# Patient Record
Sex: MALE | Race: ASIAN | Employment: UNEMPLOYED | URBAN - METROPOLITAN AREA
[De-identification: names, ages, dates, MRNs, and addresses within clinical notes are randomized per-mention and may not be internally consistent; named-entity substitution may affect disease eponyms.]

---

## 2018-12-09 ENCOUNTER — HOSPITAL ENCOUNTER (EMERGENCY)
Age: 58
Discharge: HOME | End: 2018-12-09
Attending: EMERGENCY MEDICINE
Payer: COMMERCIAL

## 2018-12-09 VITALS
DIASTOLIC BLOOD PRESSURE: 108 MMHG | HEIGHT: 63 IN | TEMPERATURE: 98.1 F | HEART RATE: 83 BPM | RESPIRATION RATE: 18 BRPM | SYSTOLIC BLOOD PRESSURE: 162 MMHG | WEIGHT: 171.96 LBS | OXYGEN SATURATION: 98 % | BODY MASS INDEX: 30.47 KG/M2

## 2018-12-09 DIAGNOSIS — N30.00 ACUTE CYSTITIS WITHOUT HEMATURIA: ICD-10-CM

## 2018-12-09 DIAGNOSIS — I86.1 RIGHT VARICOCELE: Primary | ICD-10-CM

## 2018-12-09 DIAGNOSIS — N50.89 SWELLING OF RIGHT TESTICLE: ICD-10-CM

## 2018-12-09 DIAGNOSIS — N50.89 TESTICULAR MICROLITHIASIS: ICD-10-CM

## 2018-12-09 DIAGNOSIS — I10 ESSENTIAL HYPERTENSION: ICD-10-CM

## 2018-12-09 DIAGNOSIS — N50.819 TESTICLE PAIN: ICD-10-CM

## 2018-12-09 LAB
AMPHETAMINE SCREEN, URINE: NORMAL
BACTERIA: ABNORMAL /HPF
BARBITURATE SCREEN URINE: NORMAL
BENZODIAZEPINE SCREEN, URINE: NORMAL
BILIRUBIN URINE: NEGATIVE
BLOOD, URINE: ABNORMAL
CANNABINOID SCREEN URINE: NORMAL
CLARITY: CLEAR
COCAINE METABOLITE SCREEN URINE: NORMAL
COLOR: YELLOW
EPITHELIAL CELLS, UA: 0 /HPF (ref 0–5)
GLUCOSE URINE: 100 MG/DL
HYALINE CASTS: 1 /LPF (ref 0–8)
KETONES, URINE: NEGATIVE MG/DL
LEUKOCYTE ESTERASE, URINE: ABNORMAL
Lab: NORMAL
METHADONE SCREEN, URINE: NORMAL
MICROSCOPIC EXAMINATION: YES
NITRITE, URINE: NEGATIVE
OPIATE SCREEN URINE: NORMAL
OXYCODONE URINE: NORMAL
PH UA: 6.5
PH UA: 6.5
PHENCYCLIDINE SCREEN URINE: NORMAL
PROPOXYPHENE SCREEN: NORMAL
PROTEIN UA: 100 MG/DL
RBC UA: 2 /HPF (ref 0–4)
SPECIFIC GRAVITY UA: 1.02
URINE REFLEX TO CULTURE: YES
URINE TYPE: ABNORMAL
UROBILINOGEN, URINE: 0.2 E.U./DL
WBC UA: 22 /HPF (ref 0–5)

## 2018-12-09 PROCEDURE — 87186 SC STD MICRODIL/AGAR DIL: CPT

## 2018-12-09 PROCEDURE — 99283 EMERGENCY DEPT VISIT LOW MDM: CPT

## 2018-12-09 PROCEDURE — 87086 URINE CULTURE/COLONY COUNT: CPT

## 2018-12-09 PROCEDURE — 80307 DRUG TEST PRSMV CHEM ANLYZR: CPT

## 2018-12-09 PROCEDURE — 6370000000 HC RX 637 (ALT 250 FOR IP): Performed by: EMERGENCY MEDICINE

## 2018-12-09 PROCEDURE — 87077 CULTURE AEROBIC IDENTIFY: CPT

## 2018-12-09 PROCEDURE — 6370000000 HC RX 637 (ALT 250 FOR IP)

## 2018-12-09 PROCEDURE — 81001 URINALYSIS AUTO W/SCOPE: CPT

## 2018-12-09 RX ORDER — CIPROFLOXACIN 500 MG/1
500 TABLET, FILM COATED ORAL ONCE
Status: COMPLETED | OUTPATIENT
Start: 2018-12-09 | End: 2018-12-09

## 2018-12-09 RX ORDER — TRAMADOL HYDROCHLORIDE 50 MG/1
50 TABLET ORAL EVERY 6 HOURS PRN
Qty: 12 TABLET | Refills: 0 | Status: SHIPPED | OUTPATIENT
Start: 2018-12-09 | End: 2018-12-12

## 2018-12-09 RX ORDER — HYDROCODONE BITARTRATE AND ACETAMINOPHEN 5; 325 MG/1; MG/1
TABLET ORAL
Status: COMPLETED
Start: 2018-12-09 | End: 2018-12-09

## 2018-12-09 RX ORDER — IBUPROFEN 600 MG/1
600 TABLET ORAL EVERY 6 HOURS PRN
Qty: 30 TABLET | Refills: 0 | Status: SHIPPED | OUTPATIENT
Start: 2018-12-09 | End: 2021-03-24

## 2018-12-09 RX ORDER — CIPROFLOXACIN 500 MG/1
500 TABLET, FILM COATED ORAL 2 TIMES DAILY
Qty: 14 TABLET | Refills: 0 | Status: SHIPPED | OUTPATIENT
Start: 2018-12-09 | End: 2018-12-16

## 2018-12-09 RX ADMIN — HYDROCODONE BITARTRATE AND ACETAMINOPHEN: 5; 325 TABLET ORAL at 02:45

## 2018-12-09 RX ADMIN — CIPROFLOXACIN 500 MG: 500 TABLET, FILM COATED ORAL at 06:15

## 2018-12-09 ASSESSMENT — PAIN SCALES - GENERAL: PAINLEVEL_OUTOF10: 9

## 2018-12-10 ENCOUNTER — HOSPITAL ENCOUNTER (OUTPATIENT)
Dept: ULTRASOUND IMAGING | Age: 58
Discharge: HOME | End: 2018-12-10
Payer: COMMERCIAL

## 2018-12-10 PROCEDURE — 76870 US EXAM SCROTUM: CPT

## 2018-12-11 LAB
ORGANISM: ABNORMAL
URINE CULTURE, ROUTINE: ABNORMAL
URINE CULTURE, ROUTINE: ABNORMAL

## 2020-02-21 ENCOUNTER — OFFICE VISIT (OUTPATIENT)
Dept: ORTHOPEDIC SURGERY | Age: 60
End: 2020-02-21
Payer: COMMERCIAL

## 2020-02-21 VITALS — HEIGHT: 64 IN | BODY MASS INDEX: 28.17 KG/M2 | WEIGHT: 165 LBS

## 2020-02-21 PROBLEM — M25.512 CHRONIC LEFT SHOULDER PAIN: Status: ACTIVE | Noted: 2020-02-21

## 2020-02-21 PROBLEM — E11.9 TYPE 2 DIABETES MELLITUS WITHOUT COMPLICATION, WITHOUT LONG-TERM CURRENT USE OF INSULIN (HCC): Status: ACTIVE | Noted: 2020-02-21

## 2020-02-21 PROBLEM — M65.331 TRIGGER FINGER, RIGHT MIDDLE FINGER: Status: ACTIVE | Noted: 2020-02-21

## 2020-02-21 PROBLEM — G89.29 CHRONIC LEFT SHOULDER PAIN: Status: ACTIVE | Noted: 2020-02-21

## 2020-02-21 PROCEDURE — 99202 OFFICE O/P NEW SF 15 MIN: CPT | Performed by: ORTHOPAEDIC SURGERY

## 2020-02-21 PROCEDURE — 20610 DRAIN/INJ JOINT/BURSA W/O US: CPT | Performed by: ORTHOPAEDIC SURGERY

## 2020-02-21 RX ORDER — LIDOCAINE HYDROCHLORIDE 10 MG/ML
5 INJECTION, SOLUTION INFILTRATION; PERINEURAL ONCE
Status: COMPLETED | OUTPATIENT
Start: 2020-02-21 | End: 2020-02-21

## 2020-02-21 RX ORDER — BETAMETHASONE SODIUM PHOSPHATE AND BETAMETHASONE ACETATE 3; 3 MG/ML; MG/ML
12 INJECTION, SUSPENSION INTRA-ARTICULAR; INTRALESIONAL; INTRAMUSCULAR; SOFT TISSUE ONCE
Status: COMPLETED | OUTPATIENT
Start: 2020-02-21 | End: 2020-02-21

## 2020-02-21 RX ADMIN — BETAMETHASONE SODIUM PHOSPHATE AND BETAMETHASONE ACETATE 12 MG: 3; 3 INJECTION, SUSPENSION INTRA-ARTICULAR; INTRALESIONAL; INTRAMUSCULAR; SOFT TISSUE at 10:42

## 2020-02-21 RX ADMIN — LIDOCAINE HYDROCHLORIDE 5 ML: 10 INJECTION, SOLUTION INFILTRATION; PERINEURAL at 10:43

## 2020-02-21 NOTE — PATIENT INSTRUCTIONS
1. ??????? ???? ??????????? ??????? ??????????? ????? ? ?????????? ???? ???? ???? ???????? ????? ???? ????? ??????????  2. ??????? ????????? ????? ?????? ???? ?????????? ? ??????? ??????? ???????? ??????? ????? ???? ???????????  3. 8 ???? 12 ????????????? ???? ????? ??????????  4. 2 ???? 4 ??????? ?????????????????    ?????? ????? (??????)   1. ??????? ?????? ??????? ??????? ????? ???? 30 ???????? ????? ????????? ??? ???????? ????? ??????????  2. ??????? ???? ????? ??????? ???????? ??????? ??????? ??? ?????? ????? ????????? ??????????? ??????? ????? ??? ??????? ????? ????? ??????? ???? ?????? ?????? ???????????  3. ???? ?????? ??????? 15 ???? 20 ?????????? ????? ??????????  4. ??????? ??????? ???? ?????? ???????? ???????????  5. ??????? 2 ???? 4 ??????? ????????????????? ???? ??? ???? ?????? ?????? ??????????    ?????? ????? (?????????)   1. ?????????? ??????????, ? ??????? ?????? ????? ???????? ???? ??? ??????????  2. ????? ??????? ?? ?????, ??????? ??? ?????? ????? ?????????? ??????? ?????? ??????? ???? ????????????? (????? ??????? ???? ????? ??????? ??????????????)  3. ????? ????????? ????? ???????? 15 ???? 30 ??????????? ???????????  4. ???????? ????? ????????? ?? ????? ???? ????????? ?????????????  5. ??????? 2 ???? 4 ??????? ????????????????? ???? ??? ???? ?????? ?????? ??????????    ?????? ???? ??????   1. ????? ?????? ??????? ?????? ????????? ? ?????? ??????? ??????? ??????????? ??????? ?????? ?????? ??????? ????????????  2. 6 ??????? ???????????  3. 8 ???? 12 ??? ?????????????????    ?????????? ???????: ?????? ??????   1. ??????? ???? ???? ??????????? ?? ??????? ???? ?? ??? ?? ??? ??????? ?????? ????? ???? ????? (?????? ????, ?????? ????)?  2. ?? ????????, ???? ?? ????????? ???? ??????????? ????? ????????? ???? ??????????? ??? ??? ??????? ?????? ??????? ????? ?????????  3. ????? ????????? ???? ????? ???? ????? ??????? ???? ???????????  4. 8 ???? 12 ??? ?????????????????    ?????????? ?????? ?????? ????????? ???? ?????? ???????????  5. 8 ???? 12 ??? ?????????????????  6. ??????? ???????? ????? ???????? ????????? ?? ?????????? ???? ?????, ??????? ????? ??????? ????????? ?????? ???? ??????????? ??????? ??????? ?????? ????????, ??????? ????? ?????? ? ???????? ????? ???????? ?????? ???? ???????????    ?????????? ???????: ??????????   1. ??? ??????? ?????? ??????? ???? ???? ??????? ??????????? (?????????? ????????? ????? ???????) ???????? ????? ????????? ?????? ??????? ?????  2. ?????????? ????? ?????? ??????? ????????? ????? ?????? 90 ?????? ???? ?????????????? ??????? ?????? ???????? ?? ????????? ?????????? ??????? ??????? ????????? ??? ????? ?????? ???????????  3. 8 ???? 12 ??? ?????????????????  4. ??????? ?????? ????? ?????? ????? ? ???, ?????? ??????? ??????? ????????? ?? ????????? ?????? ?????????? ??????? ????????? 90 ?????? ????? ??????????? ???????? ??????? ?????? ???? ???? ??????????? ??????? ?????? ???????? ??????? ???? ??????? ???? ??????? ????? ??????????? ??????? ??????? ????????? ??? ????? ?????? ???????????    ??????? ?????? ??????? ???????   1. ???? ???????? ????????? ????????? ????????? ?????? ???????? ?????? ???? ?????????? ??????? ??????? ???????? ?? ?????? ??????? ?????? ???? ???????????  2. ??????? ??????? ??????? ????? ? ??????? ????????? 90 ???????? ???????? ????????? ?? ?????????? ??????? ???????? ?????? ??????? ???? ?????? ???????? ????? ??????? ??????????? ????? ???? ??????? ?????? ? ??????? ?????? ????? ??????? ????? ????? ????????????? ???? ??????? ?????? ??????? ?????? ??????? ???? ????? ??????  3. ?????????? ???????? ????? ???????? ????????? ???? ??????? ????????????  4. ??????? ????????? ??????? ?????? ?????????? ??????? ???????????? ??????? ??????? ??????? ??????? ???? ????????? ???? ?????? ???????????  5. ??????? ?????? ? ???????? ?????? ?????????? ??????? ??????? ????? ?? ??????? ???? ?????? ???????????  6. 8 ???? 12 ??? ?????????????????    ?????? ?????? ??????? ??????? seconds, then relax. 4. Repeat 8 to 12 times. Straight-arm shoulder blade squeeze   1. Sit or stand tall. Relax your shoulders. 2. With palms down, hold your elastic tubing or band straight out in front of you. 3. Start with slight tension in the tubing or band, with your hands about shoulder-width apart. 4. Slowly pull straight out to the sides, squeezing your shoulder blades together. Keep your arms straight and at shoulder height. Slowly release. 5. Repeat 8 to 12 times. Rowing   1. Harrison your elastic tubing or band at about waist height. Take one end in each hand. 2. Sit or stand with your feet hip-width apart. 3. Hold your arms straight in front of you. Adjust your distance to create slight tension in the tubing or band. 4. Slightly tuck your chin. Relax your shoulders. 5. Without shrugging your shoulders, pull straight back. Your elbows will pass alongside your waist.    Pull-downs   1. Harrison your elastic tubing or band in the top of a closed door. Take one end in each hand. 2. Either sit or stand, depending on what is more comfortable. If you feel unsteady, sit on a chair. 3. Start with your arms up and comfortably apart, elbows straight. There should be a slight tension in the tubing or band. 4. Slightly tuck your chin, and look straight ahead. 5. Keeping your back straight, slowly pull down and back, bending your elbows. 6. Stop where your hands are level with your chin, in a \"goalpost\" position. 7. Repeat 8 to 12 times. Chest T stretch   1. Lie on your back. Raise your knees so they are bent. Plant your feet on the floor, hip-width apart. 2. Tuck your chin, and relax your shoulders. 3. Reach your arms straight out to the sides. If you don't feel a mild stretch in your shoulders and across your chest, use a foam roll or a tightly rolled blanket under your spine, from your tailbone to your head. 4. Relax in this position for at least 15 to 30 seconds while you breathe normally.
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
## 2020-03-11 ENCOUNTER — OFFICE VISIT (OUTPATIENT)
Dept: ORTHOPEDIC SURGERY | Age: 60
End: 2020-03-11
Payer: COMMERCIAL

## 2020-03-11 VITALS
HEIGHT: 64 IN | BODY MASS INDEX: 28.17 KG/M2 | HEART RATE: 78 BPM | RESPIRATION RATE: 16 BRPM | WEIGHT: 165 LBS | DIASTOLIC BLOOD PRESSURE: 71 MMHG | SYSTOLIC BLOOD PRESSURE: 116 MMHG

## 2020-03-11 PROCEDURE — 99203 OFFICE O/P NEW LOW 30 MIN: CPT | Performed by: PHYSICIAN ASSISTANT

## 2020-03-11 PROCEDURE — 20550 NJX 1 TENDON SHEATH/LIGAMENT: CPT | Performed by: PHYSICIAN ASSISTANT

## 2020-03-11 NOTE — PROGRESS NOTES
Mr. Mateo Apple is a 61 y.o. right handed individual  who is seen today in Hand Surgical Consultation at the request of Referring Not In System (Inactive). As Mr. Mateo Apple  does not speak english as his primary language, the entire visit today was conducted through a professional . Due to language barrier, an  was present during the history-taking and subsequent discussion (and for part of the physical exam) with this patient. He presents today regarding right symptoms which have been present for approximately 5 months. A history of antecedent trauma or injury is Absent. He reports symptoms to include mild pain and stiffness with frequent popping, catching or locking of the right Middle Finger. Finger symptoms are Daily worse in the morning or overnight. He reports mild pain located at the base of the symptomatic finger(s). Symptoms are worsening over time. Previous treatment has included no prior treatments used. He does not claim relation of his symptoms to his required work activities. He has not undergone any form of testing. The patient's , past medical history, medications, allergies,  family history, social history, and review of systems have been updated, reviewed and have been scanned into the chart today. Physical Exam:  Mr. Rodrigue Mesa's most recent vitals:  Vitals  BP: 116/71  Pulse: 78  Resp: 16  Height: 5' 4\" (162.6 cm)  Weight: 165 lb (74.8 kg)    He is well nourished, oriented to person, place & time. He demonstrates appropriate mood and affect as well as normal gait and station. Skin: Normal in appearance, Normal Color and Free of Lesions Bilaterally   Digital range of motion is limited by pain on the Right, normal on the Left. Active triggering is noted upon flexion in the right Middle Finger. There is not an associated flexion contracture of the PIP joint. No other digit demonstrates evidence for stenosing tenosynovitis bilaterally.   Wrist range of motion is Full and equal bilaterally bilaterally. Sensation is normal in the Whole Hand bilaterally  Vascular examination reveals normal, good capillary refill and good color bilaterally  Swelling is mild in the symptomatic digit, absent elsewhere bilaterally  There is no evidence of gross joint instability bilaterally. Muscular strength is clinically appropriate bilaterally. Examination for Stenosing Tenosynovitis demonstrates mild tenderness, thickening & nodularity at the A-1 pulley(s) of the Right Middle Finger. There is a palpable Nota's Node. There is Active triggering on active flexion with pain. No other digits demonstrate evidence of Stenosing Tenosynovitis. Examination of the first dorsal extensor compartments of the wrist demonstrates no thickening or fullness. There is no tenderness to palpation over the extensor tendons. Pain is not elicited with resisted thumb extension, and Finklestein's maneuver is negative bilaterally. Impression:  Mr. Casper Corrigan is showing clinical evidence of Stenosing Tenosynovitis (Trigger Finger) and presents requesting further treatment. Plan:    I have had a thorough discussion with Mr. Casper Corrigan regarding the treatment options available for his initially presenting Right Middle Finger stenosing tenosynovitis, which is causing him functional limitations. I have outlined for Mr. Jose Alejandro Mesa the benefits and consequences of the various treatment modalities, including a reasonable expectation for the long term success and the likelihood that further more aggressive treatment may be required for his current presenting condition. Based upon our current discussion and a reasonable understating of the options available to him, Mr. Casper Corrigan has selected to proceed with  an injection to the right Middle Finger Flexor Tendon Sheath.   I have outlined for him the nature of the injection, and the pre, sukhwinder and post injection considerations and the appropriate expectations for this injection. I have clearly explained to him that the above outlined treatment plan should not be expected to 'cure' his stenosing tenosynovitis, but we are rather treating the symptoms with which he presents. He has understood that in order to achieve long lasting relief of his symptoms and to prevent future worsening or further damage, that definitive surgical treatment would be required. Mr. Cyndie Rawls  voiced an appropriate understanding of our discussion, the options available to him, and of the expectations of his selected  treatment. He did wish to proceed with Right Middle Finger Flexor Tendon Sheath injection. Procedure:  right Middle Finger Trigger Finger Injection  [first Injection]: After full discussion of the nature of this process and outlining a treatment plan with Mr. Cyndie Rawls, we discussed the complications, limitations, expectations, alternatives, and risks of injection of the flexor tendon sheath. He understood this information well and verbally consented to this treatment. The skin of the symptomatic digit was prepped with Isopropyl Alcohol and under aseptic conditions the flexor tendon sheath was injected with a combination of 1/2 ml of 0.25% Bupivacaine without Epinephrine and 20 mg of Triamcinolone (40 mg/ml). Good filling of the flexor sheath was noted. A dry sterile bandage was applied and the patient tolerated the injection without difficulty. I advised the patient of the expected response, possible reactions and the instructions for care of the hand. I have also discussed with Mr. Cyndie Rawls the other treatment options available to him for this condition. We have today selected to proceed with treatment by injection with steroid medication.   He and I have agreed that if our current course of Injection treatment does not prove to be effective over the short term future, that he will schedule a follow-up appointment to discuss and select an alternate course of therapy including possibly further conservative treatment or surgical treatment. Mr. Cyndie Rawls has been given a full verbal list of instructions and precautions related to his present condition. I have asked him to followup with me in the office at the prescribed time. He is also specifically requested to call or return to the office sooner if his symptoms change or worsen prior to the next scheduled appointment.

## 2020-05-22 ENCOUNTER — OFFICE VISIT (OUTPATIENT)
Dept: PRIMARY CARE CLINIC | Age: 60
End: 2020-05-22
Payer: COMMERCIAL

## 2020-05-22 VITALS — HEART RATE: 75 BPM | OXYGEN SATURATION: 98 % | TEMPERATURE: 98.9 F

## 2020-05-22 PROCEDURE — 99213 OFFICE O/P EST LOW 20 MIN: CPT | Performed by: FAMILY MEDICINE

## 2020-05-22 NOTE — PATIENT INSTRUCTIONS
Advance Care Planning  People with COVID-19 may have no symptoms, mild symptoms, such as fever, cough, and shortness of breath or they may have more severe illness, developing severe and fatal pneumonia. As a result, Advance Care Planning with attention to naming a health care decision maker (someone you trust to make healthcare decisions for you if you could not speak for yourself) and sharing other health care preferences is important BEFORE a possible health crisis. Please contact your Primary Care Provider to discuss Advance Care Planning. Preventing the Spread of Coronavirus Disease 2019 in Homes and Residential Communities  For the most recent information go to [email or web address]    Prevention steps for People with confirmed or suspected COVID-19 (including persons under investigation) who do not need to be hospitalized  and   People with confirmed COVID-19 who were hospitalized and determined to be medically stable to go home    Your healthcare provider and public health staff will evaluate whether you can be cared for at home. If it is determined that you do not need to be hospitalized and can be isolated at home, you will be monitored by staff from your local or state health department. You should follow the prevention steps below until a healthcare provider or local or state health department says you can return to your normal activities. Stay home except to get medical care  People who are mildly ill with COVID-19 are able to isolate at home during their illness. You should restrict activities outside your home, except for getting medical care. Do not go to work, school, or public areas. Avoid using public transportation, ride-sharing, or taxis. Separate yourself from other people and animals in your home  People: As much as possible, you should stay in a specific room and away from other people in your home.  Also, you should use a separate before eating or preparing food. If soap and water are not readily available, use an alcohol-based hand  with at least 60% alcohol, covering all surfaces of your hands and rubbing them together until they feel dry. Soap and water are the best option if hands are visibly dirty. Avoid touching your eyes, nose, and mouth with unwashed hands. Avoid sharing personal household items  You should not share dishes, drinking glasses, cups, eating utensils, towels, or bedding with other people or pets in your home. After using these items, they should be washed thoroughly with soap and water. Clean all high-touch surfaces everyday  High touch surfaces include counters, tabletops, doorknobs, bathroom fixtures, toilets, phones, keyboards, tablets, and bedside tables. Also, clean any surfaces that may have blood, stool, or body fluids on them. Use a household cleaning spray or wipe, according to the label instructions. Labels contain instructions for safe and effective use of the cleaning product including precautions you should take when applying the product, such as wearing gloves and making sure you have good ventilation during use of the product. Monitor your symptoms  Seek prompt medical attention if your illness is worsening (e.g., difficulty breathing). Before seeking care, call your healthcare provider and tell them that you have, or are being evaluated for, COVID-19. Put on a facemask before you enter the facility. These steps will help the healthcare providers office to keep other people in the office or waiting room from getting infected or exposed. Ask your healthcare provider to call the local or state health department. Persons who are placed under active monitoring or facilitated self-monitoring should follow instructions provided by their local health department or occupational health professionals, as appropriate. When working with your local health department check their available hours.   If you have a medical emergency and need to call 911, notify the dispatch personnel that you have, or are being evaluated for COVID-19. If possible, put on a facemask before emergency medical services arrive. Discontinuing home isolation  Patients with confirmed COVID-19 should remain under home isolation precautions until the risk of secondary transmission to others is thought to be low. The decision to discontinue home isolation precautions should be made on a case-by-case basis, in consultation with healthcare providers and state and local health departments. Thank you for enrolling in [address] Please follow the instructions below to securely access your online medical record. [facility] allows you to send messages to your doctor, view your test results, renew your prescriptions, schedule appointments, and more. How Do I Sign Up? 1. In your Internet browser, go to [email or web address]  2. Click on the Sign Up Now link in the Sign In box. You will see the New Member Sign Up page. 3. Enter your [facility] Access Code exactly as it appears below. You will not need to use this code after youve completed the sign-up process. If you do not sign up before the expiration date, you must request a new code. [employer] Access Code: Activation code not generated  Current [facility] Status: Patient Declined    4. Enter your Social Security Number ([id number]) and Date of Birth (mm/dd/yyyy) as indicated and click Submit. You will be taken to the next sign-up page. 5. Create a [facility] ID. This will be your [facility] login ID and cannot be changed, so think of one that is secure and easy to remember. 6. Create a [facility] password. You can change your password at any time. 7. Enter your Password Reset Question and Answer. This can be used at a later time if you forget your password. 8. Enter your e-mail address. You will receive e-mail notification when new information is available in [address]   9. Click Sign Up. You can now view your medical record. Additional Information  If you have questions, please contact your physician practice where you receive care. Remember, [employer] is NOT to be used for urgent needs. For medical emergencies, dial 911.

## 2020-05-22 NOTE — PROGRESS NOTES
2020  Jose Alejandro Mesa (:  1960)    FLU/COVID-19 CLINIC EVALUATION  Chief Complaint   Patient presents with    Concern For COVID-19       HPI: works at [address] [place] [address] Avenue: SOB    Symptom duration, days:  [] 1   [] 2   [] 3   [x] 4 - 7   [] 8 - 10   [] 11 - 13   [] >14    [] Fevers    [] Symptom (not measured)  [] Measured (Result:  degrees)  [] Chills  [] Cough  [] Coughing up blood  }  [] Chest Congestion  [] Nasal Congestion  [] Sneezing  [x] Feeling short of breath  [] Sometimes  [] Frequently   [] All the time     [] Chest pain     [x] Headaches  []Tolerable  [] Severe     [] Sore throat  [] Muscle aches  [] Nausea  [] Vomiting  []Unable to keep fluids down     [] Diarrhea  []Severe     + HTN    [] OTHER SYMPTOMS:      Symptom course:   [] Worsening     [x] Stable     [] Improving    RISK FACTORS:1}  [] Pregnant or possibly pregnant  [] Age over 61  [] Diabetes  [] Heart disease  [] Asthma  + Smoker  [] COPD/Other chronic lung diseases  [] Active Cancer  [] On Chemotherapy  [] Taking oral steroids  [] History Lymphoma/Leukemia  [] Close contact with a lab confirmed COVID-19 patient within 14 days of symptom onset  [] History of travel from affected geographical areas within 14 days of symptom onset   + HTN    SOCIAL HISTORY:  Number of people living in patient's home (counting the patient as 1):  [] 1   [] 2   [] 3   [x] 4   [] 5   [] >6      PHYSICAL EXAMINATION:    Vitals:    20 1531   Pulse: 75   Temp: 98.9 °F (37.2 °C)   SpO2: 98%        INSTRUCTIONS:  \"[x]\" Indicates a positive item  \"[]\" Indicates a negative item    DELETE ALL ITEMS NOT EXAMINED    [x] Alert  [x] Oriented to person/place/time    [x] No apparent distress  [] Toxic appearing    [] Face flushed appearing [] Sclera clear  [] Lips are cyanotic      [x] Breathing appears normal  [] Appears tachypneic      [] Rash on visible skin    [] Cranial Nerves II-XII grossly intact    [] Motor grossly intact in visible upper extremities    []

## 2020-05-24 LAB
SARS-COV-2: NOT DETECTED
SOURCE: NORMAL

## 2020-08-04 ENCOUNTER — OFFICE VISIT (OUTPATIENT)
Dept: PRIMARY CARE CLINIC | Age: 60
End: 2020-08-04
Payer: OTHER GOVERNMENT

## 2020-08-04 PROCEDURE — 99211 OFF/OP EST MAY X REQ PHY/QHP: CPT | Performed by: NURSE PRACTITIONER

## 2020-08-04 NOTE — PATIENT INSTRUCTIONS

## 2020-08-04 NOTE — PROGRESS NOTES
Jose Alejandro Mesa received a viral test for COVID-19. They were educated on isolation and quarantine as appropriate. For any symptoms, they were directed to seek care from their PCP, given contact information to establish with a doctor, directed to an urgent care or the emergency room.

## 2020-08-06 LAB — SARS-COV-2, NAA: NOT DETECTED

## 2020-08-18 ENCOUNTER — OFFICE VISIT (OUTPATIENT)
Dept: PRIMARY CARE CLINIC | Age: 60
End: 2020-08-18
Payer: OTHER GOVERNMENT

## 2020-08-18 PROCEDURE — 99211 OFF/OP EST MAY X REQ PHY/QHP: CPT | Performed by: NURSE PRACTITIONER

## 2020-08-19 LAB — SARS-COV-2, NAA: NOT DETECTED

## 2020-08-20 ENCOUNTER — TELEPHONE (OUTPATIENT)
Dept: PRIMARY CARE CLINIC | Age: 60
End: 2020-08-20

## 2020-10-25 ENCOUNTER — HOSPITAL ENCOUNTER (EMERGENCY)
Age: 60
Discharge: HOME | End: 2020-10-25
Attending: EMERGENCY MEDICINE

## 2020-10-25 ENCOUNTER — APPOINTMENT (OUTPATIENT)
Dept: ULTRASOUND IMAGING | Age: 60
End: 2020-10-25

## 2020-10-25 VITALS
HEIGHT: 64 IN | RESPIRATION RATE: 15 BRPM | OXYGEN SATURATION: 98 % | DIASTOLIC BLOOD PRESSURE: 98 MMHG | WEIGHT: 165 LBS | HEART RATE: 85 BPM | SYSTOLIC BLOOD PRESSURE: 155 MMHG | BODY MASS INDEX: 28.17 KG/M2 | TEMPERATURE: 98.4 F

## 2020-10-25 LAB
ANION GAP SERPL CALCULATED.3IONS-SCNC: 11 MMOL/L (ref 3–16)
ANISOCYTOSIS: ABNORMAL
BACTERIA: ABNORMAL /HPF
BANDED NEUTROPHILS RELATIVE PERCENT: 2 % (ref 0–7)
BASOPHILS ABSOLUTE: 0.3 K/UL (ref 0–0.2)
BASOPHILS RELATIVE PERCENT: 2 %
BILIRUBIN URINE: NEGATIVE
BLOOD, URINE: ABNORMAL
BUN BLDV-MCNC: 24 MG/DL (ref 7–20)
CALCIUM SERPL-MCNC: 8.8 MG/DL (ref 8.3–10.6)
CHLORIDE BLD-SCNC: 103 MMOL/L (ref 99–110)
CLARITY: CLEAR
CO2: 22 MMOL/L (ref 21–32)
COLOR: YELLOW
CREAT SERPL-MCNC: 1.5 MG/DL (ref 0.8–1.3)
EOSINOPHILS ABSOLUTE: 0 K/UL (ref 0–0.6)
EOSINOPHILS RELATIVE PERCENT: 0 %
EPITHELIAL CELLS, UA: 1 /HPF (ref 0–5)
GFR AFRICAN AMERICAN: 58
GFR NON-AFRICAN AMERICAN: 48
GLUCOSE BLD-MCNC: 157 MG/DL (ref 70–99)
GLUCOSE URINE: 100 MG/DL
HCT VFR BLD CALC: 41.8 % (ref 40.5–52.5)
HEMOGLOBIN: 14 G/DL (ref 13.5–17.5)
HYALINE CASTS: 0 /LPF (ref 0–8)
KETONES, URINE: NEGATIVE MG/DL
LEUKOCYTE ESTERASE, URINE: ABNORMAL
LYMPHOCYTES ABSOLUTE: 2.2 K/UL (ref 1–5.1)
LYMPHOCYTES RELATIVE PERCENT: 17 %
MAGNESIUM: 2 MG/DL (ref 1.8–2.4)
MCH RBC QN AUTO: 29 PG (ref 26–34)
MCHC RBC AUTO-ENTMCNC: 33.4 G/DL (ref 31–36)
MCV RBC AUTO: 86.8 FL (ref 80–100)
MICROSCOPIC EXAMINATION: YES
MONOCYTES ABSOLUTE: 1.3 K/UL (ref 0–1.3)
MONOCYTES RELATIVE PERCENT: 10 %
NEUTROPHILS ABSOLUTE: 9.2 K/UL (ref 1.7–7.7)
NEUTROPHILS RELATIVE PERCENT: 69 %
NITRITE, URINE: NEGATIVE
PDW BLD-RTO: 13.9 % (ref 12.4–15.4)
PH UA: 5.5 (ref 5–8)
PLATELET # BLD: 248 K/UL (ref 135–450)
PLATELET SLIDE REVIEW: ADEQUATE
PMV BLD AUTO: 7.5 FL (ref 5–10.5)
POTASSIUM REFLEX MAGNESIUM: 3.3 MMOL/L (ref 3.5–5.1)
PROTEIN UA: 30 MG/DL
RBC # BLD: 4.81 M/UL (ref 4.2–5.9)
RBC UA: 2 /HPF (ref 0–4)
SODIUM BLD-SCNC: 136 MMOL/L (ref 136–145)
SPECIFIC GRAVITY UA: 1.02 (ref 1–1.03)
URINE TYPE: ABNORMAL
UROBILINOGEN, URINE: 0.2 E.U./DL
WBC # BLD: 13 K/UL (ref 4–11)
WBC UA: 14 /HPF (ref 0–5)

## 2020-10-25 PROCEDURE — 83735 ASSAY OF MAGNESIUM: CPT

## 2020-10-25 PROCEDURE — 99283 EMERGENCY DEPT VISIT LOW MDM: CPT

## 2020-10-25 PROCEDURE — 76870 US EXAM SCROTUM: CPT

## 2020-10-25 PROCEDURE — 81001 URINALYSIS AUTO W/SCOPE: CPT

## 2020-10-25 PROCEDURE — 80048 BASIC METABOLIC PNL TOTAL CA: CPT

## 2020-10-25 PROCEDURE — 85025 COMPLETE CBC W/AUTO DIFF WBC: CPT

## 2020-10-25 PROCEDURE — 6370000000 HC RX 637 (ALT 250 FOR IP): Performed by: EMERGENCY MEDICINE

## 2020-10-25 PROCEDURE — 36415 COLL VENOUS BLD VENIPUNCTURE: CPT

## 2020-10-25 RX ORDER — IBUPROFEN 600 MG/1
600 TABLET ORAL EVERY 8 HOURS PRN
Qty: 30 TABLET | Refills: 0 | OUTPATIENT
Start: 2020-10-25 | End: 2021-03-24

## 2020-10-25 RX ORDER — CIPROFLOXACIN 500 MG/1
500 TABLET, FILM COATED ORAL 2 TIMES DAILY
Qty: 20 TABLET | Refills: 0 | Status: SHIPPED | OUTPATIENT
Start: 2020-10-25 | End: 2020-11-04

## 2020-10-25 RX ORDER — IBUPROFEN 400 MG/1
400 TABLET ORAL ONCE
Status: COMPLETED | OUTPATIENT
Start: 2020-10-25 | End: 2020-10-25

## 2020-10-25 RX ORDER — HYDROCODONE BITARTRATE AND ACETAMINOPHEN 5; 325 MG/1; MG/1
1 TABLET ORAL ONCE
Status: COMPLETED | OUTPATIENT
Start: 2020-10-25 | End: 2020-10-25

## 2020-10-25 RX ADMIN — HYDROCODONE BITARTRATE AND ACETAMINOPHEN 1 TABLET: 5; 325 TABLET ORAL at 01:47

## 2020-10-25 RX ADMIN — IBUPROFEN 400 MG: 400 TABLET, FILM COATED ORAL at 01:47

## 2020-10-25 ASSESSMENT — PAIN SCALES - GENERAL
PAINLEVEL_OUTOF10: 8
PAINLEVEL_OUTOF10: 8

## 2020-10-25 NOTE — ED PROVIDER NOTES
eMERGENCY dEPARTMENT eNCOUnter      [facility]    Chief Complaint   Patient presents with    Groin Pain     Pt with right sided groin pain, and pain is shooting up ab and back. pt seen here in 2018 for varicocele and states that it feels just like that. HPI    Man Diane Edgar is a 61 y.o. male who presents with right-sided testicular pain has been going on for about for 5 days intermittently. No exacerbating or relieving factors no other associated signs or symptoms. We did the interview through a [facility] . His wife says he has a \"tumor\" on his testicle. Patient states that he has some swelling and has been getting worse. No exacerbating or relieving factor. He has a previous history of hydrocele    PAST MEDICAL HISTORY    Past Medical History:   Diagnosis Date    Diabetes mellitus ([facility] [address]     Hypertension        SURGICAL HISTORY    History reviewed. No pertinent surgical history. CURRENT MEDICATIONS    Current Outpatient Rx   Medication Sig Dispense Refill    ibuprofen (ADVIL;MOTRIN) 600 MG tablet Take 1 tablet by mouth every 6 hours as needed for Pain 30 tablet 0    metFORMIN (GLUCOPHAGE) 500 MG tablet Take 500 mg by mouth 2 times daily (with meals)      hydrochlorothiazide (HYDRODIURIL) 25 MG tablet Take 1 tablet by mouth daily for 30 doses 30 tablet 0    naproxen (NAPROSYN) 500 MG tablet Take 1 tablet by mouth 2 times daily (with meals) 30 tablet 0    hydrOXYzine (VISTARIL) 25 MG capsule Take 1 capsule by mouth 3 times daily as needed for Itching 20 capsule 0    hydrocortisone (ALA-RUPA) 1 % cream Apply topically 2 times daily. 1 Tube 1       ALLERGIES    No Known Allergies    FAMILY HISTORY    History reviewed. No pertinent family history.   Family history and social history reviewed and noncontributory  SOCIAL HISTORY    Social History     Socioeconomic History    Marital status:      Spouse name: None    Number of children: None    Years of education: None    Highest education level: None   Occupational History    None   Social Needs    Financial resource strain: None    Food insecurity     Worry: None     Inability: None    Transportation needs     Medical: None     Non-medical: None   Tobacco Use    Smoking status: Never Smoker    Smokeless tobacco: Never Used   Substance and Sexual Activity    Alcohol use: No    Drug use: No    Sexual activity: None   Lifestyle    Physical activity     Days per week: None     Minutes per session: None    Stress: None   Relationships    Social connections     Talks on phone: None     Gets together: None     Attends Che[employer] service: None     Active member of club or organization: None     Attends meetings of clubs or organizations: None     Relationship status: None    Intimate partner violence     Fear of current or ex partner: None     Emotionally abused: None     Physically abused: None     Forced sexual activity: None   Other Topics Concern    None   Social History Narrative    None       REVIEW OF SYSTEMS    Constitutional:  Denies fever, chills, weight loss or weakness   Eyes:  Denies photophobia or discharge   HENT:  Denies sore throat or ear pain   Respiratory:  Denies cough or shortness of breath   Cardiovascular:  Denies chest pain, palpitations or swelling   GI:  Denies abdominal pain, nausea, vomiting, or diarrhea, but has testicular pain  Musculoskeletal:  Denies back pain   Skin:  Denies rash   Neurologic:  Denies headache, focal weakness or sensory changes   Endocrine:  Denies polyuria or polydypsia   Lymphatic:  Denies swollen glands   Psychiatric:  Denies depression, suicidal ideation or homicidal ideation   All systems negative except as marked.      PHYSICAL EXAM    VITAL SIGNS: BP (!) 196/112   Pulse 98   Temp 98.4 °F (36.9 °C)   Resp 18   Ht 5' 4\" (1.626 m)   Wt 165 lb (74.8 kg)   SpO2 96%   BMI 28.32 kg/m²    Constitutional:  Well developed, Well nourished, No acute distress, Non-toxic appearance. HENT:  Normocephalic, Atraumatic, Bilateral external ears normal, Oropharynx moist, No oral exudates, Nose normal. Neck- Normal range of motion, No tenderness, Supple, No stridor. Eyes:  PERRL, EOMI, Conjunctiva normal, No discharge. Respiratory:  Normal breath sounds, No respiratory distress, No wheezing, No chest tenderness. Cardiovascular:  Normal heart rate, Normal rhythm, No murmurs, No rubs, No gallops. GI:  Bowel sounds normal, Soft, No tenderness, No masses, No pulsatile masses. : External genitalia appear normal, right testicle is firm and large with no obvious mass. It is still very tender no CVA tenderness. Musculoskeletal:  Intact distal pulses, No edema, No tenderness, No cyanosis, No clubbing. Good range of motion in all major joints. No tenderness to palpation or major deformities noted. Back- No tenderness. Integument:  Warm, Dry, No erythema, No rash. Lymphatic:  No lymphadenopathy noted. Neurologic:  Alert & oriented x 3, Normal motor function, Normal sensory function, No focal deficits noted. Psychiatric:  Affect normal, Judgment normal, Mood normal.     EKG        RADIOLOGY    US SCROTUM AND TESTICLES   Final Result   Findings compatible with right-sided epididymitis. No evidence for testicular torsion.            Labs Reviewed   CBC WITH AUTO DIFFERENTIAL - Abnormal; Notable for the following components:       Result Value    WBC 13.0 (*)     All other components within normal limits    Narrative:     Performed at:  [facility] [address], [place], [facility] [address]   Phone [phone]   BASIC METABOLIC PANEL W/ REFLEX TO MG FOR LOW K - Abnormal; Notable for the following components:    Potassium reflex Magnesium 3.3 (*)     Glucose 157 (*)     BUN 24 (*)     CREATININE 1.5 (*)     GFR Non- 48 (*)     GFR  58 (*)     All other components within normal limits    Narrative:     Performed at:  [facility] [address] [place], [place]   Phone [phone]   URINALYSIS - Abnormal; Notable for the following components:    Glucose, Ur 100 (*)     Blood, Urine TRACE (*)     Protein, UA 30 (*)     Leukocyte Esterase, Urine SMALL (*)     All other components within normal limits    Narrative:     Performed at:  [facility] [address] [place]  [place], [place]   Phone [phone]   MICROSCOPIC URINALYSIS - Abnormal; Notable for the following components:    Bacteria, UA 1+ (*)     WBC, UA 14 (*)     All other components within normal limits    Narrative:     Performed at:  [facility] [address]  [place], [place]   Phone [phone]   MAGNESIUM    Narrative:     Performed at:  [facility] [address] [place]  [place], [place]   Phone [phone]         PROCEDURES        ED Jarvis Dickson 630 studies reviewed. (See chart for details)  This patient has some testicular pain. I gave him medicine for pain here in the department and ordered ultrasound to rule out torsion. This patient was found to have epididymitis. There is no torsion and no mass. I will treat with antibiotic which should help with his symptoms. He will go home with some medicine for pain as well. Follow-up with primary care  FINAL IMPRESSION    1.  Epididymitis             Jayden Baig MD  10/25/20 3607

## 2021-01-07 ENCOUNTER — OFFICE VISIT (OUTPATIENT)
Dept: PRIMARY CARE CLINIC | Age: 61
End: 2021-01-07

## 2021-01-07 DIAGNOSIS — Z20.828 EXPOSURE TO SARS-ASSOCIATED CORONAVIRUS: Primary | ICD-10-CM

## 2021-01-07 PROCEDURE — 99211 OFF/OP EST MAY X REQ PHY/QHP: CPT | Performed by: NURSE PRACTITIONER

## 2021-01-07 NOTE — PATIENT INSTRUCTIONS

## 2021-01-07 NOTE — PROGRESS NOTES
[facility] received a viral test for COVID-19. They were educated on isolation and quarantine as appropriate. For any symptoms, they were directed to seek care from their PCP, given contact information to establish with a doctor, directed to an urgent care or the emergency room.

## 2021-01-08 LAB — SARS-COV-2, NAA: NOT DETECTED

## 2021-03-24 ENCOUNTER — HOSPITAL ENCOUNTER (EMERGENCY)
Age: 61
Discharge: HOME | End: 2021-03-24

## 2021-03-24 VITALS
HEART RATE: 79 BPM | HEIGHT: 64 IN | DIASTOLIC BLOOD PRESSURE: 110 MMHG | OXYGEN SATURATION: 97 % | BODY MASS INDEX: 28.17 KG/M2 | WEIGHT: 165 LBS | RESPIRATION RATE: 18 BRPM | TEMPERATURE: 98.4 F | SYSTOLIC BLOOD PRESSURE: 180 MMHG

## 2021-03-24 DIAGNOSIS — R04.0 EPISTAXIS: Primary | ICD-10-CM

## 2021-03-24 DIAGNOSIS — R03.0 ELEVATED BLOOD PRESSURE READING: ICD-10-CM

## 2021-03-24 PROCEDURE — 99282 EMERGENCY DEPT VISIT SF MDM: CPT

## 2021-03-24 RX ORDER — HYDROCHLOROTHIAZIDE 25 MG/1
25 TABLET ORAL DAILY
Qty: 30 TABLET | Refills: 0 | Status: ON HOLD | OUTPATIENT
Start: 2021-03-24 | End: 2022-09-20 | Stop reason: CLARIF

## 2021-03-24 RX ORDER — ECHINACEA PURPUREA EXTRACT 125 MG
1 TABLET ORAL PRN
Qty: 1 BOTTLE | Refills: 0 | Status: ON HOLD | OUTPATIENT
Start: 2021-03-24 | End: 2022-09-20 | Stop reason: CLARIF

## 2021-03-24 ASSESSMENT — ENCOUNTER SYMPTOMS
SHORTNESS OF BREATH: 0
RESPIRATORY NEGATIVE: 1
ABDOMINAL PAIN: 0
DIARRHEA: 0
SINUS PAIN: 0
SORE THROAT: 0
RHINORRHEA: 0
TROUBLE SWALLOWING: 0
COUGH: 0
VOICE CHANGE: 0
SINUS PRESSURE: 0
BACK PAIN: 0
COLOR CHANGE: 0
NAUSEA: 0
CHEST TIGHTNESS: 0
VOMITING: 0
CONSTIPATION: 0

## 2021-03-24 NOTE — ED NOTES
Using [facility]  [id number], pt reports intermittent nose bleed that started 2 days ago and would last 15-20-minutes. Pt denies taking anything for blood pressure or DM any longer though once prescribed.        Nolvia Gray RN  03/24/21 4275

## 2021-03-24 NOTE — ED PROVIDER NOTES
[facility]        Pt Name: Stephanie Melgar  MRN: [id number]  [place] 1960  Date of evaluation: 3/24/2021  Provider: JORGE Mooney  PCP: KANDACE Tyler CNP    LAURA. I have evaluated this patient. My supervising physician was available for consultation. CHIEF COMPLAINT       Chief Complaint   Patient presents with    Epistaxis     patient from home reports having nose bleed a couple days ago and had blood come out of mouth. bleeding lasted about 15-20min per patient. HISTORY OF PRESENT ILLNESS   (Location, Timing/Onset, Context/Setting, Quality, Duration, Modifying Factors, Severity, Associated Signs and Symptoms)  Note limiting factors. Stephanie Melgar is a 64 y.o. male with past medical history of diabetes and hypertension who presents to the ED with complaint of epistaxis. Patient states had nosebleed yesterday and another nosebleed today. States nosebleed last about 15 to 20 minutes and was able to subside with pressure. No bleeding upon arrival.  Became concerned and came to the ED for further evaluation and treatment. Patient denies any significant history of nosebleeds in the past.  States bleeding from the left nostril and also through his mouth. Patient denies any chest pain or shortness of breath. Denies any injury or trauma. Denies any foreign body insertion. Denies sinus pressure/pain, rhinorrhea, congestion, ear pain/drainage or difficulty breathing. Denies any headache. Denies any blood thinners. Nursing Notes were all reviewed and agreed with or any disagreements were addressed in the HPI. REVIEW OF SYSTEMS    (2-9 systems for level 4, 10 or more for level 5)     Review of Systems   Constitutional: Negative for activity change, appetite change, chills, diaphoresis, fatigue and fever. HENT: Positive for nosebleeds.  Negative for congestion, ear discharge, ear pain, postnasal drip, rhinorrhea, sinus pressure, sinus pain, sore throat, trouble swallowing and voice change. Respiratory: Negative. Negative for cough, chest tightness and shortness of breath. Cardiovascular: Negative. Negative for chest pain, palpitations and leg swelling. Gastrointestinal: Negative for abdominal pain, constipation, diarrhea, nausea and vomiting. Genitourinary: Negative for difficulty urinating and dysuria. Musculoskeletal: Negative for arthralgias, back pain, myalgias, neck pain and neck stiffness. Skin: Negative for color change, pallor, rash and wound. Neurological: Negative for dizziness, light-headedness and headaches. Positives and Pertinent negatives as per HPI. Except as noted above in the ROS, all other systems were reviewed and negative. PAST MEDICAL HISTORY     Past Medical History:   Diagnosis Date    Diabetes mellitus ([facility] [address]     Hypertension          SURGICAL HISTORY   No past surgical history on file. CURRENTMEDICATIONS       Previous Medications    METFORMIN (GLUCOPHAGE) 500 MG TABLET    Take 500 mg by mouth 2 times daily (with meals)         ALLERGIES     Patient has no known allergies. FAMILYHISTORY     No family history on file. SOCIAL HISTORY       Social History     Tobacco Use    Smoking status: Never Smoker    Smokeless tobacco: Never Used   Substance Use Topics    Alcohol use: No    Drug use: No       SCREENINGS             PHYSICAL EXAM    (up to 7 for level 4, 8 or more for level 5)     ED Triage Vitals [03/24/21 1317]   BP Temp Temp src Pulse Resp SpO2 Height Weight   (!) 180/110 98.4 °F (36.9 °C) -- 79 18 97 % 5' 4\" (1.626 m) 165 lb (74.8 kg)       Physical Exam  Constitutional:       General: He is not in acute distress. Appearance: Normal appearance. He is well-developed. He is not ill-appearing, toxic-appearing or diaphoretic. HENT:      Head: Normocephalic and atraumatic.       Right Ear: External ear normal.      Left Ear: External ear normal.      Nose: Nose normal. No congestion or rhinorrhea. Comments: Dried blood noted to the left nostril. No active bleeding or source of bleeding identified at this time. No septal hematoma or septal perforation. No posterior bleeding noted. No mucosal edema. No TTP to the frontal or maxillary sinuses. Mouth/Throat:      Mouth: Mucous membranes are moist.      Pharynx: No oropharyngeal exudate or posterior oropharyngeal erythema. Eyes:      General:         Right eye: No discharge. Left eye: No discharge. Conjunctiva/sclera: Conjunctivae normal.   Neck:      Musculoskeletal: Normal range of motion and neck supple. No neck rigidity or muscular tenderness. Cardiovascular:      Rate and Rhythm: Normal rate and regular rhythm. Pulses: Normal pulses. Heart sounds: Normal heart sounds. No murmur. No friction rub. No gallop. Pulmonary:      Effort: Pulmonary effort is normal. No respiratory distress. Breath sounds: Normal breath sounds. No stridor. No wheezing, rhonchi or rales. Chest:      Chest wall: No tenderness. Musculoskeletal: Normal range of motion. Lymphadenopathy:      Cervical: No cervical adenopathy. Skin:     General: Skin is warm and dry. Coloration: Skin is not pale. Findings: No erythema or rash. Neurological:      Mental Status: He is alert and oriented to person, place, and time. Psychiatric:         Behavior: Behavior normal.         DIAGNOSTIC RESULTS   LABS:    Labs Reviewed - No data to display    All other labs were within normal range or not returned as of this dictation. EKG: All EKG's are interpreted by the Emergency Department Physician in the absence of a cardiologist.  Please see their note for interpretation of EKG.       RADIOLOGY:   Non-plain film images such as CT, Ultrasound and MRI are read by the radiologist. Plain radiographic images are visualized and preliminarily interpreted by the ED Provider further work-up or imaging indicated at this time. FINAL IMPRESSION      1. Epistaxis    2. Elevated blood pressure reading          DISPOSITION/PLAN   DISPOSITION Decision To Discharge 03/24/2021 01:47:19 PM      PATIENT REFERREDTO:  Shala DhaliwalKANDACE - CNP  [address] [address]  [phone]    Schedule an appointment as soon as possible for a visit   For a Re-check in   3-5   days. [facility] Emergency Department  [address] [place] [address]  [phone]  Go to   As needed, If symptoms worsen      DISCHARGE MEDICATIONS:  New Prescriptions    SODIUM CHLORIDE (OCEAN) 0.65 % NASAL SPRAY    1 spray by Nasal route as needed for Congestion       DISCONTINUED MEDICATIONS:  Discontinued Medications    HYDROCORTISONE (ALA-RUPA) 1 % CREAM    Apply topically 2 times daily.     HYDROXYZINE (VISTARIL) 25 MG CAPSULE    Take 1 capsule by mouth 3 times daily as needed for Itching    IBUPROFEN (ADVIL;MOTRIN) 600 MG TABLET    Take 1 tablet by mouth every 6 hours as needed for Pain    IBUPROFEN (ADVIL;MOTRIN) 600 MG TABLET    Take 1 tablet by mouth every 8 hours as needed for Pain    NAPROXEN (NAPROSYN) 500 MG TABLET    Take 1 tablet by mouth 2 times daily (with meals)              (Please note that portions of this note were completed with a voice recognition program.  Efforts were made to edit the dictations but occasionally words are mis-transcribed.)    JORGE Mckeon (electronically signed)          JORGE Porter  03/24/21 4743

## 2021-03-24 NOTE — ED NOTES
Reviewed discharge instructions with patient via [facility]  #[id number]. No questions at this time. No sign of distress. AOx3. Pt ambulated to ER exit with steady gait.         Shanice Cee RN  03/24/21 9770

## 2022-02-24 ENCOUNTER — OFFICE VISIT (OUTPATIENT)
Dept: ORTHOPEDIC SURGERY | Age: 62
End: 2022-02-24
Payer: COMMERCIAL

## 2022-02-24 VITALS — HEIGHT: 64 IN | BODY MASS INDEX: 23.8 KG/M2 | WEIGHT: 139.4 LBS

## 2022-02-24 DIAGNOSIS — S60.511A ABRASION OF RIGHT HAND, INITIAL ENCOUNTER: ICD-10-CM

## 2022-02-24 DIAGNOSIS — M65.331 TRIGGER MIDDLE FINGER OF RIGHT HAND: Primary | ICD-10-CM

## 2022-02-24 DIAGNOSIS — M75.82 ROTATOR CUFF TENDINITIS, LEFT: ICD-10-CM

## 2022-02-24 PROCEDURE — 4004F PT TOBACCO SCREEN RCVD TLK: CPT | Performed by: ORTHOPAEDIC SURGERY

## 2022-02-24 PROCEDURE — 3017F COLORECTAL CA SCREEN DOC REV: CPT | Performed by: ORTHOPAEDIC SURGERY

## 2022-02-24 PROCEDURE — G8420 CALC BMI NORM PARAMETERS: HCPCS | Performed by: ORTHOPAEDIC SURGERY

## 2022-02-24 PROCEDURE — 99203 OFFICE O/P NEW LOW 30 MIN: CPT | Performed by: ORTHOPAEDIC SURGERY

## 2022-02-24 PROCEDURE — G8427 DOCREV CUR MEDS BY ELIG CLIN: HCPCS | Performed by: ORTHOPAEDIC SURGERY

## 2022-02-24 PROCEDURE — G8484 FLU IMMUNIZE NO ADMIN: HCPCS | Performed by: ORTHOPAEDIC SURGERY

## 2022-02-24 NOTE — PROGRESS NOTES
CHIEF COMPLAINT:   1- Right long (middle) finger pain/ trigger finger. 2- Left shoulder pain/ cuff tendinopathy/ impingement syndrome. HISTORY:  Mr. Tyron Tinoco is 58 y.o. Syriac male right handed presents today for evaluation of a right long (middle) finger pain which started Dec 2021 and is worsening over time. Denies trauma or injury. The patient is complaining of triggering and pain right long (middle) finger with no numbness or tingling. This is worse with ROM, rest makes pain better. No other complaint. He is also here for evaluation of left shoulder pain which started Feb 2022. He is complaining of sharp pain. Pain is increase with elevation and decrease with rest. No radiation and no numbness and tingling sensation. No other complaint. Past Medical History:   Diagnosis Date    Diabetes mellitus ([facility] [address]     Hypertension        History reviewed. No pertinent surgical history. Social History     Socioeconomic History    Marital status:      Spouse name: Not on file    Number of children: Not on file    Years of education: Not on file    Highest education level: Not on file   Occupational History    Not on file   Tobacco Use    Smoking status: Current Every Day Smoker    Smokeless tobacco: Never Used   Vaping Use    Vaping Use: Never used   Substance and Sexual Activity    Alcohol use: No    Drug use: No    Sexual activity: Not on file   Other Topics Concern    Not on file   Social History Narrative    Not on file     Social Determinants of Health     Financial Resource Strain:     Difficulty of Paying Living Expenses: Not on file   Food Insecurity:     Worried About [address] [employer] in the Last Year: Not on file    Ivet of Food in the Last Year: Not on file   Transportation Needs:     Lack of Transportation (Medical): Not on file    Lack of Transportation (Non-Medical):  Not on file   Physical Activity:     Days of Exercise per Week: Not on file    Minutes of Exercise per Session: Not on file   Stress:     Feeling of Stress : Not on file   Social Connections:     Frequency of Communication with Friends and Family: Not on file    Frequency of Social Gatherings with Friends and Family: Not on file    Attends Nondenominational Services: Not on file    Active Member of Clubs or Organizations: Not on file    Attends Club or Organization Meetings: Not on file    Marital Status: Not on file   Intimate Partner Violence:     Fear of Current or Ex-Partner: Not on file    Emotionally Abused: Not on file    Physically Abused: Not on file    Sexually Abused: Not on file   Housing Stability:     Unable to Pay for Housing in the Last Year: Not on file    Number of [place] in the Last Year: Not on file    Unstable Housing in the Last Year: Not on file       History reviewed. No pertinent family history. Current Outpatient Medications on File Prior to Visit   Medication Sig Dispense Refill    hydroCHLOROthiazide (HYDRODIURIL) 25 MG tablet Take 1 tablet by mouth daily for 30 doses 30 tablet 0    sodium chloride (OCEAN) 0.65 % nasal spray 1 spray by Nasal route as needed for Congestion 1 Bottle 0    [DISCONTINUED] ibuprofen (ADVIL;MOTRIN) 600 MG tablet Take 1 tablet by mouth every 8 hours as needed for Pain 30 tablet 0    metFORMIN (GLUCOPHAGE) 500 MG tablet Take 500 mg by mouth 2 times daily (with meals)      [DISCONTINUED] naproxen (NAPROSYN) 500 MG tablet Take 1 tablet by mouth 2 times daily (with meals) 30 tablet 0     No current facility-administered medications on file prior to visit. Pertinent items are noted in HPI  Review of systems reviewed from Patient History Form and available in the patient's chart under the Media tab. No change noted. PHYSICAL EXAMINATION:  Mr. Brennan Humphreys is a very pleasant 58 y.o. Yakut male who presents today in no acute distress, awake, alert, and oriented. He is well dressed, nourished and  groomed. Patient with normal affect.   Height is  5' 4\" (1.626 m), weight is 139 lb 6.4 oz (63.2 kg), Body mass index is 23.93 kg/m². Resting respiratory rate is 16. Examination of the gait, showed that the patient walks with no limp . Examination of both upper extremities showing a decrease range of motion with triggering of the right long (middle) finger compare to the other side. There is mild swelling that can be seen with tenderness over A1 pulley. Examination of the gait, showed that the patient walks heel-toe with a non-antalgic gait and no limp. On evaluation of the left shoulder, range of motion is 90 degree in flexion and 90 degree in abduction. There is positve impingement signs. Motor and sensation is intact and symmetric throughout the bilateral upper extremities in the median, ulnar and radial nerve distributions. He has 2+ radial pulses bilaterally. IMAGING: Laurelnai Malagon were reviewed, taken today in the office, 3 views of the right hand, and showed no fracture. X-rays were taken in the office today, 3 views of the left shoulder, and showed no acute fracture. Mild cuff arthropathy. IMPRESSION:  1- Right long (middle) finger pain/ trigger finger. 2- Left shoulder pain/ cuff tendinopathy/ impingement syndrome. PLAN:  I believe he may benefit from cortisone injection left shoulder, if not better. I assured the patient that the xray is negative for acute fracture. The patient can take NSAIDs PRN. Since he is continuing to have significant symptoms, I believe he will benefit from surgical release of the trigger finger. I discussed the risks and benefits of surgery with the patient, including but not limited to infection, bleeding, pain, injury to nerves or blood vessels failure of the surgery and need for additional surgery. All the patient's questions were answered. We discussed an expected post-operative course. He  is understanding of this and wishes to proceed.          Sergio Beach MD

## 2022-02-24 NOTE — LETTER
[facility]  [address]  [address] [address]  Phone: [phone]  Fax: [phone]    Emigdio Rivas MD        February 24, 2022     Patient: Tameka Bernal   YOB: 1960   Date of Visit: 2/24/2022       To Whom It May Concern: It is my medical opinion that Ventura Breaux should remain out of work until 3/18/22    If you have any questions or concerns, please don't hesitate to call.     Sincerely,        Emigdio Rivas MD

## 2022-02-24 NOTE — LETTER
[address] [facility]  Surgery Scheduling Form:      DEMOGRAPHICS:                                                                                                              .    Patient Name:  Delores Coreas  Patient :  1960   Patient SS#:      Patient Phone:  [phone] (home)  Alt. Patient Phone:  [phone]  Patient Address:  [address] [place] [address]    PCP:  KANDACE Anne CNP    Insurance ID Number:  Payor/Plan Subscr  Sex Relation Sub. Ins. ID Effective Group Num   1. LUPE MARKETFayrene Snooks 1960 Male Self [id number]                                   [address] [address]   2. [address] [facility] 1960 Male Self [id number]                                    PO [id number] [facility] GROUP, ATTN:CARES ACT PROVIDER RELIEF FUND        DIAGNOSIS & PROCEDURE:                                                                                            .    Diagnosis:   Right trigger finger M65.30  Operation:  Right long finger trigger finger release [id number]  Location: [address] [place] [address]  Provider:  [facility] Diamond MD      SCHEDULING INFORMATION:                                                                                         .    Requested Date:   22   OR Time:  7:15                      Patient Arrival Time:  5:45  OR Time Required:  15  Minutes  Anesthesia:  General  Equipment:  none  Mini C-Arm:  No   Standard C-Arm:  No  Status:  Outpatient  PAT Required:  Yes    Comments:COVID rapid  [facility] interpretor needed          Monae Diamond MD     22               Pre Operative Physician Prophylaxis Orders - SCIP Protocols      Patient: Delores Coreas  :    1960    Procedure: 3-3-22 Right long finger trigger finger release [id number]    COVID RAPID  HEIGHT:  Ht Readings from Last 1 Encounters:   22 5' 4\" (1.626 m)                      WEIGHT:  Wt Readings from Last 1 Encounters:   22 139 lb 6.4 oz (63.2 kg)         History & Physical:  [ ] By Surgeon  [ ]By PCP  [ Dawn Anderson Report     Pre-Operative Antibiotic Order:     []  ----  No Antibiotic Ordered       [x]  ----  Give the following Antibiotic within 1 hour prior to start time:                                                      Cefazolin 2g IV <119.9kg (264lbs) OR 3g if >120kg (151GJJ)       or      Clindamycin 900 mg IV (over 1 hour) if patient is allergic to           PENICILLINS or CAPHALOSPORIN       VTE prophylaxis [ ] Thigh hi  TEDS  [ ]  Knee Hi TEDS [ ] Foot Pumps [ ] Compression Devices      Physician Signature:     Date: 2/24/22  Time: 2:22 PM

## 2022-02-24 NOTE — LETTER
[facility]  [address]  [address] [address]  Phone: [phone]  Fax: [phone]    Dong Clarke MD        February 24, 2022     Patient: Faye Gray   YOB: 1960   Date of Visit: 2/24/2022       To Whom It May Concern: It is my medical opinion that Man Moshe please excuse Man for work missed over the past week due to his finger condition. .    If you have any questions or concerns, please don't hesitate to call.     Sincerely,        Dong Clarke MD

## 2022-02-25 ENCOUNTER — TELEPHONE (OUTPATIENT)
Dept: ORTHOPEDIC SURGERY | Age: 62
End: 2022-02-25

## 2022-02-25 NOTE — PROGRESS NOTES
Earl  scheduled with [employer] #[id number], to arrive @ [facility] for 3.5hours.

## 2022-02-28 NOTE — PROGRESS NOTES
Name_______________________________________Printed:____________________  Date and time of surgery___03/03/22_____0715________________Arrival Time:__0600___[facility]___________   1. The instructions given regarding when and if a patient needs to stop oral intake prior to surgery varies. Follow the specific instructions you were given                  _X__Nothing to eat or to drink after Midnight the night before.                   ____Carbo loading or ERAS instructions will be given to select patients-if you have been given those instructions -please do the following                           The evening before your surgery after dinner before midnight drink 40 ounces of gatorade. If you are diabetic use sugar free. The morning of surgery drink 40 ounces of water. This needs to be finished 3 hours prior to your surgery start time. 2. Take the following pills with a small sip of water on the morning of surgery_______NA____________________________________________                  Do not take blood pressure medications ending in pril or sartan the racquel prior to surgery or the morning of surgery_   3. Aspirin, Ibuprofen, Advil, Naproxen, Vitamin E and other Anti-inflammatory products and supplements should be stopped for 5 -7days before surgery or as directed by your physician. 4. Check with your Doctor regarding stopping Plavix, Coumadin,Eliquis, Lovenox,Effient,Pradaxa,Xarelto, Fragmin or other blood thinners and follow their instructions. 5. Do not smoke, and do not drink any alcoholic beverages 24 hours prior to surgery. This includes NA Beer. Refrain from the usage of any recreational drugs. 6. You may brush your teeth and gargle the morning of surgery. DO NOT SWALLOW WATER   7. You MUST make arrangements for a responsible adult to stay on site while you are here and take you home after your surgery. You will not be allowed to leave alone or drive yourself home.   It is strongly suggested someone stay with you the first 24 hrs. Your surgery will be cancelled if you do not have a ride home. 8. A parent/legal guardian must accompany a child scheduled for surgery and plan to stay at the hospital until the child is discharged. Please do not bring other children with you. 9. Please wear simple, loose fitting clothing to the hospital.  Cornell Rodgers not bring valuables (money, credit cards, checkbooks, etc.) Do not wear any makeup (including no eye makeup) or nail polish on your fingers or toes. 10. DO NOT wear any jewelry or piercings on day of surgery. All body piercing jewelry must be removed. 11. If you have ___dentures, they will be removed before going to the OR; we will provide you a container. If you wear ___contact lenses or ___glasses, they will be removed; please bring a case for them. 12. Please see your family doctor/pediatrician for a history & physical and/or concerning medications. Bring any test results/reports from your physician's office. PCP__________________Phone___________H&P Appt. Date________             13 If you  have a Living Will and Durable Power of  for Healthcare, please bring in a copy. 15. Notify your Surgeon if you develop any illness between now and surgery  time, cough, cold, fever, sore throat, nausea, vomiting, etc.  Please notify your surgeon if you experience dizziness, shortness of breath or blurred vision between now & the time of your surgery             15. DO NOT shave your operative site 96 hours prior to surgery. For face & neck surgery, men may use an electric razor 48 hours prior to surgery. 16. Shower the night before or morning of surgery using an antibacterial soap or as you have been instructed. 17. To provide excellent care visitors will be limited to one in the room at any given time. 18.  Please bring picture ID and insurance card.              19.  Visit our web site for additional information:  [facility] [email or web address]              20.During flu season no children under the age of 15 are permitted in the hospital for the safety of all patients. 21. If you take a long acting insulin in the evening only  take half of your usual  dose the night  before your procedure              22. If you use a c-pap please bring DOS if staying overnight,             23.For your convenience [facility] has a pharmacy on site to fill your prescriptions. 24. If you use oxygen and have a portable tank please bring it  with you the DOS             25. Bring a complete list of all your medications with name and dose include any supplements. 26. Other__________________________________________   *Please call pre admission testing if you any further questions   [facility]    [place] [phone] [place]  [phone]   [facility]    ___ Covid test to be done 3-5 days prior to scheduled surgery -patient aware they are REQUIRED to bring a copy of the negative result DOS-if they receive a positive result to notify their surgeon         If known - indicate where patient is getting covid test done ___________________________________________________________    _X__ Rapid - DOS    ___ Other___________________________________      [facility] POLICY(subject to change)    There is a one visitor policy at [facility] for all surgeries and endoscopies. Whether the visitor can stay or will be asked to wait in the car will depend on the current policy and if social distancing can be maintained. The policy is subject to change at any time. Please make sure the visitor has a cell phone that is on,charged and able to accept calls, as this may be the way that the staff communicates with them. Pain management is NO VISITOR policyThe patients ride is expected to remain in the car with a cell phone for communication. If the ride is leaving the hospital grounds please make sure they are back in time for pickup. Have the patient inform the staff on arrival what their rides plans are while the patient is in the facility. At the MAIN there is one visitor allowed. Please note that the visitor policy is subject to change. All above information reviewed with patient in person or by phone. Patient verbalizes understanding. All questions and concerns addressed.                                                                                                  Patient/Rep___Patient____Spoke thru  #[id number] [facility] languages _____________                                                                                                                                    PRE OP INSTRUCTIONS

## 2022-03-02 ENCOUNTER — ANESTHESIA EVENT (OUTPATIENT)
Dept: OPERATING ROOM | Age: 62
End: 2022-03-02
Payer: COMMERCIAL

## 2022-03-03 ENCOUNTER — HOSPITAL ENCOUNTER (OUTPATIENT)
Age: 62
Setting detail: OUTPATIENT SURGERY
Discharge: HOME | End: 2022-03-03
Attending: ORTHOPAEDIC SURGERY | Admitting: ORTHOPAEDIC SURGERY
Payer: COMMERCIAL

## 2022-03-03 ENCOUNTER — ANESTHESIA (OUTPATIENT)
Dept: OPERATING ROOM | Age: 62
End: 2022-03-03
Payer: COMMERCIAL

## 2022-03-03 VITALS
WEIGHT: 139 LBS | DIASTOLIC BLOOD PRESSURE: 84 MMHG | RESPIRATION RATE: 17 BRPM | OXYGEN SATURATION: 97 % | TEMPERATURE: 97.8 F | BODY MASS INDEX: 23.73 KG/M2 | HEIGHT: 64 IN | HEART RATE: 73 BPM | SYSTOLIC BLOOD PRESSURE: 129 MMHG

## 2022-03-03 VITALS
DIASTOLIC BLOOD PRESSURE: 52 MMHG | SYSTOLIC BLOOD PRESSURE: 75 MMHG | OXYGEN SATURATION: 97 % | RESPIRATION RATE: 1 BRPM

## 2022-03-03 DIAGNOSIS — Z01.818 PREOP TESTING: ICD-10-CM

## 2022-03-03 DIAGNOSIS — M65.331 TRIGGER MIDDLE FINGER OF RIGHT HAND: Primary | ICD-10-CM

## 2022-03-03 LAB
GLUCOSE BLD-MCNC: 114 MG/DL (ref 70–99)
PERFORMED ON: ABNORMAL
SARS-COV-2, NAAT: NOT DETECTED

## 2022-03-03 PROCEDURE — 2580000003 HC RX 258: Performed by: ANESTHESIOLOGY

## 2022-03-03 PROCEDURE — 26055 INCISE FINGER TENDON SHEATH: CPT | Performed by: ORTHOPAEDIC SURGERY

## 2022-03-03 PROCEDURE — 7100000011 HC PHASE II RECOVERY - ADDTL 15 MIN: Performed by: ORTHOPAEDIC SURGERY

## 2022-03-03 PROCEDURE — 3600000013 HC SURGERY LEVEL 3 ADDTL 15MIN: Performed by: ORTHOPAEDIC SURGERY

## 2022-03-03 PROCEDURE — 3700000001 HC ADD 15 MINUTES (ANESTHESIA): Performed by: ORTHOPAEDIC SURGERY

## 2022-03-03 PROCEDURE — 2500000003 HC RX 250 WO HCPCS: Performed by: NURSE ANESTHETIST, CERTIFIED REGISTERED

## 2022-03-03 PROCEDURE — 3700000000 HC ANESTHESIA ATTENDED CARE: Performed by: ORTHOPAEDIC SURGERY

## 2022-03-03 PROCEDURE — 3600000003 HC SURGERY LEVEL 3 BASE: Performed by: ORTHOPAEDIC SURGERY

## 2022-03-03 PROCEDURE — 7100000000 HC PACU RECOVERY - FIRST 15 MIN: Performed by: ORTHOPAEDIC SURGERY

## 2022-03-03 PROCEDURE — 6360000002 HC RX W HCPCS: Performed by: ORTHOPAEDIC SURGERY

## 2022-03-03 PROCEDURE — 2500000003 HC RX 250 WO HCPCS: Performed by: ORTHOPAEDIC SURGERY

## 2022-03-03 PROCEDURE — 7100000001 HC PACU RECOVERY - ADDTL 15 MIN: Performed by: ORTHOPAEDIC SURGERY

## 2022-03-03 PROCEDURE — 2709999900 HC NON-CHARGEABLE SUPPLY: Performed by: ORTHOPAEDIC SURGERY

## 2022-03-03 PROCEDURE — 7100000010 HC PHASE II RECOVERY - FIRST 15 MIN: Performed by: ORTHOPAEDIC SURGERY

## 2022-03-03 PROCEDURE — 87635 SARS-COV-2 COVID-19 AMP PRB: CPT

## 2022-03-03 PROCEDURE — 6360000002 HC RX W HCPCS: Performed by: NURSE ANESTHETIST, CERTIFIED REGISTERED

## 2022-03-03 RX ORDER — BUPIVACAINE HYDROCHLORIDE 5 MG/ML
INJECTION, SOLUTION EPIDURAL; INTRACAUDAL
Status: COMPLETED | OUTPATIENT
Start: 2022-03-03 | End: 2022-03-03

## 2022-03-03 RX ORDER — KETOROLAC TROMETHAMINE 30 MG/ML
INJECTION, SOLUTION INTRAMUSCULAR; INTRAVENOUS PRN
Status: DISCONTINUED | OUTPATIENT
Start: 2022-03-03 | End: 2022-03-03 | Stop reason: SDUPTHER

## 2022-03-03 RX ORDER — SODIUM CHLORIDE 9 MG/ML
INJECTION, SOLUTION INTRAVENOUS CONTINUOUS
Status: DISCONTINUED | OUTPATIENT
Start: 2022-03-03 | End: 2022-03-03 | Stop reason: HOSPADM

## 2022-03-03 RX ORDER — TRAMADOL HYDROCHLORIDE 50 MG/1
50 TABLET ORAL EVERY 6 HOURS PRN
Qty: 12 TABLET | Refills: 0 | Status: SHIPPED | OUTPATIENT
Start: 2022-03-03 | End: 2022-03-06

## 2022-03-03 RX ORDER — DEXAMETHASONE SODIUM PHOSPHATE 4 MG/ML
INJECTION, SOLUTION INTRA-ARTICULAR; INTRALESIONAL; INTRAMUSCULAR; INTRAVENOUS; SOFT TISSUE PRN
Status: DISCONTINUED | OUTPATIENT
Start: 2022-03-03 | End: 2022-03-03 | Stop reason: SDUPTHER

## 2022-03-03 RX ORDER — ONDANSETRON 2 MG/ML
INJECTION INTRAMUSCULAR; INTRAVENOUS PRN
Status: DISCONTINUED | OUTPATIENT
Start: 2022-03-03 | End: 2022-03-03 | Stop reason: SDUPTHER

## 2022-03-03 RX ORDER — CEPHALEXIN 500 MG/1
500 CAPSULE ORAL 4 TIMES DAILY
Qty: 12 CAPSULE | Refills: 0 | Status: SHIPPED | OUTPATIENT
Start: 2022-03-03 | End: 2022-03-06

## 2022-03-03 RX ORDER — LABETALOL HYDROCHLORIDE 5 MG/ML
5 INJECTION, SOLUTION INTRAVENOUS
Status: DISCONTINUED | OUTPATIENT
Start: 2022-03-03 | End: 2022-03-03 | Stop reason: HOSPADM

## 2022-03-03 RX ORDER — ONDANSETRON 2 MG/ML
4 INJECTION INTRAMUSCULAR; INTRAVENOUS
Status: DISCONTINUED | OUTPATIENT
Start: 2022-03-03 | End: 2022-03-03 | Stop reason: HOSPADM

## 2022-03-03 RX ORDER — HYDROMORPHONE HCL 110MG/55ML
0.25 PATIENT CONTROLLED ANALGESIA SYRINGE INTRAVENOUS EVERY 5 MIN PRN
Status: DISCONTINUED | OUTPATIENT
Start: 2022-03-03 | End: 2022-03-03 | Stop reason: HOSPADM

## 2022-03-03 RX ORDER — PROPOFOL 10 MG/ML
INJECTION, EMULSION INTRAVENOUS PRN
Status: DISCONTINUED | OUTPATIENT
Start: 2022-03-03 | End: 2022-03-03 | Stop reason: SDUPTHER

## 2022-03-03 RX ORDER — OXYCODONE HYDROCHLORIDE 5 MG/1
5 TABLET ORAL
Status: DISCONTINUED | OUTPATIENT
Start: 2022-03-03 | End: 2022-03-03 | Stop reason: HOSPADM

## 2022-03-03 RX ORDER — HYDROMORPHONE HCL 110MG/55ML
0.5 PATIENT CONTROLLED ANALGESIA SYRINGE INTRAVENOUS EVERY 5 MIN PRN
Status: DISCONTINUED | OUTPATIENT
Start: 2022-03-03 | End: 2022-03-03 | Stop reason: HOSPADM

## 2022-03-03 RX ORDER — LIDOCAINE HYDROCHLORIDE 10 MG/ML
1 INJECTION, SOLUTION EPIDURAL; INFILTRATION; INTRACAUDAL; PERINEURAL
Status: DISCONTINUED | OUTPATIENT
Start: 2022-03-03 | End: 2022-03-03 | Stop reason: HOSPADM

## 2022-03-03 RX ORDER — LIDOCAINE HYDROCHLORIDE 20 MG/ML
INJECTION, SOLUTION EPIDURAL; INFILTRATION; INTRACAUDAL; PERINEURAL PRN
Status: DISCONTINUED | OUTPATIENT
Start: 2022-03-03 | End: 2022-03-03 | Stop reason: SDUPTHER

## 2022-03-03 RX ORDER — FENTANYL CITRATE 50 UG/ML
INJECTION, SOLUTION INTRAMUSCULAR; INTRAVENOUS PRN
Status: DISCONTINUED | OUTPATIENT
Start: 2022-03-03 | End: 2022-03-03 | Stop reason: SDUPTHER

## 2022-03-03 RX ADMIN — DEXAMETHASONE SODIUM PHOSPHATE 6 MG: 4 INJECTION, SOLUTION INTRAMUSCULAR; INTRAVENOUS at 07:19

## 2022-03-03 RX ADMIN — LIDOCAINE HYDROCHLORIDE 50 MG: 20 INJECTION, SOLUTION EPIDURAL; INFILTRATION; INTRACAUDAL; PERINEURAL at 07:14

## 2022-03-03 RX ADMIN — CEFAZOLIN 2000 MG: 10 INJECTION, POWDER, FOR SOLUTION INTRAVENOUS at 07:12

## 2022-03-03 RX ADMIN — FENTANYL CITRATE 50 MCG: 50 INJECTION, SOLUTION INTRAMUSCULAR; INTRAVENOUS at 07:21

## 2022-03-03 RX ADMIN — PROPOFOL 200 MG: 10 INJECTION, EMULSION INTRAVENOUS at 07:14

## 2022-03-03 RX ADMIN — SODIUM CHLORIDE: 9 INJECTION, SOLUTION INTRAVENOUS at 07:12

## 2022-03-03 RX ADMIN — FENTANYL CITRATE 50 MCG: 50 INJECTION, SOLUTION INTRAMUSCULAR; INTRAVENOUS at 07:22

## 2022-03-03 RX ADMIN — KETOROLAC TROMETHAMINE 30 MG: 60 INJECTION, SOLUTION INTRAMUSCULAR at 07:21

## 2022-03-03 RX ADMIN — ONDANSETRON 4 MG: 2 INJECTION INTRAMUSCULAR; INTRAVENOUS at 07:19

## 2022-03-03 ASSESSMENT — PULMONARY FUNCTION TESTS
PIF_VALUE: 2
PIF_VALUE: 13
PIF_VALUE: 25
PIF_VALUE: 1
PIF_VALUE: 2
PIF_VALUE: 11
PIF_VALUE: 2
PIF_VALUE: 2
PIF_VALUE: 0
PIF_VALUE: 4
PIF_VALUE: 0
PIF_VALUE: 10
PIF_VALUE: 9
PIF_VALUE: 4
PIF_VALUE: 2
PIF_VALUE: 1

## 2022-03-03 ASSESSMENT — PAIN - FUNCTIONAL ASSESSMENT: PAIN_FUNCTIONAL_ASSESSMENT: 0-10

## 2022-03-03 ASSESSMENT — LIFESTYLE VARIABLES: SMOKING_STATUS: 1

## 2022-03-03 NOTE — PROGRESS NOTES
Discharge instructions reviewed with patient/son/. All home medications have been reviewed, questions answered and patient verbalized understanding. Discharge instructions signed. Pt pt on hold,  waiting for Dr. Alexandru Rondon to sign prescription. Will continue to monitor.

## 2022-03-03 NOTE — PROGRESS NOTES
Pt awake and alert. Pt on RA, VSS.  at the bedside, son in the waiting room. Pt denies pain and nausea, tolerating PO. Skin warm RUE, able to wiggle fingers. Pt meets criteria to be discharged from phase 1.

## 2022-03-03 NOTE — H&P
Preoperative H&P Update    The patient's History and Physical in the medical record from 2/24/2022 was reviewed by me today. Past Medical History:   Diagnosis Date    Diabetes mellitus ([address]     Hypertension      History reviewed. No pertinent surgical history. No current facility-administered medications on file prior to encounter. Current Outpatient Medications on File Prior to Encounter   Medication Sig Dispense Refill    hydroCHLOROthiazide (HYDRODIURIL) 25 MG tablet Take 1 tablet by mouth daily for 30 doses 30 tablet 0    sodium chloride (OCEAN) 0.65 % nasal spray 1 spray by Nasal route as needed for Congestion 1 Bottle 0    metFORMIN (GLUCOPHAGE) 500 MG tablet Take 500 mg by mouth 2 times daily (with meals)      [DISCONTINUED] ibuprofen (ADVIL;MOTRIN) 600 MG tablet Take 1 tablet by mouth every 8 hours as needed for Pain 30 tablet 0    [DISCONTINUED] naproxen (NAPROSYN) 500 MG tablet Take 1 tablet by mouth 2 times daily (with meals) 30 tablet 0       No Known Allergies   I reviewed the HPI, medications, allergies, reason for surgery, diagnosis and treatment plan and there has been no change. The patient was evaluated by me today. Physical exam findings for this update include: There were no vitals filed for this visit. Airway is intact  Chest: chest clear, no wheezing, rales, normal symmetric air entry, no tachypnea, retractions or cyanosis  Heart: regular rate and rhythm ; heart sounds normal  Findings on exam of the body region where surgery is to be performed include:  Right hand long (middle) trigger finger.     Electronically signed by Jed Peralta MD on 3/3/2022 at 6:46 AM

## 2022-03-03 NOTE — BRIEF OP NOTE
Brief Postoperative Note      Patient: Elisabeth Crandall  YOB: 1960  MRN: [id number]    Date of Procedure: 3/3/2022    Pre-Op Diagnosis: M65.30  RIGHT TRIGGER LONG FINGER    Post-Op Diagnosis: Same       Procedure(s):  RIGHT LONG FINGER TRIGGER FINGER RELEASE    Surgeon(s):  Gladys Dalton MD    Assistant:  First Assistant: Rochelle Chavez    Anesthesia: General    Estimated Blood Loss (mL): Minimal    Complications: None    Specimens:   * No specimens in log *    Implants:  * No implants in log *      Drains: * No LDAs found *    Findings: SAME    Electronically signed by Gladys Dalton MD on 3/3/2022 at 12:48 PM

## 2022-03-03 NOTE — PROGRESS NOTES
Pt arrived from OR to PACU bay 4. Report received from OR staff. Surgical dressing in place to right hand. Pt on RA, oral airway in place, NSR, VSS. Will continue to monitor.

## 2022-03-03 NOTE — PROGRESS NOTES
Pt dc'd per wheelchair. Patient discharged home with simple sling, two prescriptions and other belongings. Son taking stable pt home.
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
## 2022-03-04 NOTE — OP NOTE
[address]                     [address] [facility], [address] [place] [address]                                OPERATIVE REPORT    PATIENT NAME: Johnathon Gonzalez                   :        1960  MED REC NO:   [id number]                          ROOM:  ACCOUNT NO:   [de-identified]                           ADMIT DATE: 2022  PROVIDER:     Crystal Adams MD    DATE OF PROCEDURE:  2022    PRIMARY CARE PHYSICIAN:  Valerie Calderon NP    PREOPERATIVE DIAGNOSIS:  Right hand long trigger finger. POSTOPERATIVE DIAGNOSIS:  Right hand long trigger finger. OPERATION PERFORMED:  Incision tendon sheath A1 pulley right hand long  trigger finger. SURGEON:  Crystal Adams MD    ASSISTANTCurclaire Matos, surgical assistant. ANESTHESIA:  General anesthesia. ESTIMATED BLOOD LOSS:  Minimal.    COMPLICATIONS:  None. TOURNIQUET:  Right upper forearm 250 mmHg. INDICATIONS:  This is a 69-year-old [place] male who presented to our  office with a right long trigger finger. He is a right hand dominant. The patient elected to proceed with surgical treatment as he failed  injection in the past.  All risks, benefits, and alternatives were  discussed with the patient. He agreed to proceed with the surgical  release. DESCRIPTION OF THE PROCEDURE:  The patient's right hand was marked. He  received 2 gm Ancef IV preoperatively. He was then brought to the  operating room and underwent general anesthesia. A well-padded  tourniquet was placed in the right upper forearm. The right upper  extremity was then prepped and draped in a regular sterile routine  fashion. A time-out was called confirming the patient name, site, and  procedure. Esmarch was used for exsanguination and tourniquet was inflated to 250  mmHg. A transverse incision was made over the distal palmar crease. Careful dissection was performed exposing the long finger flexor  tendon.   There was thickening of the tendon sheath. We used a 15 blade  to open up the tendon sheath released with tenotomy scissors both  proximally and distally. At this point, we were able to move the tendon  freely without any issue with the tendon moving. At this point, we let  the tourniquet down and hemostasis was secured. We irrigated the  incision copiously with normal saline. We then closed the skin with a  4-0 nylon. Dressing was then applied in the form of Xeroform, 4 x 4,  sterile Webril and Ace bandage. The patient tolerated the procedure well and was taken to the recovery  in stable condition. POSTOPERATIVE PLAN:  The patient will be discharged home. He will be  starting immediate range of motion, but nonweightbearing for two weeks.         Franklyn Bell MD    D: 03/03/2022 12:52:44       T: 03/03/2022 23:34:59     /[email or web address]  Job#: [id number]     Doc#: [id number]    CC:  Josee Albright NP

## 2022-03-09 RX ORDER — CEPHALEXIN 500 MG/1
CAPSULE ORAL
Qty: 12 CAPSULE | OUTPATIENT
Start: 2022-03-09

## 2022-03-09 RX ORDER — TRAMADOL HYDROCHLORIDE 50 MG/1
TABLET ORAL
Qty: 12 TABLET | Refills: 0 | OUTPATIENT
Start: 2022-03-09

## 2022-03-17 ENCOUNTER — OFFICE VISIT (OUTPATIENT)
Dept: ORTHOPEDIC SURGERY | Age: 62
End: 2022-03-17

## 2022-03-17 VITALS — HEIGHT: 64 IN | BODY MASS INDEX: 23.86 KG/M2

## 2022-03-17 DIAGNOSIS — M65.331 TRIGGER MIDDLE FINGER OF RIGHT HAND: Primary | ICD-10-CM

## 2022-03-17 PROCEDURE — 99024 POSTOP FOLLOW-UP VISIT: CPT | Performed by: NURSE PRACTITIONER

## 2022-03-17 PROCEDURE — APPNB15 APP NON BILLABLE TIME 0-15 MINS: Performed by: NURSE PRACTITIONER

## 2022-03-17 NOTE — LETTER
[facility]  [address] [place] [address] [address]  Phone: [phone]  Fax: [phone]    Acacia Kuhn MD        March 17, 2022     Patient: Mirna Raines   YOB: 1960   Date of Visit: 3/17/2022       To Whom It May Concern: It is my medical opinion that Keily Pavon was seen in office today. He may return to work light duty for 2 weeks; no lifting greater than 5 pounds for 2 weeks. No restrictions after 4/1/22. If you have any questions or concerns, please don't hesitate to call.     Sincerely,        Acacia Kuhn MD

## 2022-03-18 NOTE — PROGRESS NOTES
DIAGNOSIS:  Right long finger trigger release. DATE OF SURGERY:  3/3/2022. HISTORY OF PRESENT ILLNESS:  Mr. Mj Guzman 58 y.o. [place] male who came in today for 2 weeks postoperative visit. The patient denies any significant pain in the right middle finger. He has been working on ROM. No numbness or tingling sensation. No fever or Chills. PHYSICAL EXAMINATION:  The incision healing well . No signs of any erythema or drainage, minimal swelling. He has no pain with the active or passive range of motion of the right long finger, good ROM. He has intact sensation distally, and he is neurovascularly intact. IMPRESSION:  2 weeks out from right long finger trigger release, and doing very well. PLAN: He can go back to normal activity with no heavy impact activities for 6 weeks. The patient will come back for a follow up in 3 months PRN. The patient understands that there is a chance of trigger finger recurrence even after surgery.       Estuardo Hodges, APRN - CNP

## 2022-03-29 ENCOUNTER — OFFICE VISIT (OUTPATIENT)
Dept: ORTHOPEDIC SURGERY | Age: 62
End: 2022-03-29
Payer: COMMERCIAL

## 2022-03-29 VITALS — HEIGHT: 64 IN | WEIGHT: 139 LBS | BODY MASS INDEX: 23.73 KG/M2

## 2022-03-29 DIAGNOSIS — M67.912 TENDINOPATHY OF LEFT SHOULDER: Primary | ICD-10-CM

## 2022-03-29 PROCEDURE — G8427 DOCREV CUR MEDS BY ELIG CLIN: HCPCS | Performed by: ORTHOPAEDIC SURGERY

## 2022-03-29 PROCEDURE — 3017F COLORECTAL CA SCREEN DOC REV: CPT | Performed by: ORTHOPAEDIC SURGERY

## 2022-03-29 PROCEDURE — G8484 FLU IMMUNIZE NO ADMIN: HCPCS | Performed by: ORTHOPAEDIC SURGERY

## 2022-03-29 PROCEDURE — 20610 DRAIN/INJ JOINT/BURSA W/O US: CPT | Performed by: ORTHOPAEDIC SURGERY

## 2022-03-29 PROCEDURE — 4004F PT TOBACCO SCREEN RCVD TLK: CPT | Performed by: ORTHOPAEDIC SURGERY

## 2022-03-29 PROCEDURE — G8420 CALC BMI NORM PARAMETERS: HCPCS | Performed by: ORTHOPAEDIC SURGERY

## 2022-03-29 PROCEDURE — 99213 OFFICE O/P EST LOW 20 MIN: CPT | Performed by: ORTHOPAEDIC SURGERY

## 2022-03-29 RX ORDER — LIDOCAINE HYDROCHLORIDE 10 MG/ML
40 INJECTION, SOLUTION INFILTRATION; PERINEURAL ONCE
Status: COMPLETED | OUTPATIENT
Start: 2022-03-29 | End: 2022-03-29

## 2022-03-29 RX ORDER — TRIAMCINOLONE ACETONIDE 40 MG/ML
40 INJECTION, SUSPENSION INTRA-ARTICULAR; INTRAMUSCULAR ONCE
Status: COMPLETED | OUTPATIENT
Start: 2022-03-29 | End: 2022-03-29

## 2022-03-29 RX ADMIN — LIDOCAINE HYDROCHLORIDE 40 MG: 10 INJECTION, SOLUTION INFILTRATION; PERINEURAL at 13:49

## 2022-03-29 RX ADMIN — TRIAMCINOLONE ACETONIDE 40 MG: 40 INJECTION, SUSPENSION INTRA-ARTICULAR; INTRAMUSCULAR at 13:51

## 2022-03-29 NOTE — PROGRESS NOTES
CHIEF COMPLAINT:   1- Right long (middle) finger pain/ trigger finger, surgery 3/3/2022. 2- Left shoulder pain/ cuff tendinopathy/ impingement syndrome. HISTORY:  Mr. Lorrie Cerrato is 58 y.o. Armenian male right handed presents today for evaluation of left shoulder pain which started Feb 2022. He is complaining of sharp pain. Pain is increase with elevation and decrease with rest. No radiation and no numbness and tingling sensation. No other complaint. He had right long (middle) finger trigger finger, surgery 3/3/2022, and doing well with that. Past Medical History:   Diagnosis Date    Diabetes mellitus ([facility] [address]     Hypertension        Past Surgical History:   Procedure Laterality Date    FINGER TRIGGER RELEASE Right 3/3/2022    RIGHT LONG FINGER TRIGGER FINGER RELEASE performed by Merari Carrillo MD at [address] [place] [address] History     Socioeconomic History    Marital status:      Spouse name: Not on file    Number of children: Not on file    Years of education: Not on file    Highest education level: Not on file   Occupational History    Not on file   Tobacco Use    Smoking status: Current Every Day Smoker    Smokeless tobacco: Never Used   Vaping Use    Vaping Use: Never used   Substance and Sexual Activity    Alcohol use: No    Drug use: No    Sexual activity: Not on file   Other Topics Concern    Not on file   Social History Narrative    Not on file     Social Determinants of Health     Financial Resource Strain:     Difficulty of Paying Living Expenses: Not on file   Food Insecurity:     Worried About [address] in the Last Year: Not on file    [address] in the Last Year: Not on file   Transportation Needs:     Lack of Transportation (Medical): Not on file    Lack of Transportation (Non-Medical):  Not on file   Physical Activity:     Days of Exercise per Week: Not on file    Minutes of Exercise per Session: Not on file   Stress:     Feeling of Stress : Not on file Social Connections:     Frequency of Communication with Friends and Family: Not on file    Frequency of Social Gatherings with Friends and Family: Not on file    Attends Alevism Services: Not on file    Active Member of Clubs or Organizations: Not on file    Attends Club or Organization Meetings: Not on file    Marital Status: Not on file   Intimate Partner Violence:     Fear of Current or Ex-Partner: Not on file    Emotionally Abused: Not on file    Physically Abused: Not on file    Sexually Abused: Not on file   Housing Stability:     Unable to Pay for Housing in the Last Year: Not on file    Number of [place] in the Last Year: Not on file    Unstable Housing in the Last Year: Not on file       No family history on file. Current Outpatient Medications on File Prior to Visit   Medication Sig Dispense Refill    hydroCHLOROthiazide (HYDRODIURIL) 25 MG tablet Take 1 tablet by mouth daily for 30 doses 30 tablet 0    sodium chloride (OCEAN) 0.65 % nasal spray 1 spray by Nasal route as needed for Congestion 1 Bottle 0    [DISCONTINUED] ibuprofen (ADVIL;MOTRIN) 600 MG tablet Take 1 tablet by mouth every 8 hours as needed for Pain 30 tablet 0    metFORMIN (GLUCOPHAGE) 500 MG tablet Take 500 mg by mouth 2 times daily (with meals)      [DISCONTINUED] naproxen (NAPROSYN) 500 MG tablet Take 1 tablet by mouth 2 times daily (with meals) 30 tablet 0     No current facility-administered medications on file prior to visit. Pertinent items are noted in HPI  Review of systems reviewed from Patient History Form and available in the patient's chart under the Media tab. No change noted. PHYSICAL EXAMINATION:  Mr. Deidre Gaucher is a very pleasant 58 y.o. Hungarian male who presents today in no acute distress, awake, alert, and oriented. He is well dressed, nourished and  groomed. Patient with normal affect. Height is  5' 4\" (1.626 m), weight is 139 lb (63 kg), Body mass index is 23.86 kg/m².   Resting respiratory rate is 16. Examination of the gait, showed that the patient walks with no limp . Examination of both upper extremities showing a good range of motion with no triggering of the right long (middle) finger compare to the other side. There is mild swelling that can be seen with tenderness over A1 pulley. Examination of the gait, showed that the patient walks heel-toe with a non-antalgic gait and no limp. On evaluation of the left shoulder, range of motion is 90 degree in flexion and 90 degree in abduction. There is positve impingement signs. Motor and sensation is intact and symmetric throughout the bilateral upper extremities in the median, ulnar and radial nerve distributions. He has 2+ radial pulses bilaterally. IMAGING: Ceclia Pfeiffer were reviewed, taken 2/24/2022 in the office, 3 views of the right hand, and showed no fracture. X-rays were taken in the office 2/24/2022, 3 views of the left shoulder, and showed no acute fracture. Mild cuff arthropathy. IMPRESSION:  1- Right long (middle) finger pain/ trigger finger, surgery 3/3/2022. 2- Left shoulder pain/ cuff tendinopathy/ impingement syndrome. PLAN:  I discussed with [address] the treatment options including both surgical and non-surgical treatment, and that my recommendation is to work on ROM and muscle exercises left shoulder. I believe he may benefit from cortisone injection left shoulder. PROCEDURE:    With the patient's permission, and under sterile condition, the left shoulder was prepared and draped with alcohol and sub-acromial space was injected with a mixture of 1 ml Kenalog 40mg and 4 ml of 1% lidocaine. The patient tolerated the procedure well. A band-aid was applied and the patient was advised to ice the shoulder for 15-20 minutes to relieve any injection site related pain. He reported a good improvement immediatly after the injection.   F/U in 3-4 months        Gladys Dalton MD

## 2022-04-05 ENCOUNTER — HOSPITAL ENCOUNTER (EMERGENCY)
Age: 62
Discharge: HOME | End: 2022-04-05
Attending: EMERGENCY MEDICINE
Payer: MEDICARE

## 2022-04-05 ENCOUNTER — APPOINTMENT (OUTPATIENT)
Dept: CT IMAGING | Age: 62
End: 2022-04-05
Payer: MEDICARE

## 2022-04-05 VITALS
OXYGEN SATURATION: 99 % | HEART RATE: 112 BPM | WEIGHT: 165 LBS | TEMPERATURE: 98 F | DIASTOLIC BLOOD PRESSURE: 87 MMHG | SYSTOLIC BLOOD PRESSURE: 128 MMHG | HEIGHT: 63 IN | BODY MASS INDEX: 29.23 KG/M2 | RESPIRATION RATE: 16 BRPM

## 2022-04-05 DIAGNOSIS — N32.3 BLADDER DIVERTICULUM: ICD-10-CM

## 2022-04-05 DIAGNOSIS — R39.89 BLADDER PAIN: Primary | ICD-10-CM

## 2022-04-05 DIAGNOSIS — N32.89 BLADDER MASS: ICD-10-CM

## 2022-04-05 PROCEDURE — 99284 EMERGENCY DEPT VISIT MOD MDM: CPT

## 2022-04-05 PROCEDURE — 74176 CT ABD & PELVIS W/O CONTRAST: CPT

## 2022-04-05 PROCEDURE — 6370000000 HC RX 637 (ALT 250 FOR IP): Performed by: EMERGENCY MEDICINE

## 2022-04-05 RX ORDER — TAMSULOSIN HYDROCHLORIDE 0.4 MG/1
0.4 CAPSULE ORAL DAILY
Qty: 90 CAPSULE | Refills: 1 | Status: SHIPPED | OUTPATIENT
Start: 2022-04-05

## 2022-04-05 RX ORDER — HYDROCODONE BITARTRATE AND ACETAMINOPHEN 5; 325 MG/1; MG/1
1 TABLET ORAL ONCE
Status: COMPLETED | OUTPATIENT
Start: 2022-04-05 | End: 2022-04-05

## 2022-04-05 RX ORDER — ONDANSETRON 4 MG/1
4 TABLET, ORALLY DISINTEGRATING ORAL ONCE
Status: COMPLETED | OUTPATIENT
Start: 2022-04-05 | End: 2022-04-05

## 2022-04-05 RX ORDER — HYDROCODONE BITARTRATE AND ACETAMINOPHEN 5; 325 MG/1; MG/1
1 TABLET ORAL EVERY 4 HOURS PRN
Qty: 18 TABLET | Refills: 0 | Status: SHIPPED | OUTPATIENT
Start: 2022-04-05 | End: 2022-04-05 | Stop reason: SDUPTHER

## 2022-04-05 RX ORDER — TAMSULOSIN HYDROCHLORIDE 0.4 MG/1
0.4 CAPSULE ORAL DAILY
Qty: 90 CAPSULE | Refills: 1 | Status: SHIPPED | OUTPATIENT
Start: 2022-04-05 | End: 2022-04-05 | Stop reason: SDUPTHER

## 2022-04-05 RX ORDER — CEFUROXIME AXETIL 250 MG/1
250 TABLET ORAL 2 TIMES DAILY
Qty: 14 TABLET | Refills: 0 | Status: SHIPPED | OUTPATIENT
Start: 2022-04-05 | End: 2022-04-05 | Stop reason: SDUPTHER

## 2022-04-05 RX ORDER — HYDROCODONE BITARTRATE AND ACETAMINOPHEN 5; 325 MG/1; MG/1
1 TABLET ORAL EVERY 4 HOURS PRN
Qty: 18 TABLET | Refills: 0 | Status: SHIPPED | OUTPATIENT
Start: 2022-04-05 | End: 2022-04-08

## 2022-04-05 RX ORDER — CEFUROXIME AXETIL 250 MG/1
250 TABLET ORAL 2 TIMES DAILY
Qty: 14 TABLET | Refills: 0 | Status: SHIPPED | OUTPATIENT
Start: 2022-04-05 | End: 2022-04-12

## 2022-04-05 RX ADMIN — HYDROCODONE BITARTRATE AND ACETAMINOPHEN 1 TABLET: 5; 325 TABLET ORAL at 14:22

## 2022-04-05 RX ADMIN — ONDANSETRON 4 MG: 4 TABLET, ORALLY DISINTEGRATING ORAL at 14:23

## 2022-04-05 ASSESSMENT — PAIN - FUNCTIONAL ASSESSMENT: PAIN_FUNCTIONAL_ASSESSMENT: 0-10

## 2022-04-05 ASSESSMENT — PAIN SCALES - GENERAL: PAINLEVEL_OUTOF10: 10

## 2022-04-05 NOTE — ED PROVIDER NOTES
Emergency Department Encounter    Patient: Vale Jones  MRN: [id number]  : 1960  Date of Evaluation: 2022  ED Provider:  Gustavo Florentino MD    Triage Chief Complaint:   CHRONIC PAIN ABDOMINAL  [place]:  Vale Jones is a 58 y.o. male that presents to the ER for evaluation of acute on chronic abdominal pain left lower quadrant no hematuria no rectal bleeding, he has been having intermittent abdominal pain since 2014 as per patient. No recent imaging no chest pain pressure acute shortness of breath. No rectal bleeding or hematuria. No weight loss or night sweats. No alleviating factors. Duration of symptoms it acutely has been 4 days  ROS - see HPI, below listed is current ROS at time of my eval:  General:  No fevers, no chills, no weakness  Eyes:  no discharge  ENT:  No sore throat, no nasal congestion  Cardiovascular:  No chest pain, no palpitations  Respiratory:  No shortness of breath, no cough, no wheezing  Gastrointestinal:  + pain, + nausea, no vomiting, no diarrhea  Musculoskeletal:  No muscle pain, no joint pain  Skin:  No rash, no pruritis  Neurologic:  no headache  Genitourinary:  No dysuria, no hematuria  Endocrine:  No unexpected weight gain, no unexpected weight loss  Extremities:  no edema, no pain    Past Medical History:   Diagnosis Date    Diabetes mellitus ([facility] [address]     Hypertension      Past Surgical History:   Procedure Laterality Date    FINGER TRIGGER RELEASE Right 3/3/2022    RIGHT LONG FINGER TRIGGER FINGER RELEASE performed by [facility]     No family history on file.   Social History     Socioeconomic History    Marital status:      Spouse name: Not on file    Number of children: Not on file    Years of education: Not on file    Highest education level: Not on file   Occupational History    Not on file   Tobacco Use    Smoking status: Current Every Day Smoker    Smokeless tobacco: Never Used   Vaping Use    Vaping Use: Never used Substance and Sexual Activity    Alcohol use: No    Drug use: No    Sexual activity: Not on file   Other Topics Concern    Not on file   Social History Narrative    Not on file     Social Determinants of Health     Financial Resource Strain:     Difficulty of Paying Living Expenses: Not on file   Food Insecurity:     Worried About Running Out of Food in the Last Year: Not on file    Ivet of Food in the Last Year: Not on file   Transportation Needs:     Lack of Transportation (Medical): Not on file    Lack of Transportation (Non-Medical): Not on file   Physical Activity:     Days of Exercise per Week: Not on file    Minutes of Exercise per Session: Not on file   Stress:     Feeling of Stress : Not on file   Social Connections:     Frequency of Communication with Friends and Family: Not on file    Frequency of Social Gatherings with Friends and Family: Not on file    Attends Taoism Services: Not on file    Active Member of [address] [place] [address] or Organizations: Not on file    Attends Club or Organization Meetings: Not on file    Marital Status: Not on file   Intimate Partner Violence:     Fear of Current or Ex-Partner: Not on file    Emotionally Abused: Not on file    Physically Abused: Not on file    Sexually Abused: Not on file   Housing Stability:     Unable to Pay for Housing in the Last Year: Not on file    Number of [place] in the Last Year: Not on file    Unstable Housing in the Last Year: Not on file     No current facility-administered medications for this encounter. Current Outpatient Medications   Medication Sig Dispense Refill    cefUROXime (CEFTIN) 250 MG tablet Take 1 tablet by mouth 2 times daily for 7 days 14 tablet 0    tamsulosin (FLOMAX) 0.4 MG capsule Take 1 capsule by mouth daily 90 capsule 1    HYDROcodone-acetaminophen (NORCO) 5-325 MG per tablet Take 1 tablet by mouth every 4 hours as needed for Pain for up to 3 days. Intended supply: 3 days.  Take lowest dose possible to manage pain 18 tablet 0    hydroCHLOROthiazide (HYDRODIURIL) 25 MG tablet Take 1 tablet by mouth daily for 30 doses 30 tablet 0    sodium chloride (OCEAN) 0.65 % nasal spray 1 spray by Nasal route as needed for Congestion 1 Bottle 0    metFORMIN (GLUCOPHAGE) 500 MG tablet Take 500 mg by mouth 2 times daily (with meals)       No Known Allergies    Nursing Notes Reviewed    Physical Exam:  Triage VS:    ED Triage Vitals [04/05/22 1415]   Enc Vitals Group      /87      Pulse 112      Resp 16      Temp 98 °F (36.7 °C)      Temp Source Oral      SpO2 99 %      Weight       Height       Head Circumference       Peak Flow       Pain Score       Pain Loc       Pain Edu? Excl. in [address]? My pulse ox interpretation is - normal    General appearance:  No acute distress. Skin:  Warm. Dry. No rash. Neck:  Trachea midline. Heart:  Regular rate and rhythm, normal S1 & S2.    Perfusion:  intact  Respiratory:  Lungs clear to auscultation bilaterally. Respirations nonlabored. Abdominal:  + llq  tenderness to palpation, no rebound guarding or rigidity, neg Miller's sign, neg McBurney's point tenderness to palpation, normal bowel sounds, no masses, no organomegaly, no pulsatile masses  Back:  No CVA TTP  Extremity:    normal ROM   Neurological:  Alert and oriented times 3. I have reviewed and interpreted all of the currently available lab results from this visit (if applicable):  No results found for this visit on 04/05/22. Radiographs (if obtained):  Radiologist's Report Reviewed:  No results found. EKG (if obtained): (All EKG's are interpreted by myself in the absence of a cardiologist)      MDM:  Patient here with abdominal pain. I estimate there is LOW risk for an acute emergent intraabdominal process including, but not limited to, acute appendicitis, bowel obstruction, acute cholecystitis, ruptured diverticulitis, incarcerated/strangling hernia,  perforated bowel/ulcer.     Workup reveals diverticular strain with possible inflammatory versus infectious component of the bladder diverticulum, mass cannot be excluded. Outpatient follow-up urology for cystoscopy oral antibiotics analgesia Flomax, return if worse or new symptoms. For outpatient management    Patient's abdominal exam in the ED is nonsurgical.  I do feel the Patient can be discharged home. I have discussed the results, plan for treatment and possible risks, and patient agrees with discharging home with close follow-up. Patient understands and agrees with the plan, return warnings given. We have discussed the symptoms which are most concerning that necessitate immediate return. Clinical Impression:  1. Bladder pain    2. Bladder mass    3. Bladder diverticulum      Disposition referral (if applicable): Fabiola Betancourt MD  [facility]  [phone]          Disposition medications (if applicable):  New Prescriptions    CEFUROXIME (CEFTIN) 250 MG TABLET    Take 1 tablet by mouth 2 times daily for 7 days    HYDROCODONE-ACETAMINOPHEN (NORCO) 5-325 MG PER TABLET    Take 1 tablet by mouth every 4 hours as needed for Pain for up to 3 days. Intended supply: 3 days. Take lowest dose possible to manage pain    TAMSULOSIN (FLOMAX) 0.4 MG CAPSULE    Take 1 capsule by mouth daily       Comment: Please note this report has been produced using speech recognition software and may contain errors related to that system including errors in grammar, punctuation, and spelling, as well as words and phrases that may be inappropriate. Efforts were made to edit the dictations.       Linda Watters MD  67/00/88 [id number]

## 2022-04-05 NOTE — ED TRIAGE NOTES
Pt here via family from home.  Has had a swollen testicle and abdominal pain for the last few days/ States pain is 10/10

## 2022-04-05 NOTE — LETTER
[facility] Emergency Department  [address] [facility], [address]             April 5, 2022    Patient: Leola Bull   YOB: 1960   Date of Visit: 4/5/2022       To Whom It May Concern: Jose Alejandro Jones was seen and treated in our emergency department on 4/5/2022. He may return to work on 04/05/2022.       Sincerely,         Crys Gaitan RN

## 2022-06-22 ENCOUNTER — HOSPITAL ENCOUNTER (OUTPATIENT)
Dept: PHYSICAL THERAPY | Age: 62
Setting detail: THERAPIES SERIES
Discharge: HOME | End: 2022-06-22
Payer: COMMERCIAL

## 2022-06-22 NOTE — FLOWSHEET NOTE
Physical Therapy  Cancellation/No-show Note  Patient Name:  Trinh Harkins  :  1960   Date:  2022  Cancelled visits to date: 0  No-shows to date: 1 eval    Patient status for today's appointment patient:  []  Cancelled  []  Rescheduled appointment  [x]  No-show    Reason given by patient:  []  Patient ill  []  Conflicting appointment  []  No transportation    []  Conflict with work  [x]  No reason given  []  Other:     Comments:      Phone call information:   []  Phone call made today to patient at _ time at number provided:      []  Patient answered, conversation as follows:    []  Patient did not answer, message left as follows:  [x]  Phone call not made today  []  Phone call not needed - pt contacted us to cancel and provided reason for cancellation. Electronically signed by:   Nori Ram, PT  DPT ATC

## 2022-06-30 ENCOUNTER — HOSPITAL ENCOUNTER (OUTPATIENT)
Dept: PHYSICAL THERAPY | Age: 62
Setting detail: THERAPIES SERIES
Discharge: HOME | End: 2022-06-30
Payer: COMMERCIAL

## 2022-06-30 PROCEDURE — 97162 PT EVAL MOD COMPLEX 30 MIN: CPT

## 2022-06-30 PROCEDURE — 97140 MANUAL THERAPY 1/> REGIONS: CPT

## 2022-06-30 PROCEDURE — 97110 THERAPEUTIC EXERCISES: CPT

## 2022-06-30 NOTE — FLOWSHEET NOTE
[address] [place] [address]  Phone: [phone]   Fax: [phone]    Physical Therapy Treatment Note/ Progress Report:     Date:  2022    Patient Name:  Kimi Carrizales    :  1960  MRN: [id number]  Restrictions/Precautions:    Medical/Treatment Diagnosis Information:  · Diagnosis: M25.512 (ICD-10-CM) - Pain in left shoulder  · Treatment Diagnosis: L shoulder pain, hypomobility L GHJ, dec L shoulder strength, impaired posture  Insurance/Certification information:  PT Insurance Information: [place]  Physician Information:  Stefanie De Luna NP    Plan of care signed (Y/N): []  Yes []  No     Date of Patient follow up with Physician:      Progress Report: []  Yes  []  No     Date Range for reporting period:  Beginnin/30  Ending:     Progress report due (10 Rx/or 30 days whichever is less): visit #10 or  (date)     Recertification due (POC duration/ or 90 days whichever is less): visit #14 or  (date)     Visit # Insurance Allowable Auth required?  Date Range   1   0 used as of 22 []  Yes  [x]  No        Latex Allergy:  [x]NO      []YES  Preferred Language for Healthcare:   []English       [x]other: Sinhala    Functional Scale:           Date assessed:  TO physical FS primary measure score = 50; risk adjusted = 50  22     Pain level:  8/10     SUBJECTIVE:  See eval    OBJECTIVE: See eval   Observation:    Test measurements:      RESTRICTIONS/PRECAUTIONS: HTN, DM    Exercises/Interventions:   Therapeutic Exercise (87006) Resistance / level Sets / Seconds  Reps Notes/Cues   UBE       Doorway pec stretch       Wall slide with focus on lat stretch       SB wall walks: flexion with thorac ext  - rainbows (IR/ER)       TB:  - LPD  - High rows  - Rows  - IR  - ER              Prone: I, Y, T's                            HEP review  Per HEP Per HEP 15'   Therapeutic Activities (20362)                                          Neuromuscular Re-ed (27688)       Wall push ups       Ball on wall       Plank weight shifts                     Manual Intervention ([id number])       Shld /GH Mobs: inf and post GrIII&IV 6x10\"  6/30: pt reported dec pain following tx                                          Modalities:     Pt. Education:  -pt educated on diagnosis, prognosis and expectations for rehab  -all pt questions were answered    Home Exercise Program:  Access Code: [email or web address]  URL: [email or web address]  Date: 06/30/2022  Prepared by: Mei Murillo    Exercises  Scaption Wall Slide with Towel (Mirrored) - 1 x daily - 7 x weekly - 2 sets - 10 reps  Standing Shoulder Posterior Capsule Stretch - 1 x daily - 7 x weekly - 1 sets - 10 reps - 10 hold  Doorway Pec Stretch at 90 Degrees Abduction - 1 x daily - 7 x weekly - 1 sets - 10 reps - 10 hold      Therapeutic Exercise and NMR EXR  [x] (97755) Provided verbal/tactile cueing for activities related to strengthening, flexibility, endurance, ROM  for improvements in scapular, scapulothoracic and UE control with self care, reaching, carrying, lifting, house/yardwork, driving/computer work.    [] (13051) Provided verbal/tactile cueing for activities related to improving balance, coordination, kinesthetic sense, posture, motor skill, proprioception  to assist with  scapular, scapulothoracic and UE control with self care, reaching, carrying, lifting, house/yardwork, driving/computer work.  [] (56091) Therapist is in constant attendance of 2 or more patients providing skilled therapy interventions, but not providing any significant amount of measurable one-on-one time to either patient, for improvements in cervical, scapular, scapulothoracic and UE control with self care, reaching, carrying, lifting, house/yardwork, driving, computer work.      Therapeutic Activities:    [] (34052 or 40021) Provided verbal/tactile cueing for activities related to improving balance, coordination, kinesthetic sense, posture, motor skill, weeks  1. Independent in HEP and progression per patient tolerance, in order to prevent re-injury. [] Progressing: [] Met: [] Not Met: [] Adjusted  2. Patient will have a decrease in pain to facilitate improvement in movement, function, and ADLs as indicated by Functional Deficits. [] Progressing: [] Met: [] Not Met: [] Adjusted    Long Term Goals: To be achieved in: 7 weeks  1. FOTO score of at least 68 to assist with reaching prior level of function. [] Progressing: [] Met: [] Not Met: [] Adjusted  2. Patient will demonstrate an increase in Strength to 5/5 to allow for proper functional mobility as indicated by patients Functional Deficits. [] Progressing: [] Met: [] Not Met: [] Adjusted  3. Patient will return to functional activities including work related tasks and sleeping without increased symptoms or restriction. [] Progressing: [] Met: [] Not Met: [] Adjusted  4. Patient will demonstrate good postural awareness to enable good shoulder mobility and reduction in impingement. [] Progressing: [] Met: [] Not Met: [] Adjusted    Overall Progression Towards Functional goals/ Treatment Progress Update:  [] Patient is progressing as expected towards functional goals listed. [] Progression is slowed due to complexities/Impairments listed. [] Progression has been slowed due to co-morbidities.   [x] Plan just implemented, too soon to assess goals progression <30days   [] Goals require adjustment due to lack of progress  [] Patient is not progressing as expected and requires additional follow up with physician  [] Other    Persisting Functional Limitations/Impairments:  []Sitting []Standing   []Transfers  [x]Sleeping   [x]Reaching [x]Lifting   [x]ADLs []Housework  []Driving [x]Job related tasks  []Sports/Recreation []Other:    ASSESSMENT:  See eval    Treatment/Activity Tolerance:  [x] Pt able to complete treatment [] Patient limited by fatique  [] Patient limited by pain  [] Patient limited by other medical complications  [] Other:     Prognosis:  [x] Good [] Fair  [] Poor    Patient Requires Follow-up: [x] Yes  [] No    Return to Play:    [x]  N/A     []  Stage 1: Intro to Strength   []  Stage 2: Dynamic Strength and Intro to Plyometrics   []  Stage 3: Advanced Plyometrics and Intro to Throwing   []  Stage 4: Sport specific Training/Return to Sport     []  Ready to Return to Play, [employer]   []  Not Ready for Return to Sports   Comments:      PLAN: See eval. PT 2x / week for 7 weeks. [] Continue per plan of care [] Alter current plan (see comments)  [x] Plan of care initiated [] Hold pending MD visit [] Discharge    Electronically signed by: Akosua Britt PT, DPT      Note: If patient does not return for scheduled/ recommended follow up visits, this note will serve as a discharge from care along with most recent update on progress.
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
## 2022-07-07 ENCOUNTER — HOSPITAL ENCOUNTER (OUTPATIENT)
Dept: PHYSICAL THERAPY | Age: 62
Setting detail: THERAPIES SERIES
Discharge: HOME | End: 2022-07-07
Payer: COMMERCIAL

## 2022-07-07 NOTE — PROGRESS NOTES
Physical Therapy    [place] [employer] [facility]    Physical Therapy  Cancellation/No-show Note  Patient Name:  Saad Butler  :  1960   Date:  2022  Cancelled visits to date: 0  No-shows to date:     For today's appointment patient:  []  Cancelled  []  Rescheduled appointment  [x]  No-show     Reason given by patient:  []  Patient ill  []  Conflicting appointment  []  No transportation    []  Conflict with work  []  No reason given  []  Other:     Comments:      Phone call information:   []  Phone call made today to patient at _ time at number provided:      []  Patient answered, conversation as follows:    []  Patient did not answer, message left as follows:  [x]  Phone call not made today  []  Phone call not needed - pt contacted us to cancel and provided reason for cancellation.      Electronically signed by:  Margarita Jennings, [id number]

## 2022-07-12 ENCOUNTER — HOSPITAL ENCOUNTER (OUTPATIENT)
Dept: PHYSICAL THERAPY | Age: 62
Setting detail: THERAPIES SERIES
Discharge: HOME | End: 2022-07-12
Payer: COMMERCIAL

## 2022-07-14 ENCOUNTER — HOSPITAL ENCOUNTER (OUTPATIENT)
Dept: PHYSICAL THERAPY | Age: 62
Setting detail: THERAPIES SERIES
Discharge: HOME | End: 2022-07-14
Payer: COMMERCIAL

## 2022-07-14 NOTE — FLOWSHEET NOTE
Physical Therapy    [facility],  Physical Therapy  Cancellation/No-show Note    Patient Name:  Vijay Clark  :  1960   Date:  2022  Cancelled visits to date: 0  No-shows to date: 3       For today's appointment patient:  []  Cancelled  []  Rescheduled appointment  [x]  No-show , ,      Reason given by patient:  []  Patient ill  []  Conflicting appointment  []  No transportation    []  Conflict with work  []  No reason given  []  Other:     Comments:      Phone call information:   [x]  Phone call made today to patient at 5 pm via  phone at number provided:      [x]  Patient's daughter answered, conversation as follows: pt is going much better, plans on coming to next apt on  at 4:30pm    []  Patient did not answer, message left as follows:  []  Phone call not made today due to language barrier ([place])  []  Phone call not needed - pt contacted us to cancel and provided reason for cancellation.      Electronically signed by:  Delilah Dawson, PT, DPT

## 2022-07-19 ENCOUNTER — HOSPITAL ENCOUNTER (OUTPATIENT)
Dept: PHYSICAL THERAPY | Age: 62
Setting detail: THERAPIES SERIES
Discharge: HOME | End: 2022-07-19
Payer: COMMERCIAL

## 2022-07-19 NOTE — FLOWSHEET NOTE
Physical Therapy    [facility],  Physical Therapy  Cancellation/No-show Note    Patient Name:  Juan Otto  :  1960   Date:  2022  Cancelled visits to date: 0  No-shows to date: 4       For today's appointment patient:  []  Cancelled  []  Rescheduled appointment  [x]  No-show , , ,      Reason given by patient:  []  Patient ill  []  Conflicting appointment  []  No transportation    []  Conflict with work  []  No reason given  []  Other:     Comments:      Phone call information:   []  Phone call made   to patient at 5 pm via  phone at number provided:      [x]  Patient's daughter answered, conversation as follows: pt is going much better, plans on coming to next apt on  at 4:30pm    []  Patient did not answer, message left as follows:  []  Phone call not made today due to language barrier (hemalatha)  []  Phone call not needed - pt contacted us to cancel and provided reason for cancellation.      Electronically signed by:  Mandy Khan [place], [id number]

## 2022-07-21 ENCOUNTER — HOSPITAL ENCOUNTER (OUTPATIENT)
Dept: PHYSICAL THERAPY | Age: 62
Setting detail: THERAPIES SERIES
Discharge: HOME | End: 2022-07-21
Payer: COMMERCIAL

## 2022-07-21 PROCEDURE — 97140 MANUAL THERAPY 1/> REGIONS: CPT

## 2022-07-21 PROCEDURE — 97110 THERAPEUTIC EXERCISES: CPT

## 2022-07-21 NOTE — FLOWSHEET NOTE
[address] [facility]  Phone: [phone]   Fax: [phone]    Physical Therapy Treatment Note/ Progress Report:     Date:  2022    Patient Name:  Bradley Dumont    :  1960  MRN: [id number]  Restrictions/Precautions:    Medical/Treatment Diagnosis Information:  Diagnosis: M25.512 (ICD-10-CM) - Pain in left shoulder  Treatment Diagnosis: L shoulder pain, hypomobility L GHJ, dec L shoulder strength, impaired posture  Insurance/Certification information:  PT Insurance Information: [place]  Physician Information:  Cierra Jacques NP    Plan of care signed (Y/N): []  Yes []  No     Date of Patient follow up with Physician:      Progress Report: []  Yes  [x]  No     Date Range for reporting period:  Beginnin/30  Ending:     Progress report due (10 Rx/or 30 days whichever is less): visit #10 or  (date)     Recertification due (POC duration/ or 90 days whichever is less): visit #14 or  (date)     Visit # Insurance Allowable Auth required? Date Range   2   0 used as of 22 []  Yes  [x]  No        Latex Allergy:  [x]NO      []YES  Preferred Language for Healthcare:   []English       [x]other: Welsh    Functional Scale:           Date assessed:  [facility] physical FS primary measure score = 50; risk adjusted = 50  22     Pain level:  3-4/10     SUBJECTIVE:  Pt reports his shoulder is feeling much better than eval. Denies numbness and tingling. Has some pain to touch at top of shoulder. Was having difficulty with at home.      OBJECTIVE:   Observation:   : pt's family member present, used  ipad   Test measurements:      RESTRICTIONS/PRECAUTIONS: HTN, DM    Exercises/Interventions:   Therapeutic Exercise (94049) Resistance / level Sets / Seconds  Reps Notes/Cues   UBE  3 min forward  2.5 min retro      Doorway pec stretch    IR stretch with SOS  30 sec     30 sec X3    X3    Wall slide with focus on lat stretch       SB wall walks: flexion with thorac ext  - rainbows (IR/ER) Green   Green   1  1 X10  x10    TB:  - LPD  - High rows  - Rows  - IR  - ER   Blue  Blue    Blue  Blue    1  1    1  1   X15  X15    X15 L  X15 L           Prone: I, Y, T's                            HEP review  Per HEP Per HEP 15'   Therapeutic Activities (83310)                                          Neuromuscular Re-ed (79978)       Wall push ups       Ball on wall       Plank weight shifts                     Manual Intervention ([phone])       Shld /GH Mobs: inf and post  6/30: pt reported dec pain following tx   PROM all directions with over pressure     Sidelying scapular mobs  MWM into GHJ abd   X 3 min      X 5 min  X 2 min                                     Modalities:     Pt. Education:  -pt educated on diagnosis, prognosis and expectations for rehab  -all pt questions were answered    Home Exercise Program:  Access Code: [email or web address]  URL: [email or web address]  Date: 07/21/2022  Prepared by: Drew Heart    Exercises  Standing High Row with Anchored Resistance - 1 x daily - 7 x weekly - 3 sets - 10 reps  Shoulder Extension with Resistance - 1 x daily - 7 x weekly - 3 sets - 10 reps  Shoulder Internal Rotation with Resistance - 1 x daily - 7 x weekly - 3 sets - 10 reps  Shoulder External Rotation with Anchored Resistance - 1 x daily - 7 x weekly - 3 sets - 10 reps  Shoulder Flexion Wall Slide with Towel - 1 x daily - 7 x weekly - 3 sets - 10 reps  Single Arm Doorway Pec Stretch at 90 Degrees Abduction - 1 x daily - 7 x weekly - 3 sets - 10 reps      Access Code: [email or web address]  URL: [email or web address]  Date: 06/30/2022  Prepared by: Kirti Juarez Huml    Exercises  Scaption Wall Slide with Towel (Mirrored) - 1 x daily - 7 x weekly - 2 sets - 10 reps  Standing Shoulder Posterior Capsule Stretch - 1 x daily - 7 x weekly - 1 sets - 10 reps - 10 hold  Doorway Pec Stretch at 90 Degrees Abduction - 1 x daily - 7 x weekly - 1 sets - 10 reps - 10 Provided manual therapy to mobilize soft tissue/joints of cervical/CT, scapular GHJ and UE for the purpose of modulating pain, promoting relaxation,  increasing ROM, reducing/eliminating soft tissue swelling/inflammation/restriction, improving soft tissue extensibility and allowing for proper ROM for normal function with self care, reaching, carrying, lifting, house/yardwork, driving/computer work      Charges:  Timed Code Treatment Minutes: 45   Total Treatment Minutes: 45       [] EVAL - LOW (98896)   [] EVAL - MOD (83456)  [] EVAL - HIGH (91264)  [] RE-EVAL (29283)  [x] JR(06559) x  2     [] Ionto  [] NMR (97067) x       [] Vaso  [x] Manual (07162) x  1     [] Ultrasound  [] TA x        [] Mech Traction (54388)  [] Aquatic Therapy x     [] ES (un) (84645):   [] Home Management Training x  [] ES(attended) (87760)   [] Dry Needling 1-2 muscles (53081):  [] Dry Needling 3+ muscles (718133  [] Group:      [] Other:                    GOALS:  Patient stated goal: get shoulder better to return to work  [] Progressing: [] Met: [] Not Met: [] Adjusted    Therapist goals for Patient:   Short Term Goals: To be achieved in: 2 weeks  1. Independent in HEP and progression per patient tolerance, in order to prevent re-injury. [] Progressing: [] Met: [] Not Met: [] Adjusted  2. Patient will have a decrease in pain to facilitate improvement in movement, function, and ADLs as indicated by Functional Deficits. [] Progressing: [] Met: [] Not Met: [] Adjusted    Long Term Goals: To be achieved in: 7 weeks  1. FOTO score of at least 68 to assist with reaching prior level of function. [] Progressing: [] Met: [] Not Met: [] Adjusted  2. Patient will demonstrate an increase in Strength to 5/5 to allow for proper functional mobility as indicated by patients Functional Deficits. [] Progressing: [] Met: [] Not Met: [] Adjusted  3.  Patient will return to functional activities including work related tasks and sleeping without increased symptoms or restriction. [] Progressing: [] Met: [] Not Met: [] Adjusted  4. Patient will demonstrate good postural awareness to enable good shoulder mobility and reduction in impingement. [] Progressing: [] Met: [] Not Met: [] Adjusted    Overall Progression Towards Functional goals/ Treatment Progress Update:  [] Patient is progressing as expected towards functional goals listed. [] Progression is slowed due to complexities/Impairments listed. [] Progression has been slowed due to co-morbidities. [x] Plan just implemented, too soon to assess goals progression <30days   [] Goals require adjustment due to lack of progress  [] Patient is not progressing as expected and requires additional follow up with physician  [] Other    Persisting Functional Limitations/Impairments:  []Sitting []Standing   []Transfers  [x]Sleeping   [x]Reaching [x]Lifting   [x]ADLs []Housework  []Driving [x]Job related tasks  []Sports/Recreation []Other:    ASSESSMENT:  Point tender at rhomboids on L. Poor body mechanics until cued - tends to shrug shoulders and ext thor spine instead of rest in neutral. Not limited with PROM. No increase in pain with therex. Overall patient reports doing much better, but does want to come for one more visit in one week. Treatment/Activity Tolerance:  [x] Pt able to complete treatment [] Patient limited by fatique  [] Patient limited by pain  [] Patient limited by other medical complications  [] Other:     Prognosis:  [x] Good [] Fair  [] Poor    Patient Requires Follow-up: [x] Yes  [] No    Return to Play:    [x]  N/A     []  Stage 1: Intro to Strength   []  Stage 2: Dynamic Strength and Intro to Plyometrics   []  Stage 3: Advanced Plyometrics and Intro to Throwing   []  Stage 4: Sport specific Training/Return to Sport     []  Ready to Return to Play, [employer]   []  Not Ready for Return to Sports   Comments:      PLAN: See eval. PT 2x / week for 7 weeks.   [x] Continue per plan of

## 2022-07-26 ENCOUNTER — APPOINTMENT (OUTPATIENT)
Dept: PHYSICAL THERAPY | Age: 62
End: 2022-07-26
Payer: COMMERCIAL

## 2022-07-28 ENCOUNTER — HOSPITAL ENCOUNTER (OUTPATIENT)
Dept: PHYSICAL THERAPY | Age: 62
Setting detail: THERAPIES SERIES
Discharge: HOME | End: 2022-07-28
Payer: COMMERCIAL

## 2022-07-28 NOTE — PROGRESS NOTES
Physical Therapy    [facility],  Physical Therapy  Cancellation/No-show Note    Patient Name:  Evelia Egan  :  1960   Date:  2022  Cancelled visits to date: 0  No-shows to date: 3       For today's appointment patient:  [x]  Cancelled     []  Rescheduled appointment  [x]  No-show , , ,      Reason given by patient:  []  Patient ill  []  Conflicting appointment  []  No transportation    []  Conflict with work  []  No reason given  []  Other:     Comments:      Phone call information:   []  Phone call made  to patient at 3pm pm via  phone at number provided:      [x]  Patient's daughter answered, conversation as follows called to determine if patient was coming in to therapy today. RS appt to    []  Patient did not answer, message left as follows:  []  Phone call not made today due to language barrier ([place]alatha)  []  Phone call not needed - pt contacted us to cancel and provided reason for cancellation.         Interpretor # [id number] Felicia    Electronically signed by:  Shruti Tom, [address], [id number]

## 2022-08-02 ENCOUNTER — HOSPITAL ENCOUNTER (OUTPATIENT)
Dept: PHYSICAL THERAPY | Age: 62
Setting detail: THERAPIES SERIES
Discharge: HOME | End: 2022-08-02
Payer: COMMERCIAL

## 2022-08-02 PROCEDURE — 97110 THERAPEUTIC EXERCISES: CPT

## 2022-08-02 PROCEDURE — 97140 MANUAL THERAPY 1/> REGIONS: CPT

## 2022-08-02 NOTE — FLOWSHEET NOTE
[address] [place] [address] [facility]  Phone: [phone]   Fax: [phone]    Physical Therapy Treatment Note/ Progress Report:     Date:  2022    Patient Name:  Angelito Harkins    :  1960  MRN: [id number]  Restrictions/Precautions:    Medical/Treatment Diagnosis Information:  Diagnosis: M25.512 (ICD-10-CM) - Pain in left shoulder  Treatment Diagnosis: L shoulder pain, hypomobility L GHJ, dec L shoulder strength, impaired posture  Insurance/Certification information:  PT Insurance Information: [place]  Physician Information:  Asaf Crane NP    Plan of care signed (Y/N): []  Yes []  No     Date of Patient follow up with Physician:      Progress Report: []  Yes  [x]  No     Date Range for reporting period:  Beginnin/30  Ending:     Progress report due (10 Rx/or 30 days whichever is less): visit #10 or  (date)     Recertification due (POC duration/ or 90 days whichever is less): visit #14 or  (date)     Visit # Insurance Allowable Auth required? Date Range   3 /  0 used as of 22 []  Yes  [x]  No        Latex Allergy:  [x]NO      []YES  Preferred Language for Healthcare:   []English       [x]other: Occitan    Functional Scale:           Date assessed:  [facility] physical FS primary measure score = 50; risk adjusted = 50  22     Pain level:  4-5/10     SUBJECTIVE:  Pt reports he is still having tingling in L hand. Pain is located at tops of L shoulder (pointing to [facility] joint). Has been doing HEP.      OBJECTIVE:   Observation:   , : pt's family member present, used  ipad   Test measurements:      RESTRICTIONS/PRECAUTIONS: HTN, DM    Exercises/Interventions:   Therapeutic Exercise (83549) Resistance / level Sets / Seconds  Reps Notes/Cues   UBE  3 min forward  3 min retro      Doorway pec stretch    IR stretch with SOS  30 sec     30 sec X3    X3    Wall slide with focus on lat stretch       SB wall walks: flexion with thorac ext  - rainbows (IR/ER)    TB:  - LPD  - High rows  - Rows  - IR  - ER    Cables:  D2 ext   Adduction   ER  IR   10#  10#  10#  10#   2  2  2  2   X10 L  X10 L   X10 L  X10 L     Prone: I, Y, T's No weight  1 X10 B all directions                          HEP review  13'   Therapeutic Activities (55473)                                          Neuromuscular Re-ed (33617)       Wall push ups       Ball on wall       Plank weight shifts                     Manual Intervention (41308)       Shld /GH Mobs: inf and post  6/30: pt reported dec pain following tx   PROM all directions with over pressure   Inferior and posterior joint mobs grade 2   Sidelying scapular mobs  MWM into GHJ abd   X 5 min    X5 min  X 3 min  X 2 min                                     Modalities:     Pt. Education:  -pt educated on diagnosis, prognosis and expectations for rehab  -all pt questions were answered    Home Exercise Program:  Access Code: [email or web address]  URL: [email or web address]  Date: 07/21/2022  Prepared by: Lydia Rubio    Exercises  Standing High Row with Anchored Resistance - 1 x daily - 7 x weekly - 3 sets - 10 reps  Shoulder Extension with Resistance - 1 x daily - 7 x weekly - 3 sets - 10 reps  Shoulder Internal Rotation with Resistance - 1 x daily - 7 x weekly - 3 sets - 10 reps  Shoulder External Rotation with Anchored Resistance - 1 x daily - 7 x weekly - 3 sets - 10 reps  Shoulder Flexion Wall Slide with Towel - 1 x daily - 7 x weekly - 3 sets - 10 reps  Single Arm Doorway Pec Stretch at 90 Degrees Abduction - 1 x daily - 7 x weekly - 3 sets - 10 reps      Access Code: [email or web address]  URL: [employer]/  Date: 06/30/2022  Prepared by: Justen Abernathy Huml    Exercises  Scaption Wall Slide with Towel (Mirrored) - 1 x daily - 7 x weekly - 2 sets - 10 reps  Standing Shoulder Posterior Capsule Stretch - 1 x daily - 7 x weekly - 1 sets - 10 reps - 10 hold  Doorway Pec Stretch at 90 Degrees Abduction - 1 x daily - 7 x weekly - 1 sets - 10 reps - 10 hold      Therapeutic Exercise and NMR EXR  [x] (39660) Provided verbal/tactile cueing for activities related to strengthening, flexibility, endurance, ROM  for improvements in scapular, scapulothoracic and UE control with self care, reaching, carrying, lifting, house/yardwork, driving/computer work.    [] (44940) Provided verbal/tactile cueing for activities related to improving balance, coordination, kinesthetic sense, posture, motor skill, proprioception  to assist with  scapular, scapulothoracic and UE control with self care, reaching, carrying, lifting, house/yardwork, driving/computer work.  [] (69968) Therapist is in constant attendance of 2 or more patients providing skilled therapy interventions, but not providing any significant amount of measurable one-on-one time to either patient, for improvements in cervical, scapular, scapulothoracic and UE control with self care, reaching, carrying, lifting, house/yardwork, driving, computer work. Therapeutic Activities:    [] (70429 or 75762) Provided verbal/tactile cueing for activities related to improving balance, coordination, kinesthetic sense, posture, motor skill, proprioception and motor activation to allow for proper function of scapular, scapulothoracic and UE control with self care, carrying, lifting, driving/computer work.      Home Exercise Program:    [] (04794) Reviewed/Progressed HEP activities related to strengthening, flexibility, endurance, ROM of scapular, scapulothoracic and UE control with self care, reaching, carrying, lifting, house/yardwork, driving/computer work  [] (90372) Reviewed/Progressed HEP activities related to improving balance, coordination, kinesthetic sense, posture, motor skill, proprioception of scapular, scapulothoracic and UE control with self care, reaching, carrying, lifting, house/yardwork, driving/computer work      Manual Treatments:  PROM / STM / Oscillations-Mobs:  G-I, II, III, IV (PA's, Inf., Post.)  [x] (00559) Provided manual therapy to mobilize soft tissue/joints of cervical/CT, scapular GHJ and UE for the purpose of modulating pain, promoting relaxation,  increasing ROM, reducing/eliminating soft tissue swelling/inflammation/restriction, improving soft tissue extensibility and allowing for proper ROM for normal function with self care, reaching, carrying, lifting, house/yardwork, driving/computer work      Charges:  Timed Code Treatment Minutes: 43   Total Treatment Minutes: 43       [] EVAL - LOW (02970)   [] EVAL - MOD (97395)  [] EVAL - HIGH (78277)  [] RE-EVAL (85713)  [x] TN(60287) x  2     [] Ionto  [] NMR (68691) x       [] Vaso  [x] Manual (25392) x  1     [] Ultrasound  [] TA x        [] Mech Traction (91695)  [] Aquatic Therapy x     [] ES (un) (43823):   [] Home Management Training x  [] ES(attended) (07465)   [] Dry Needling 1-2 muscles (61773):  [] Dry Needling 3+ muscles (313601  [] Group:      [] Other:                    GOALS:  Patient stated goal: get shoulder better to return to work  [] Progressing: [] Met: [] Not Met: [] Adjusted    Therapist goals for Patient:   Short Term Goals: To be achieved in: 2 weeks  1. Independent in HEP and progression per patient tolerance, in order to prevent re-injury. [] Progressing: [] Met: [] Not Met: [] Adjusted  2. Patient will have a decrease in pain to facilitate improvement in movement, function, and ADLs as indicated by Functional Deficits. [] Progressing: [] Met: [] Not Met: [] Adjusted    Long Term Goals: To be achieved in: 7 weeks  1. FOTO score of at least 68 to assist with reaching prior level of function. [] Progressing: [] Met: [] Not Met: [] Adjusted  2. Patient will demonstrate an increase in Strength to 5/5 to allow for proper functional mobility as indicated by patients Functional Deficits. [] Progressing: [] Met: [] Not Met: [] Adjusted  3.  Patient will return to functional activities including work related tasks and sleeping without increased symptoms or restriction. [] Progressing: [] Met: [] Not Met: [] Adjusted  4. Patient will demonstrate good postural awareness to enable good shoulder mobility and reduction in impingement. [] Progressing: [] Met: [] Not Met: [] Adjusted    Overall Progression Towards Functional goals/ Treatment Progress Update:  [] Patient is progressing as expected towards functional goals listed. [] Progression is slowed due to complexities/Impairments listed. [] Progression has been slowed due to co-morbidities. [x] Plan just implemented, too soon to assess goals progression <30days   [] Goals require adjustment due to lack of progress  [] Patient is not progressing as expected and requires additional follow up with physician  [] Other    Persisting Functional Limitations/Impairments:  []Sitting []Standing   []Transfers  [x]Sleeping   [x]Reaching [x]Lifting   [x]ADLs []Housework  []Driving [x]Job related tasks  []Sports/Recreation []Other:    ASSESSMENT:  Pt progressing welll - only area of pain is AC joint. Improved pain with session. 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 Overall patient reports doing much better, will schedule one or two more visits to ensure return to PLOF. Treatment/Activity Tolerance:  [x] Pt able to complete treatment [] Patient limited by fatique  [] Patient limited by pain  [] Patient limited by other medical complications  [] Other:     Prognosis:  [x] Good [] Fair  [] Poor    Patient Requires Follow-up: [x] Yes  [] No    Return to Play:    [x]  N/A     []  Stage 1: Intro to Strength   []  Stage 2: Dynamic Strength and Intro to Plyometrics   []  Stage 3: Advanced Plyometrics and Intro to Throwing   []  Stage 4: Sport specific Training/Return to Sport     []  Ready to Return to Play, [employer]   []  Not Ready for Return to Sports   Comments:      PLAN: See eval. PT 2x / week for 7 weeks.   [x] Continue per plan of care [] Alter current plan (see comments)  [] Plan of care initiated [] Hold pending MD visit [] Discharge    Electronically signed by: Delilah Dawson PT, DPT      Note: If patient does not return for scheduled/ recommended follow up visits, this note will serve as a discharge from care along with most recent update on progress.

## 2022-08-23 ENCOUNTER — HOSPITAL ENCOUNTER (OUTPATIENT)
Dept: PHYSICAL THERAPY | Age: 62
Setting detail: THERAPIES SERIES
Discharge: HOME | End: 2022-08-23
Payer: COMMERCIAL

## 2022-08-23 PROCEDURE — 97140 MANUAL THERAPY 1/> REGIONS: CPT

## 2022-08-23 PROCEDURE — 97110 THERAPEUTIC EXERCISES: CPT

## 2022-08-23 NOTE — FLOWSHEET NOTE
[address] [facility]  Phone: [phone]   Fax: [phone]    Physical Therapy Treatment Note/ Progress Report:     Date:  2022    Patient Name:  Scotty Concepcion    :  1960  MRN: [id number]  Restrictions/Precautions:    Medical/Treatment Diagnosis Information:  Diagnosis: M25.512 (ICD-10-CM) - Pain in left shoulder  Treatment Diagnosis: L shoulder pain, hypomobility L GHJ, dec L shoulder strength, impaired posture  Insurance/Certification information:  PT Insurance Information: [place]  Physician Information:  Memo Yarbrough NP    Plan of care signed (Y/N): []  Yes []  No     Date of Patient follow up with Physician:      Progress Report: []  Yes  [x]  No     Date Range for reporting period:  Beginnin/30  Ending:     Progress report due (10 Rx/or 30 days whichever is less): visit #10 or  (date)     Recertification due (POC duration/ or 90 days whichever is less): visit #14 or  (date)     Visit # Insurance Allowable Auth required? Date Range   4 30/  0 used as of 22 []  Yes  [x]  No        Latex Allergy:  [x]NO      []YES  Preferred Language for Healthcare:   []English       [x]other: Czech    Functional Scale:           Date assessed:  [facility] physical FS primary measure score = 50; risk adjusted = 50  22     Pain level:  3.5/10     SUBJECTIVE:  Pt reports pain is a lot better. Still has a little bit of pain, but it is much better since initial eval. Will be ready for d/c at [facility].     OBJECTIVE:   Observation:   , : pt's family member present, used  ipad   Test measurements:      RESTRICTIONS/PRECAUTIONS: HTN, DM    Exercises/Interventions:   Therapeutic Exercise (20263) Resistance / level Sets / Seconds  Reps Notes/Cues   UBE  3 min forward  3 min retro      Doorway pec stretch    IR stretch with SOS  30 sec     30 sec X3    X3    Wall slide with focus on lat stretch       SB wall walks: flexion with thorac ext  - rainbows (IR/ER)    TB:  - LPD  - High rows  - Rows  - IR  - ER    Cables:  D2 ext   Adduction   ER  IR   10#  10#  10#  10#   2  2  2  2   X10 L  X10 L   X10 L  X10 L     Prone: I, Y, T's No weight  1 X10 B all directions                          HEP review  13'   Therapeutic Activities (65010)                                          Neuromuscular Re-ed (65492)       Wall push ups       Ball on wall       Plank weight shifts                     Manual Intervention (66865)       Shld /GH Mobs: inf and post  6/30: pt reported dec pain following tx   PROM all directions with over pressure   Inferior and posterior joint mobs grade 2   Sidelying scapular mobs  MWM into GHJ abd   X 5 min  X 5 min  X 2 min                                     Modalities:     Pt. Education:  -pt educated on diagnosis, prognosis and expectations for rehab  -all pt questions were answered    Home Exercise Program:  Access Code: [email or web address]  URL: [email or web address]  Date: 07/21/2022  Prepared by: Chelsy Larson    Exercises  Standing High Row with Anchored Resistance - 1 x daily - 7 x weekly - 3 sets - 10 reps  Shoulder Extension with Resistance - 1 x daily - 7 x weekly - 3 sets - 10 reps  Shoulder Internal Rotation with Resistance - 1 x daily - 7 x weekly - 3 sets - 10 reps  Shoulder External Rotation with Anchored Resistance - 1 x daily - 7 x weekly - 3 sets - 10 reps  Shoulder Flexion Wall Slide with Towel - 1 x daily - 7 x weekly - 3 sets - 10 reps  Single Arm Doorway Pec Stretch at 90 Degrees Abduction - 1 x daily - 7 x weekly - 3 sets - 10 reps      Access Code: [email or web address]  URL: [email or web address]  Date: 06/30/2022  Prepared by: Darylene Homes Humjennifer    Exercises  Scaption Wall Slide with Towel (Mirrored) - 1 x daily - 7 x weekly - 2 sets - 10 reps  Standing Shoulder Posterior Capsule Stretch - 1 x daily - 7 x weekly - 1 sets - 10 reps - 10 hold  Doorway Pec Stretch at 90 Degrees Abduction - 1 x daily - 7 x weekly - 1 sets - 10 reps - 10 hold      Therapeutic Exercise and NMR EXR  [x] (23244) Provided verbal/tactile cueing for activities related to strengthening, flexibility, endurance, ROM  for improvements in scapular, scapulothoracic and UE control with self care, reaching, carrying, lifting, house/yardwork, driving/computer work.    [] (32492) Provided verbal/tactile cueing for activities related to improving balance, coordination, kinesthetic sense, posture, motor skill, proprioception  to assist with  scapular, scapulothoracic and UE control with self care, reaching, carrying, lifting, house/yardwork, driving/computer work.  [] (13294) Therapist is in constant attendance of 2 or more patients providing skilled therapy interventions, but not providing any significant amount of measurable one-on-one time to either patient, for improvements in cervical, scapular, scapulothoracic and UE control with self care, reaching, carrying, lifting, house/yardwork, driving, computer work. Therapeutic Activities:    [] (79431 or 37115) Provided verbal/tactile cueing for activities related to improving balance, coordination, kinesthetic sense, posture, motor skill, proprioception and motor activation to allow for proper function of scapular, scapulothoracic and UE control with self care, carrying, lifting, driving/computer work.      Home Exercise Program:    [] (75154) Reviewed/Progressed HEP activities related to strengthening, flexibility, endurance, ROM of scapular, scapulothoracic and UE control with self care, reaching, carrying, lifting, house/yardwork, driving/computer work  [] (89952) Reviewed/Progressed HEP activities related to improving balance, coordination, kinesthetic sense, posture, motor skill, proprioception of scapular, scapulothoracic and UE control with self care, reaching, carrying, lifting, house/yardwork, driving/computer work      Manual Treatments:  PROM / STM / Oscillations-Mobs:  G-I, II, III, IV (PA's, Inf., Post.)  [x] (24179) Provided manual therapy to mobilize soft tissue/joints of cervical/CT, scapular GHJ and UE for the purpose of modulating pain, promoting relaxation,  increasing ROM, reducing/eliminating soft tissue swelling/inflammation/restriction, improving soft tissue extensibility and allowing for proper ROM for normal function with self care, reaching, carrying, lifting, house/yardwork, driving/computer work      Charges:  Timed Code Treatment Minutes: 40   Total Treatment Minutes: 40       [] EVAL - LOW (45974)   [] EVAL - MOD (93864)  [] EVAL - HIGH (58227)  [] RE-EVAL (97611)  [x] PH(44937) x  2     [] Ionto  [] NMR (73108) x       [] Vaso  [x] Manual (73956) x  1     [] Ultrasound  [] TA x        [] Mech Traction (28503)  [] Aquatic Therapy x     [] ES (un) (20449):   [] Home Management Training x  [] ES(attended) (46145)   [] Dry Needling 1-2 muscles (94270):  [] Dry Needling 3+ muscles (740910  [] Group:      [] Other:                    GOALS:  Patient stated goal: get shoulder better to return to work  [] Progressing: [] Met: [] Not Met: [] Adjusted    Therapist goals for Patient:   Short Term Goals: To be achieved in: 2 weeks  1. Independent in HEP and progression per patient tolerance, in order to prevent re-injury. [] Progressing: [] Met: [] Not Met: [] Adjusted  2. Patient will have a decrease in pain to facilitate improvement in movement, function, and ADLs as indicated by Functional Deficits. [] Progressing: [] Met: [] Not Met: [] Adjusted    Long Term Goals: To be achieved in: 7 weeks  1. FOTO score of at least 68 to assist with reaching prior level of function. [] Progressing: [] Met: [] Not Met: [] Adjusted  2. Patient will demonstrate an increase in Strength to 5/5 to allow for proper functional mobility as indicated by patients Functional Deficits. [] Progressing: [] Met: [] Not Met: [] Adjusted  3.  Patient will return to functional activities including work related tasks and sleeping without increased symptoms or restriction. [] Progressing: [] Met: [] Not Met: [] Adjusted  4. Patient will demonstrate good postural awareness to enable good shoulder mobility and reduction in impingement. [] Progressing: [] Met: [] Not Met: [] Adjusted    Overall Progression Towards Functional goals/ Treatment Progress Update:  [] Patient is progressing as expected towards functional goals listed. [] Progression is slowed due to complexities/Impairments listed. [] Progression has been slowed due to co-morbidities. [x] Plan just implemented, too soon to assess goals progression <30days   [] Goals require adjustment due to lack of progress  [] Patient is not progressing as expected and requires additional follow up with physician  [] Other    Persisting Functional Limitations/Impairments:  []Sitting []Standing   []Transfers  [x]Sleeping   [x]Reaching [x]Lifting   [x]ADLs []Housework  []Driving [x]Job related tasks  []Sports/Recreation []Other:    ASSESSMENT:  Pt responding very well to above interventions. Pt demonstrating normal ER and IR ROM, mild restriction at [facility] into flexion and abd with pain noted at [address] [place] [address]. Will plan to d/c at [facility] as long as pt continues to respond well. Treatment/Activity Tolerance:  [x] Pt able to complete treatment [] Patient limited by fatique  [] Patient limited by pain  [] Patient limited by other medical complications  [] Other:     Prognosis:  [x] Good [] Fair  [] Poor    Patient Requires Follow-up: [x] Yes  [] No    Return to Play:    [x]  N/A     []  Stage 1: Intro to Strength   []  Stage 2: Dynamic Strength and Intro to Plyometrics   []  Stage 3: Advanced Plyometrics and Intro to Throwing   []  Stage 4: Sport specific Training/Return to Sport     []  Ready to Return to Play, [employer]   []  Not Ready for Return to Sports   Comments:      PLAN: See eval. PT 2x / week for 7 weeks.   [x] Continue per plan of care [] Alter current plan (see comments)  [] Plan of care initiated [] Hold pending MD visit [] Discharge    Electronically signed by: Kirti Méndez PT, DPT      Note: If patient does not return for scheduled/ recommended follow up visits, this note will serve as a discharge from care along with most recent update on progress.

## 2022-08-30 ENCOUNTER — HOSPITAL ENCOUNTER (OUTPATIENT)
Dept: PHYSICAL THERAPY | Age: 62
Setting detail: THERAPIES SERIES
Discharge: HOME | End: 2022-08-30
Payer: COMMERCIAL

## 2022-08-30 PROCEDURE — 97110 THERAPEUTIC EXERCISES: CPT

## 2022-08-30 PROCEDURE — 97140 MANUAL THERAPY 1/> REGIONS: CPT

## 2022-08-30 NOTE — PLAN OF CARE
[address]  Phone: [phone]   Fax: [phone]   Physical Therapy Discharge Summary    Dear Cierra Jacques NP  ,    We had the pleasure of treating the following patient for physical therapy services at [facility]. A summary of our findings can be found in the discharge summary below. If you have any questions or concerns regarding these findings, please do not hesitate to contact me at the office phone number checked above. Thank you for the referral.     Physician Signature:________________________________Date:__________________  By signing above (or electronic signature), therapists plan is approved by physician      Functional Outcome:     FOTO: not completed due to language barrier      Overall Response to Treatment:   []Patient is responding well to treatment and improvement is noted with regards  to goals   []Patient should continue to improve in reasonable time if they continue HEP   []Patient has plateaued and is no longer responding to skilled PT intervention    []Patient is getting worse and would benefit from return to referring MD   []Patient unable to adhere to initial POC   [x]Other: Pt has met all goals for therapy and is indep with HEP. Pt ready to d/c with pain well controlled over the past few visits. Date range of Visits: -22  Total Visits: 5    Recommendation:    [x] Discharge to HEP. Follow up with PT or MD PRN.            Physical Therapy Treatment Note/ Progress Report:     Date:  2022    Patient Name:  Bradley Dumont    :  1960  MRN: [id number]  Restrictions/Precautions:    Medical/Treatment Diagnosis Information:  Diagnosis: M25.512 (ICD-10-CM) - Pain in left shoulder  Treatment Diagnosis: L shoulder pain, hypomobility L GHJ, dec L shoulder strength, impaired posture  Insurance/Certification information:  PT Insurance Information: [place]  Physician Information:  Cierra Jacques NP    Plan of care signed (Y/N): []  Yes []  No     Date of Patient follow up with Physician:      Progress Report: []  Yes  [x]  No     Date Range for reporting period:  Beginnin/30  Ending:     Progress report due (10 Rx/or 30 days whichever is less): visit #10 or  (date)     Recertification due (POC duration/ or 90 days whichever is less): visit #14 or  (date)     Visit # Insurance Allowable Auth required? Date Range   5 30/  0 used as of 22 []  Yes  [x]  No        Latex Allergy:  [x]NO      []YES  Preferred Language for Healthcare:   []English       [x]other: Welsh    Functional Scale:           Date assessed:  [facility] physical FS primary measure score = 50; risk adjusted = 50  22     Pain level:  3.5/10     SUBJECTIVE:  Pt is ready for discharge today.      OBJECTIVE:   , : pt's family member present, used  ipad     :  Shoulder ER MMT: 5  L GHJ mobility = normal  Empty can (-)      RESTRICTIONS/PRECAUTIONS: HTN, DM    Exercises/Interventions:   Therapeutic Exercise (23852) Resistance / level Sets / Seconds  Reps Notes/Cues   UBE  3 min forward  3 min retro      Doorway pec stretch    IR stretch with SOS  30 sec     30 sec X3    X3    Wall slide with focus on lat stretch       SB wall walks: flexion with thorac ext  - rainbows (IR/ER)    TB:  - LPD  - High rows  - Rows  - IR  - ER    Cables:  D2 ext   Adduction   ER  IR   10#  10#  10#  10#   2  2  2  2   X10 L  X10 L   X10 L  X10 L     Prone: I, Y, T's No weight                               Therapeutic Activities (21469)                                          Neuromuscular Re-ed (00019)       Wall push ups       Ball on wall       Plank weight shifts                     Manual Intervention (68622)       Shld /GH Mobs: inf and post  : pt reported dec pain following tx   PROM all directions with over pressure   Inferior and posterior joint mobs grade 2   Sidelying scapular mobs  MWM patients providing skilled therapy interventions, but not providing any significant amount of measurable one-on-one time to either patient, for improvements in cervical, scapular, scapulothoracic and UE control with self care, reaching, carrying, lifting, house/yardwork, driving, computer work. Therapeutic Activities:    [] (22702 or 60224) Provided verbal/tactile cueing for activities related to improving balance, coordination, kinesthetic sense, posture, motor skill, proprioception and motor activation to allow for proper function of scapular, scapulothoracic and UE control with self care, carrying, lifting, driving/computer work.      Home Exercise Program:    [] (79258) Reviewed/Progressed HEP activities related to strengthening, flexibility, endurance, ROM of scapular, scapulothoracic and UE control with self care, reaching, carrying, lifting, house/yardwork, driving/computer work  [] (97310) Reviewed/Progressed HEP activities related to improving balance, coordination, kinesthetic sense, posture, motor skill, proprioception of scapular, scapulothoracic and UE control with self care, reaching, carrying, lifting, house/yardwork, driving/computer work      Manual Treatments:  PROM / STM / Oscillations-Mobs:  G-I, II, III, IV (PA's, Inf., Post.)  [x] (73366) Provided manual therapy to mobilize soft tissue/joints of cervical/CT, scapular GHJ and UE for the purpose of modulating pain, promoting relaxation,  increasing ROM, reducing/eliminating soft tissue swelling/inflammation/restriction, improving soft tissue extensibility and allowing for proper ROM for normal function with self care, reaching, carrying, lifting, house/yardwork, driving/computer work      Charges:  Timed Code Treatment Minutes: 38   Total Treatment Minutes: 38       [] EVAL - LOW (25069)   [] EVAL - MOD (18014)  [] EVAL - HIGH (30162)  [] RE-EVAL (12675)  [x] VD(24259) x  2     [] Ionto  [] NMR (70543) x       [] Vaso  [x] Manual (87220) x  1 [] Ultrasound  [] TA x        [] [facility] (65205[facility]  [] Aquatic Therapy x     [] ES (un) (26130):   [] Home Management Training x  [] ES(attended) (25054)   [] Dry Needling 1-2 muscles (79988):  [] Dry Needling 3+ muscles (560542  [] Group:      [] Other:                    GOALS:  Patient stated goal: get shoulder better to return to work  [] Progressing: [x] Met: [] Not Met: [] Adjusted    Therapist goals for Patient:   Short Term Goals: To be achieved in: 2 weeks  1. Independent in HEP and progression per patient tolerance, in order to prevent re-injury. [] Progressing: [x] Met: [] Not Met: [] Adjusted  2. Patient will have a decrease in pain to facilitate improvement in movement, function, and ADLs as indicated by Functional Deficits. [] Progressing: [x] Met: [] Not Met: [] Adjusted    Long Term Goals: To be achieved in: 7 weeks  1. FOTO score of at least 68 to assist with reaching prior level of function. [] Progressing: [] Met: [] Not Met: [] Adjusted  2. Patient will demonstrate an increase in Strength to 5/5 to allow for proper functional mobility as indicated by patients Functional Deficits. [] Progressing: [x] Met: [] Not Met: [] Adjusted  3. Patient will return to functional activities including work related tasks and sleeping without increased symptoms or restriction. [] Progressing: [x] Met: [] Not Met: [] Adjusted  4. Patient will demonstrate good postural awareness to enable good shoulder mobility and reduction in impingement. [] Progressing: [x] Met: [] Not Met: [] Adjusted    Overall Progression Towards Functional goals/ Treatment Progress Update:  [x] Patient is progressing as expected towards functional goals listed. [] Progression is slowed due to complexities/Impairments listed. [] Progression has been slowed due to co-morbidities.   [] Plan just implemented, too soon to assess goals progression <30days   [] Goals require adjustment due to lack of progress  [] Patient is not progressing as expected and requires additional follow up with physician  [] Other    Persisting Functional Limitations/Impairments:  []Sitting []Standing   []Transfers  [x]Sleeping   [x]Reaching [x]Lifting   [x]ADLs []Housework  []Driving [x]Job related tasks  []Sports/Recreation []Other:    ASSESSMENT:  d/c completed, see above for details. Treatment/Activity Tolerance:  [x] Pt able to complete treatment [] Patient limited by fatique  [] Patient limited by pain  [] Patient limited by other medical complications  [] Other:     Prognosis:  [x] Good [] Fair  [] Poor    Patient Requires Follow-up: [x] Yes  [] No    Return to Play:    [x]  N/A     []  Stage 1: Intro to Strength   []  Stage 2: Dynamic Strength and Intro to Plyometrics   []  Stage 3: Advanced Plyometrics and Intro to Throwing   []  Stage 4: Sport specific Training/Return to Sport     []  Ready to Return to Play, [employer]   []  Not Ready for Return to Sports   Comments:      PLAN: See eval. PT 2x / week for 7 weeks. [] Continue per plan of care [] Alter current plan (see comments)  [] Plan of care initiated [] Hold pending MD visit [x] Discharge    Electronically signed by: Liseth Garcia, PT, DPT      Note: If patient does not return for scheduled/ recommended follow up visits, this note will serve as a discharge from care along with most recent update on progress.

## 2022-09-19 ENCOUNTER — APPOINTMENT (OUTPATIENT)
Dept: CT IMAGING | Age: 62
End: 2022-09-19
Payer: MEDICARE

## 2022-09-19 ENCOUNTER — APPOINTMENT (OUTPATIENT)
Dept: GENERAL RADIOLOGY | Age: 62
End: 2022-09-19
Payer: MEDICARE

## 2022-09-19 ENCOUNTER — HOSPITAL ENCOUNTER (EMERGENCY)
Age: 62
Discharge: ANOTHER HOSPITAL | End: 2022-09-20
Attending: EMERGENCY MEDICINE
Payer: MEDICARE

## 2022-09-19 DIAGNOSIS — S09.90XA CLOSED HEAD INJURY, INITIAL ENCOUNTER: ICD-10-CM

## 2022-09-19 DIAGNOSIS — G93.40 ACUTE ENCEPHALOPATHY: ICD-10-CM

## 2022-09-19 DIAGNOSIS — I60.9 SUBARACHNOID HEMORRHAGE (HCC): Primary | ICD-10-CM

## 2022-09-19 DIAGNOSIS — N30.00 ACUTE CYSTITIS WITHOUT HEMATURIA: ICD-10-CM

## 2022-09-19 LAB
A/G RATIO: 1.4 (ref 1.1–2.2)
ALBUMIN SERPL-MCNC: 4.5 G/DL (ref 3.4–5)
ALP BLD-CCNC: 117 U/L (ref 40–129)
ALT SERPL-CCNC: 25 U/L (ref 10–40)
ANION GAP SERPL CALCULATED.3IONS-SCNC: 11 MMOL/L (ref 3–16)
AST SERPL-CCNC: 28 U/L (ref 15–37)
BACTERIA: ABNORMAL /HPF
BASOPHILS ABSOLUTE: 0.1 K/UL (ref 0–0.2)
BASOPHILS RELATIVE PERCENT: 0.9 %
BILIRUB SERPL-MCNC: 0.5 MG/DL (ref 0–1)
BILIRUBIN URINE: NEGATIVE
BLOOD, URINE: NEGATIVE
BUN BLDV-MCNC: 20 MG/DL (ref 7–20)
CALCIUM SERPL-MCNC: 9.5 MG/DL (ref 8.3–10.6)
CHLORIDE BLD-SCNC: 106 MMOL/L (ref 99–110)
CLARITY: CLEAR
CO2: 21 MMOL/L (ref 21–32)
COLOR: YELLOW
CREAT SERPL-MCNC: 1.3 MG/DL (ref 0.8–1.3)
EOSINOPHILS ABSOLUTE: 0.5 K/UL (ref 0–0.6)
EOSINOPHILS RELATIVE PERCENT: 6.3 %
EPITHELIAL CELLS, UA: 1 /HPF (ref 0–5)
ETHANOL: NORMAL MG/DL (ref 0–0.08)
GFR AFRICAN AMERICAN: >60
GFR NON-AFRICAN AMERICAN: 56
GLUCOSE BLD-MCNC: 79 MG/DL (ref 70–99)
GLUCOSE URINE: NEGATIVE MG/DL
HCT VFR BLD CALC: 37.3 % (ref 40.5–52.5)
HEMOGLOBIN: 12.7 G/DL (ref 13.5–17.5)
HYALINE CASTS: 1 /LPF (ref 0–8)
KETONES, URINE: NEGATIVE MG/DL
LEUKOCYTE ESTERASE, URINE: ABNORMAL
LYMPHOCYTES ABSOLUTE: 1.7 K/UL (ref 1–5.1)
LYMPHOCYTES RELATIVE PERCENT: 20.8 %
MCH RBC QN AUTO: 29.9 PG (ref 26–34)
MCHC RBC AUTO-ENTMCNC: 34.1 G/DL (ref 31–36)
MCV RBC AUTO: 87.7 FL (ref 80–100)
MICROSCOPIC EXAMINATION: YES
MONOCYTES ABSOLUTE: 0.6 K/UL (ref 0–1.3)
MONOCYTES RELATIVE PERCENT: 6.6 %
NEUTROPHILS ABSOLUTE: 5.5 K/UL (ref 1.7–7.7)
NEUTROPHILS RELATIVE PERCENT: 65.4 %
NITRITE, URINE: POSITIVE
PDW BLD-RTO: 14.2 % (ref 12.4–15.4)
PH UA: 5.5 (ref 5–8)
PLATELET # BLD: 235 K/UL (ref 135–450)
PMV BLD AUTO: 8.7 FL (ref 5–10.5)
POTASSIUM REFLEX MAGNESIUM: 4.1 MMOL/L (ref 3.5–5.1)
PROTEIN UA: NEGATIVE MG/DL
RBC # BLD: 4.25 M/UL (ref 4.2–5.9)
RBC UA: 0 /HPF (ref 0–4)
SODIUM BLD-SCNC: 138 MMOL/L (ref 136–145)
SPECIFIC GRAVITY UA: 1.02 (ref 1–1.03)
TOTAL PROTEIN: 7.7 G/DL (ref 6.4–8.2)
TROPONIN: <0.01 NG/ML
URINE REFLEX TO CULTURE: YES
URINE TYPE: ABNORMAL
UROBILINOGEN, URINE: 0.2 E.U./DL
WBC # BLD: 8.4 K/UL (ref 4–11)
WBC UA: 27 /HPF (ref 0–5)

## 2022-09-19 PROCEDURE — 72125 CT NECK SPINE W/O DYE: CPT

## 2022-09-19 PROCEDURE — 70496 CT ANGIOGRAPHY HEAD: CPT

## 2022-09-19 PROCEDURE — 6370000000 HC RX 637 (ALT 250 FOR IP): Performed by: EMERGENCY MEDICINE

## 2022-09-19 PROCEDURE — 87186 SC STD MICRODIL/AGAR DIL: CPT

## 2022-09-19 PROCEDURE — 87040 BLOOD CULTURE FOR BACTERIA: CPT

## 2022-09-19 PROCEDURE — 99285 EMERGENCY DEPT VISIT HI MDM: CPT

## 2022-09-19 PROCEDURE — 84484 ASSAY OF TROPONIN QUANT: CPT

## 2022-09-19 PROCEDURE — 85025 COMPLETE CBC W/AUTO DIFF WBC: CPT

## 2022-09-19 PROCEDURE — 6360000004 HC RX CONTRAST MEDICATION: Performed by: EMERGENCY MEDICINE

## 2022-09-19 PROCEDURE — 87077 CULTURE AEROBIC IDENTIFY: CPT

## 2022-09-19 PROCEDURE — 71045 X-RAY EXAM CHEST 1 VIEW: CPT

## 2022-09-19 PROCEDURE — 6360000002 HC RX W HCPCS: Performed by: EMERGENCY MEDICINE

## 2022-09-19 PROCEDURE — 81001 URINALYSIS AUTO W/SCOPE: CPT

## 2022-09-19 PROCEDURE — 82077 ASSAY SPEC XCP UR&BREATH IA: CPT

## 2022-09-19 PROCEDURE — 96374 THER/PROPH/DIAG INJ IV PUSH: CPT

## 2022-09-19 PROCEDURE — U0005 INFEC AGEN DETEC AMPLI PROBE: HCPCS

## 2022-09-19 PROCEDURE — 70450 CT HEAD/BRAIN W/O DYE: CPT

## 2022-09-19 PROCEDURE — U0003 INFECTIOUS AGENT DETECTION BY NUCLEIC ACID (DNA OR RNA); SEVERE ACUTE RESPIRATORY SYNDROME CORONAVIRUS 2 (SARS-COV-2) (CORONAVIRUS DISEASE [COVID-19]), AMPLIFIED PROBE TECHNIQUE, MAKING USE OF HIGH THROUGHPUT TECHNOLOGIES AS DESCRIBED BY CMS-2020-01-R: HCPCS

## 2022-09-19 PROCEDURE — 87086 URINE CULTURE/COLONY COUNT: CPT

## 2022-09-19 PROCEDURE — 80053 COMPREHEN METABOLIC PANEL: CPT

## 2022-09-19 PROCEDURE — 36415 COLL VENOUS BLD VENIPUNCTURE: CPT

## 2022-09-19 PROCEDURE — 93005 ELECTROCARDIOGRAM TRACING: CPT | Performed by: EMERGENCY MEDICINE

## 2022-09-19 RX ORDER — MELOXICAM 7.5 MG/1
TABLET ORAL
Status: ON HOLD | COMMUNITY
Start: 2022-07-01 | End: 2022-09-20 | Stop reason: CLARIF

## 2022-09-19 RX ORDER — ACETAMINOPHEN 325 MG/1
650 TABLET ORAL ONCE
Status: COMPLETED | OUTPATIENT
Start: 2022-09-19 | End: 2022-09-19

## 2022-09-19 RX ORDER — LISINOPRIL AND HYDROCHLOROTHIAZIDE 12.5; 1 MG/1; MG/1
2 TABLET ORAL DAILY
Status: ON HOLD | COMMUNITY
Start: 2022-09-17 | End: 2022-09-21 | Stop reason: HOSPADM

## 2022-09-19 RX ORDER — ATORVASTATIN CALCIUM 80 MG/1
80 TABLET, FILM COATED ORAL NIGHTLY
COMMUNITY
Start: 2022-09-18

## 2022-09-19 RX ORDER — ASPIRIN 81 MG/1
81 TABLET, CHEWABLE ORAL DAILY
Status: ON HOLD | COMMUNITY
Start: 2022-08-26 | End: 2022-09-21 | Stop reason: HOSPADM

## 2022-09-19 RX ADMIN — IOPAMIDOL 75 ML: 755 INJECTION, SOLUTION INTRAVENOUS at 19:04

## 2022-09-19 RX ADMIN — ACETAMINOPHEN 650 MG: 325 TABLET ORAL at 18:08

## 2022-09-19 RX ADMIN — Medication 1000 MG: at 18:40

## 2022-09-19 ASSESSMENT — PAIN SCALES - GENERAL
PAINLEVEL_OUTOF10: 10
PAINLEVEL_OUTOF10: 8

## 2022-09-19 ASSESSMENT — PAIN - FUNCTIONAL ASSESSMENT: PAIN_FUNCTIONAL_ASSESSMENT: 0-10

## 2022-09-19 ASSESSMENT — PAIN DESCRIPTION - LOCATION: LOCATION: HEAD

## 2022-09-19 NOTE — ED NOTES
Per family pt had a unwitnessed fall, then came back into the house with a large contusion to the back of his head, pt does not recall events and was initially confused about where he was pt arrives to ER and is complaining of pain behind his head, neck pain, pt AAO x 3.       Hemant Means, RN  09/19/22 2166

## 2022-09-19 NOTE — ED PROVIDER NOTES
EMERGENCY DEPARTMENT PROVIDER NOTE    Patient Identification  Pt Name: Cathy Yusuf  MRN: [id number]  [place] 1960  Date of evaluation: 9/19/2022  Provider: Josselin Katz DO  PCP: KANDACE Pollack CNP    Chief Complaint  Fall (Arrived per family d/t a fall and confusion that occurred approx 1 hrs ago)      HPI  (History provided by patient, family)  This is a 58 y.o. male with pertinent past medical history of diabetes, hypertension who was brought in by EMS transportation for altered mental status. Patient present with family who act as additional historians, states the patient came into the house about 1 hour ago with a large lump on the back of his head, he seemed disoriented and did not recall if or how he fell. Patient reports diffuse headache, moderate in severity, nothing makes it any better or worse. Associated with mild blurry vision in his left eye and left-sided neck pain. He does not recall falling. He denies any fevers, chills, chest pain, shortness of breath, extremity weakness or numbness. No nausea or vomiting. Takes daily 81 mg aspirin, no other anticoagulation. ROS    Const:  No fevers, no chills, no generalized weakness  Skin:  No rash, no lesions  Eyes:  +blurry vision, no double vision, no pain  ENT:  No sore throat, no difficulty swallowing, no ear pain, no sinus pain or congestion  Card:  No chest pain, no palpitations, no edema  Resp:  No shortness of breath, no cough, no wheezing  Abd:  No abdominal pain, no nausea, no vomiting, no diarrhea  Genitourinary:  No dysuria, no hematuria  MSK:  No joint pain, no myalgia  Neuro:  No focal weakness, +headache, no paresthesia    All other systems reviewed and negative unless otherwise noted in HPI      I have reviewed the following nursing documentation:  Allergies: Patient has no known allergies.     Past medical history:   Past Medical History:   Diagnosis Date    Diabetes mellitus ([address]     Hypertension      Past surgical history:   Past Surgical History:   Procedure Laterality Date    FINGER TRIGGER RELEASE Right 3/3/2022    RIGHT LONG FINGER TRIGGER FINGER RELEASE performed by Simi Escobedo MD at [place] [address] medications:   Previous Medications    ASPIRIN 81 MG CHEWABLE TABLET    81 mg    ATORVASTATIN (LIPITOR) 80 MG TABLET        HYDROCHLOROTHIAZIDE (HYDRODIURIL) 25 MG TABLET    Take 1 tablet by mouth daily for 30 doses    LISINOPRIL-HYDROCHLOROTHIAZIDE (PRINZIDE;ZESTORETIC) 10-12.5 MG PER TABLET        MELOXICAM (MOBIC) 7.5 MG TABLET    TAKE 1 TABLET BY MOUTH EVERY DAY    METFORMIN (GLUCOPHAGE) 500 MG TABLET    Take 500 mg by mouth 2 times daily (with meals)    SODIUM CHLORIDE (OCEAN) 0.65 % NASAL SPRAY    1 spray by Nasal route as needed for Congestion    TAMSULOSIN (FLOMAX) 0.4 MG CAPSULE    Take 1 capsule by mouth daily       Social history:  reports that he has been smoking. He has never used smokeless tobacco. He reports that he does not drink alcohol and does not use drugs. Family history:  History reviewed. No pertinent family history. Exam  ED Triage Vitals   BP Temp Temp Source Heart Rate Resp SpO2 Height Weight   09/19/22 1550 09/19/22 1548 09/19/22 1548 09/19/22 1548 09/19/22 1548 09/19/22 1548 -- 09/19/22 1551   117/85 97.6 °F (36.4 °C) Oral 66 16 100 %  160 lb (72.6 kg)     Nursing note and vitals reviewed. Constitutional: Well developed, well nourished. Non-toxic in appearance. HENT:      Head: Normocephalic. Occipital hematoma noted, overlying skin intact, no bleeding. No septal or auricular hematoma. No raccoon eyes or duncan sign. Ears: External ears normal.      Nose: Nose normal.     Mouth: Membrane mucosa moist and pink. Eyes: Anicteric sclera. No discharge. PERRL, no nystagmus, normal test of skew. Visual fields intact by confrontation. Neck: Supple. Trachea midline. Tenderness palpation overlying left cervical paraspinal musculature.   No focal midline tenderness. Cardiovascular: RRR; no murmurs, rubs, or gallops. Pulmonary/Chest: Effort normal. No respiratory distress. CTAB. No stridor. No wheezes. No rales. Abdominal: Soft. No distension. Nontender to deep palpation all quadrants. Musculoskeletal: Moves all extremities. No gross deformity. Neurological: Alert, oriented to person and place. Not oriented to month or year. Face symmetric. Speech is clear. CN 2-12 intact. 5/5 motor and sensation grossly intact all extremities. No pronator drift. Normal finger to nose, normal heel to shin. Gait not tested. Skin: Warm and dry. No rash. Psychiatric: Normal mood and affect. Behavior is normal.    Procedures      EKG    EKG was reviewed by emergency department physician in the absence of a cardiologist    Narrow complex sinus rhythm, rate 70, normal axis, normal WV and QRS intervals, normal Qtc, no ST elevations or depressions, normal t-wave morphology, impression NSR, no STEMI      Radiology  CTA HEAD NECK W CONTRAST   Final Result   1. No large vessel occlusion of the intracranial arteries. 2. No intracranial aneurysm or other finding to suggest etiology of patient's   known subarachnoid hemorrhage. 3. Severe stenosis of the P2 segment left posterior cerebral artery. 4. Moderate stenosis of the distal V4 segments bilateral vertebral arteries. 5. Moderate stenosis of the bilateral common carotid arteries. No   significant internal carotid artery stenosis. CT CSpine W/O Contrast   Final Result      Mild multilevel degenerative joint disease within the facets. No evidence of   fracture or malalignment. CT Head W/O Contrast   Final Result      1. Hyperdensity within the sulci of the medial anterior right parietal lobe   near the frontal parietal junction is consistent with subarachnoid   blood/hemorrhage. There also is minimal subdural blood/hematoma along the   anterior falx in the same area.       2.  No definite underlying parenchymal brain abnormality is noted otherwise. 3.  Posterior scalp hematoma. No evidence of skull fracture. These findings were discussed with DR. Lai MENDEZ by telephone at 17:55   on 9/19/2022. XR CHEST PORTABLE   Final Result   No acute cardiopulmonary findings.              Labs  Results for orders placed or performed during the hospital encounter of 09/19/22   CBC with Auto Differential   Result Value Ref Range    WBC 8.4 4.0 - 11.0 K/uL    RBC 4.25 4.20 - 5.90 M/uL    Hemoglobin 12.7 (L) 13.5 - 17.5 g/dL    Hematocrit 37.3 (L) 40.5 - 52.5 %    MCV 87.7 80.0 - 100.0 fL    MCH 29.9 26.0 - 34.0 pg    MCHC 34.1 31.0 - 36.0 g/dL    RDW 14.2 12.4 - 15.4 %    Platelets 331 354 - 555 K/uL    MPV 8.7 5.0 - 10.5 fL    Neutrophils % 65.4 %    Lymphocytes % 20.8 %    Monocytes % 6.6 %    Eosinophils % 6.3 %    Basophils % 0.9 %    Neutrophils Absolute 5.5 1.7 - 7.7 K/uL    Lymphocytes Absolute 1.7 1.0 - 5.1 K/uL    Monocytes Absolute 0.6 0.0 - 1.3 K/uL    Eosinophils Absolute 0.5 0.0 - 0.6 K/uL    Basophils Absolute 0.1 0.0 - 0.2 K/uL   CMP w/ Reflex to MG   Result Value Ref Range    Sodium 138 136 - 145 mmol/L    Potassium reflex Magnesium 4.1 3.5 - 5.1 mmol/L    Chloride 106 99 - 110 mmol/L    CO2 21 21 - 32 mmol/L    Anion Gap 11 3 - 16    Glucose 79 70 - 99 mg/dL    BUN 20 7 - 20 mg/dL    Creatinine 1.3 0.8 - 1.3 mg/dL    GFR Non- 56 (A) >60    GFR African American >60 >60    Calcium 9.5 8.3 - 10.6 mg/dL    Total Protein 7.7 6.4 - 8.2 g/dL    Albumin 4.5 3.4 - 5.0 g/dL    Albumin/Globulin Ratio 1.4 1.1 - 2.2    Total Bilirubin 0.5 0.0 - 1.0 mg/dL    Alkaline Phosphatase 117 40 - 129 U/L    ALT 25 10 - 40 U/L    AST 28 15 - 37 U/L   Troponin   Result Value Ref Range    Troponin <0.01 <0.01 ng/mL   ETOH   Result Value Ref Range    Ethanol Lvl None Detected mg/dL   Urinalysis with Reflex to Culture    Specimen: Urine   Result Value Ref Range    Color, UA Yellow Straw/Yellow Clarity, UA Clear Clear    Glucose, Ur Negative Negative mg/dL    Bilirubin Urine Negative Negative    Ketones, Urine Negative Negative mg/dL    Specific Gravity, UA 1.020 1.005 - 1.030    Blood, Urine Negative Negative    pH, UA 5.5 5.0 - 8.0    Protein, UA Negative Negative mg/dL    Urobilinogen, Urine 0.2 <2.0 E.U./dL    Nitrite, Urine POSITIVE (A) Negative    Leukocyte Esterase, Urine MODERATE (A) Negative    Microscopic Examination YES     Urine Type NotGiven     Urine Reflex to Culture Yes    Microscopic Urinalysis   Result Value Ref Range    Bacteria, UA 4+ (A) None Seen /HPF    Hyaline Casts, UA 1 0 - 8 /LPF    WBC, UA 27 (H) 0 - 5 /HPF    RBC, UA 0 0 - 4 /HPF    Epithelial Cells, UA 1 0 - 5 /HPF   EKG 12 Lead   Result Value Ref Range    Ventricular Rate 70 BPM    Atrial Rate 70 BPM    P-R Interval 204 ms    QRS Duration 106 ms    Q-T Interval 400 ms    QTc Calculation (Bazett) 432 ms    P Axis 3 degrees    R Axis 6 degrees    T Axis 7 degrees    Diagnosis       Normal sinus rhythmInferior infarct , age undeterminedAbnormal ECG       Screenings   Elwin Coma Scale  Eye Opening: Spontaneous  Best Verbal Response: Confused  Best Motor Response: Obeys commands  [place] Coma Scale Score: 14       Is this patient to be included in the SEP-1 Core Measure due to severe sepsis or septic shock? No   Exclusion criteria - the patient is NOT to be included for SEP-1 Core Measure due to: Infection is not suspected      MDM and ED Course    Patient afebrile and nontoxic. No distress. On arrival to the emergency department he is alert and protecting his airway. Neuro exam without focal motor or sensory deficits, however patient does appear disoriented. No hypoglycemia. EKG without evidence of acute ischemia, troponin normal, nothing to suggest ACS or malignant dysrhythmia. Patient does have UTI, unclear if contributing to his encephalopathy. Patient is not septic. Will treat with ceftriaxone. .  CT imaging was pursued which shows evidence of subarachnoid hemorrhage. CT cervical spine nonacute. Patient not currently any anticoagulation needing reversal.  No significant anemia, no thrombocytopenia. Head of bed elevated. Patient has remained normotensive, no indication for antihypertensive therapy currently. Case discussed with Dr. Lizeth Bright for neurosurgery, recommends CTA head and neck to evaluate for aneurysmal bleeding and transfer to [facility] if no aneurysm present. .  CT imaging performed, no aneurysm noted. Case discussed with Dr. Amy Wang for internal medicine team who will accept as transfer to [facility]. Patient remained alert and hemodynamically stable over his emergency department course thus far, on my reevaluation he reported improvement in his headache and remained without neurologic deficits. Patient and family were updated on plan of care and are agreeable. I Dr. Keri Gilbert am the primary clinician of record. Critical Care Time    Upon my evaluation, this patient had a high probability of imminent or life-threatening deterioration due to subarachnoid hemorrhage. Which required my direct attention, intervention, and personal management. Specialist consultation with neurosurgery was obtained    The total critical care time personally spent while evaluating and treating this patient was 40 minutes exclusive of any time spent doing separately billable procedures. This includes time at the bedside, data interpretation, medication management, monitoring for potential decompensation and physician consultation. Specifics of interventions taken and potentially life-threatening diagnostic considerations are listed above in the medical decision making. Final Impression  1. Subarachnoid hemorrhage (HCC)    2. Closed head injury, initial encounter    3. Acute encephalopathy    4. Acute cystitis without hematuria        Blood pressure 97/77, pulse 68, temperature 97.6 °F (36.4 °C), temperature source Oral, resp.  rate 15, weight 160 lb (72.6 kg), SpO2 97 %. Disposition:  DISPOSITION Decision To Transfer 09/19/2022 05:56:18 PM      Patient Referrals:  No follow-up provider specified. Discharge Medications:  New Prescriptions    No medications on file       Discontinued Medications:  Discontinued Medications    No medications on file       This chart was generated using the [address] [place] [address] dictation system. I created this record but it may contain dictation errors given the limitations of this technology.     Woo Ott DO (electronically signed)  Attending Emergency Physician       Woo Ott DO  09/20/22 0023

## 2022-09-19 NOTE — ED NOTES
Report received from Arnoldo, [facility].  This nurse to assume care     Mame Lawrence, ELKIN  09/19/22 8702

## 2022-09-19 NOTE — ED NOTES
Per daughter pt remembers going outside by the garage and he felt dizzy and fell onto the cement driveway and has no memory after that. Pt has 8 (0-10) pain in his head. Pt Alert and oriented x3.       Reinaldo Castellanos RN  09/19/22 1800

## 2022-09-20 ENCOUNTER — APPOINTMENT (OUTPATIENT)
Dept: CT IMAGING | Age: 62
DRG: 084 | End: 2022-09-20
Attending: INTERNAL MEDICINE
Payer: COMMERCIAL

## 2022-09-20 ENCOUNTER — HOSPITAL ENCOUNTER (INPATIENT)
Age: 62
LOS: 1 days | Discharge: HOME | DRG: 084 | End: 2022-09-21
Attending: INTERNAL MEDICINE | Admitting: INTERNAL MEDICINE
Payer: COMMERCIAL

## 2022-09-20 VITALS
RESPIRATION RATE: 16 BRPM | SYSTOLIC BLOOD PRESSURE: 91 MMHG | OXYGEN SATURATION: 93 % | TEMPERATURE: 97.6 F | BODY MASS INDEX: 28.34 KG/M2 | DIASTOLIC BLOOD PRESSURE: 72 MMHG | HEART RATE: 72 BPM | WEIGHT: 160 LBS

## 2022-09-20 PROBLEM — I60.9 SUBARACHNOID HEMORRHAGE (HCC): Status: ACTIVE | Noted: 2022-09-20

## 2022-09-20 LAB
EKG ATRIAL RATE: 70 BPM
EKG DIAGNOSIS: NORMAL
EKG P AXIS: 3 DEGREES
EKG P-R INTERVAL: 204 MS
EKG Q-T INTERVAL: 400 MS
EKG QRS DURATION: 106 MS
EKG QTC CALCULATION (BAZETT): 432 MS
EKG R AXIS: 6 DEGREES
EKG T AXIS: 7 DEGREES
EKG VENTRICULAR RATE: 70 BPM
GLUCOSE BLD-MCNC: 108 MG/DL (ref 70–99)
GLUCOSE BLD-MCNC: 89 MG/DL (ref 70–99)
GLUCOSE BLD-MCNC: 98 MG/DL (ref 70–99)
PERFORMED ON: ABNORMAL
PERFORMED ON: NORMAL
PERFORMED ON: NORMAL
SARS-COV-2, PCR: NOT DETECTED

## 2022-09-20 PROCEDURE — 93010 ELECTROCARDIOGRAM REPORT: CPT | Performed by: INTERNAL MEDICINE

## 2022-09-20 PROCEDURE — 6370000000 HC RX 637 (ALT 250 FOR IP): Performed by: STUDENT IN AN ORGANIZED HEALTH CARE EDUCATION/TRAINING PROGRAM

## 2022-09-20 PROCEDURE — APPNB45 APP NON BILLABLE 31-45 MINUTES

## 2022-09-20 PROCEDURE — 1200000000 HC SEMI PRIVATE

## 2022-09-20 PROCEDURE — 70450 CT HEAD/BRAIN W/O DYE: CPT

## 2022-09-20 PROCEDURE — 92610 EVALUATE SWALLOWING FUNCTION: CPT

## 2022-09-20 RX ORDER — LABETALOL HYDROCHLORIDE 5 MG/ML
10 INJECTION, SOLUTION INTRAVENOUS EVERY 10 MIN PRN
Status: DISCONTINUED | OUTPATIENT
Start: 2022-09-20 | End: 2022-09-21 | Stop reason: HOSPADM

## 2022-09-20 RX ORDER — ONDANSETRON 4 MG/1
4 TABLET, ORALLY DISINTEGRATING ORAL EVERY 8 HOURS PRN
Status: DISCONTINUED | OUTPATIENT
Start: 2022-09-20 | End: 2022-09-21 | Stop reason: HOSPADM

## 2022-09-20 RX ORDER — ATORVASTATIN CALCIUM 80 MG/1
80 TABLET, FILM COATED ORAL NIGHTLY
Status: DISCONTINUED | OUTPATIENT
Start: 2022-09-20 | End: 2022-09-21 | Stop reason: HOSPADM

## 2022-09-20 RX ORDER — INSULIN LISPRO 100 [IU]/ML
0-4 INJECTION, SOLUTION INTRAVENOUS; SUBCUTANEOUS
Status: DISCONTINUED | OUTPATIENT
Start: 2022-09-20 | End: 2022-09-21 | Stop reason: HOSPADM

## 2022-09-20 RX ORDER — NAPHAZOLINE/ZINC SULF/GLYCERIN 0.012-0.25
1 DROPS OPHTHALMIC (EYE) PRN
COMMUNITY

## 2022-09-20 RX ORDER — LISINOPRIL AND HYDROCHLOROTHIAZIDE 12.5; 1 MG/1; MG/1
1 TABLET ORAL DAILY
Status: DISCONTINUED | OUTPATIENT
Start: 2022-09-20 | End: 2022-09-21 | Stop reason: HOSPADM

## 2022-09-20 RX ORDER — HYDROCHLOROTHIAZIDE 25 MG/1
25 TABLET ORAL DAILY
Status: DISCONTINUED | OUTPATIENT
Start: 2022-09-20 | End: 2022-09-20 | Stop reason: ALTCHOICE

## 2022-09-20 RX ORDER — ONDANSETRON 2 MG/ML
4 INJECTION INTRAMUSCULAR; INTRAVENOUS EVERY 6 HOURS PRN
Status: DISCONTINUED | OUTPATIENT
Start: 2022-09-20 | End: 2022-09-21 | Stop reason: HOSPADM

## 2022-09-20 RX ORDER — ACETAMINOPHEN 325 MG/1
650 TABLET ORAL EVERY 4 HOURS PRN
Status: DISCONTINUED | OUTPATIENT
Start: 2022-09-20 | End: 2022-09-21 | Stop reason: HOSPADM

## 2022-09-20 RX ORDER — DEXTROSE MONOHYDRATE 100 MG/ML
INJECTION, SOLUTION INTRAVENOUS CONTINUOUS PRN
Status: DISCONTINUED | OUTPATIENT
Start: 2022-09-20 | End: 2022-09-21 | Stop reason: HOSPADM

## 2022-09-20 RX ORDER — INSULIN LISPRO 100 [IU]/ML
0-4 INJECTION, SOLUTION INTRAVENOUS; SUBCUTANEOUS NIGHTLY
Status: DISCONTINUED | OUTPATIENT
Start: 2022-09-20 | End: 2022-09-21 | Stop reason: HOSPADM

## 2022-09-20 RX ORDER — ACETAMINOPHEN 500 MG
500 TABLET ORAL EVERY 6 HOURS PRN
COMMUNITY

## 2022-09-20 RX ORDER — TAMSULOSIN HYDROCHLORIDE 0.4 MG/1
0.4 CAPSULE ORAL DAILY
Status: DISCONTINUED | OUTPATIENT
Start: 2022-09-20 | End: 2022-09-21 | Stop reason: HOSPADM

## 2022-09-20 RX ADMIN — ACETAMINOPHEN 650 MG: 325 TABLET, FILM COATED ORAL at 12:03

## 2022-09-20 RX ADMIN — ATORVASTATIN CALCIUM 80 MG: 80 TABLET, FILM COATED ORAL at 20:57

## 2022-09-20 RX ADMIN — TAMSULOSIN HYDROCHLORIDE 0.4 MG: 0.4 CAPSULE ORAL at 10:03

## 2022-09-20 ASSESSMENT — PAIN SCALES - GENERAL
PAINLEVEL_OUTOF10: 0
PAINLEVEL_OUTOF10: 5

## 2022-09-20 ASSESSMENT — PAIN DESCRIPTION - LOCATION: LOCATION: HEAD

## 2022-09-20 ASSESSMENT — PAIN DESCRIPTION - ORIENTATION: ORIENTATION: MID;POSTERIOR

## 2022-09-20 ASSESSMENT — PAIN DESCRIPTION - DESCRIPTORS: DESCRIPTORS: ACHING

## 2022-09-20 NOTE — PLAN OF CARE
Patient alert and oriented, stable neuro checks, pain well controlled, using call light for needs, bed alarm in place.

## 2022-09-20 NOTE — PROGRESS NOTES
Speech Language Pathology - Contact Note - No Treatment  Order received and chart reviewed. Pt is NPO and has neurosurgery consult. Will hold clinical swallow assessment and re-attempt at a later time/date. Please page ST department with any concerns.     Electronically Signed by:  Nata Turner, [address] [facility]  Speech-Language Pathologist  [phone] [phone]  Pager #[phone]

## 2022-09-20 NOTE — CONSULTS
Clinical Pharmacy Progress Note    All IVs in NS - Management by Pharmacy    Consult Date(s): 9/20  Consulting Provider(s): Dr Arpita Grier / Lalitha THOMAS s/p fall at home - All IVs in Normal Saline  Drips will be adjusted to normal saline as appropriate based on compatibility, in an effort to avoid fluid shifts, as D5W is osmotically active. The following intermittent IV drips / infusions have been adjusted to saline:  None at this time  The following medications must remain in D5W due to incompatibility with normal saline:  None at this time  Note: Patient has dextrose ordered as part of hypoglycemia treatment protocol. Total IV fluid delivered to patient over last 24 hrs: TBD in 24h  Pharmacist will follow daily to ensure all IVPBs / drips are in NS where possible. Thank you for consulting Pharmacy!     Juan F VenturaD., [facility]   9/20/2022 8:39 AM  Wireless: [phone]

## 2022-09-20 NOTE — LETTER
Dudley Kumari and Velma Landry accompanied Jose Alejandro Mesa to the ICU floor and provided care for him on 9/19/2022. They may return to work on 09/23/2022 as they need to provide care him. If you have any questions or concerns, please don't hesitate to call.   [phone]    Dr. Karlene Alexis MD  Resident Physician  [facility]

## 2022-09-20 NOTE — PROGRESS NOTES
Progress Note        Date:2022       [id number]  Patient 151Malinda Amezquita     YOB: 1960     Age:62 y.o. [facility] , [facility]. Transferred from [facility] for Neurosurgical opinion. Subjective   Interval History Status: improved. Confusion resolved. C/o head ache  Per family at baseline     Review of Systems   ROS as mentioned above. Medications   Scheduled Meds:    atorvastatin  80 mg Oral Nightly    [Held by provider] lisinopril-hydroCHLOROthiazide  1 tablet Oral Daily    tamsulosin  0.4 mg Oral Daily    insulin lispro  0-4 Units SubCUTAneous TID WC    insulin lispro  0-4 Units SubCUTAneous Nightly     Continuous Infusions:    dextrose       PRN Meds: sodium chloride, glucose, dextrose bolus **OR** dextrose bolus, glucagon (rDNA), dextrose, acetaminophen, ondansetron **OR** ondansetron, labetalol    Past History    Past Medical History:   has a past medical history of Diabetes mellitus ([address] and Hypertension. Social History:   reports that he has been smoking. He has never used smokeless tobacco. He reports that he does not drink alcohol and does not use drugs. Family History: No family history on file. Physical Examination      Vitals:  BP 99/76   Pulse 66   Temp 98.5 °F (36.9 °C) (Oral)   Resp 16   Wt 151 lb 3.8 oz (68.6 kg)   SpO2 92%   BMI 26.79 kg/m²   Temp (24hrs), Av.4 °F (36.9 °C), Min:97.6 °F (36.4 °C), Max:98.9 °F (37.2 °C)      I/O (24Hr): Intake/Output Summary (Last 24 hours) at 2022 1531  Last data filed at 2022 1400  Gross per 24 hour   Intake --   Output 450 ml   Net -450 ml       Physical Exam  Constitutional:       Appearance: He is ill-appearing. HENT:      Head: Normocephalic and atraumatic. Cardiovascular:      Rate and Rhythm: Normal rate and regular rhythm. Pulses: Normal pulses. Heart sounds: Normal heart sounds. Pulmonary:      Effort: Pulmonary effort is normal.      Breath sounds: Normal breath sounds. Abdominal:      General: Abdomen is flat. Palpations: Abdomen is soft. Skin:     General: Skin is warm and dry. Capillary Refill: Capillary refill takes less than 2 seconds. Neurological:      General: No focal deficit present. Mental Status: He is alert. Mental status is at baseline. Psychiatric:         Mood and Affect: Mood normal.         Behavior: Behavior normal.               Assessment      Principal Problem:    Subarachnoid hemorrhage (HCC)  Resolved Problems:    * No resolved hospital problems. *       Plan:        SAH  2/2 fall  Hold antiplatelet  NS consulted, no operative intervention planned. Fall  Suspect 2/2 low BP  Have pharmacy rectify med rec. Hold antihypertensive meds    Discussed with family at bed side.        Stephanie Carrion MD  3:31 PM  09/20/22

## 2022-09-20 NOTE — CONSULTS
Pharmacy Consult Note  - Admission Medication Reconciliation      Pharmacy consulted for reconciliation of qmdle-[id number] medications. I reviewed Rx fill history via \"Complete Dispense Report\" in [employer], spoke to [facility] pharmacist, and spoke to patient's daughter.       The following changes made to [id number] medication list:    ADDED:  1) acetaminophen   2) Clear Eyes     Dose or Frequency CHANGE:  1) aspirin 81 mg --> daily   2) atorvastatin --> every evening   3) lisinopril-HCTZ--> 2 tablets daily     REMOVED:  1) sodium chloride   2) meloxicam   3) HCTZ       Current Outpatient Medications   Medication Instructions    acetaminophen (TYLENOL) 500 mg, Oral, EVERY 6 HOURS PRN    aspirin 81 mg, Oral, DAILY    atorvastatin (LIPITOR) 80 mg, Oral, Nightly    Carboxymethylcellul-Glycerin (CLEAR EYES FOR DRY EYES) 1-0.25 % SOLN 1 drop, Ophthalmic, PRN    lisinopril-hydroCHLOROthiazide (PRINZIDE;ZESTORETIC) 10-12.5 MG per tablet 2 tablets, Oral, DAILY    metFORMIN (GLUCOPHAGE) 500 mg, Oral, 2 TIMES DAILY WITH MEALS    tamsulosin (FLOMAX) 0.4 mg, Oral, DAILY       Thank you for the consult       9/20/2022 5:25 PM  Teetee Mirza  PharmD Candidate   Class of 2023

## 2022-09-20 NOTE — PROGRESS NOTES
4 Eyes Admission Assessment     I agree as the admission nurse that 2 RN's have performed a thorough Head to Toe Skin Assessment on the patient. ALL assessment sites listed below have been assessed on admission. Areas assessed by both nurses:   [x]   Head, Face, and Ears   [x]   Shoulders, Back, and Chest  [x]   Arms, Elbows, and Hands   [x]   Coccyx, Sacrum, and Ischium  [x]   Legs, Feet, and Heels        Does the Patient have Skin Breakdown?   No         Jacob Prevention initiated:  NA   Wound Care Orders initiated:  NA      WOC nurse consulted for Pressure Injury (Stage 3,4, Unstageable, DTI, NWPT, and Complex wounds) or Jacob score 18 or lower:  No      Nurse 1 eSignature: Electronically signed by Be Chacon RN on 9/20/22 at 6:47 AM EDT    **SHARE this note so that the co-signing nurse is able to place an eSignature**    Nurse 2 eSignature: Electronically signed by Radha Rayo RN on 9/21/22 at 6:58 AM EDT

## 2022-09-20 NOTE — PROGRESS NOTES
Speech Language Pathology  Facility/Department: [facility] ICU   CLINICAL BEDSIDE SWALLOW EVALUATION &  DISCHARGE    NAME: Jose Alejandro Liu  : 1960  MRN: [id number]    ADMISSION DATE: 2022  ADMITTING DIAGNOSIS: has Chronic left shoulder pain; Trigger middle finger of right hand; Type 2 diabetes mellitus without complication, without long-term current use of insulin ([address] Abrasion of right hand; Rotator cuff tendinitis, left; and Subarachnoid hemorrhage ([address] on their problem list.    ONSET DATE: 22    Recent CXR: (22)  Impression   No acute cardiopulmonary findings. Recent Head CT: (22)  Impression       Area of subarachnoid hemorrhage seen involving cortical sulci along the medial right frontal lobe appears stable with small associated subdural bleed also suspected which likewise appears essentially stable from prior exam.       Date of Eval: 2022  Evaluating Therapist: JOANIE Austin    Current Diet level:  Current Diet : NPO    Primary Complaint  Patient Complaint: no swallow complaint/concern. Pt does report head pain - RN notified. Pain:  Pain Assessment  Pain Assessment: 0-10  Pain Level: 0  Patient's Stated Pain Goal: 2  Pain Location: Head  Pain Orientation: Mid, Posterior  Pain Descriptors: Aching  Response to Pain Intervention: Patient satisfied  Multiple Pain Sites: No    Reason for Referral  Jose Alejandro Liu was referred for a bedside swallow evaluation to assess the efficiency of his swallow function, identify signs and symptoms of aspiration and make recommendations regarding safe dietary consistencies, effective compensatory strategies, and safe eating environment. Impression  Clinical swallow assessment completed using video  for [place] language. Oropharyngeal swallow appears WFL. Labial seal is adequate, mastication (with a cracker) appears adequate, oral clearance is good, swallow initiation and laryngeal elevation appear adequate.   There are no clinical s/s aspiration observed with any consistency attempted: puree, regular texture solids, thin liquids (via cup, via straw, and 3 oz continous sequential). Pt has no complaints/concerns re: swallow, cognition, or communication. Family reports pt is communicating at baseline. Per chart pt is afebrile, tolerating room air and breath sounds are clear-diminished. Recommend Regular texture diet with Thin Liquids. No further ST indicated at this time. Treatment Plan  No f/u ST indicated at this time. Recommended Diet and Intervention  Regular Texture with Thin Liquids    Compensatory Swallowing Strategies  General Swallow Recommendations:   Upright as possible for all oral intake  Small bites/sips  Eat/Feed slowly    Treatment/Goals  N/A    General  Chart Reviewed: Yes  Behavior/Cognition: Alert; Cooperative;Pleasant mood  Temperature Spikes Noted: No  Respiratory Status: Room air  O2 Device: None (Room air)  Communication Observation: Functional (in native language - [place])  Follows Directions: Simple  Dentition: Adequate  Patient Positioning: Upright in bed  Baseline Vocal Quality: Normal  Volitional Cough: Strong  Prior Dysphagia History: denied    Vision/Hearing  No concerns reported for this assessment    Oral Motor Deficits  WFL    Oral Phase Dysfunction  WFL     Indicators of Pharyngeal Phase Dysfunction  WFL    Prognosis  excellent    Education  Using video  for [employer] language, SLP educated pt and family (wife, daughter) re: role of SLP, s/s aspiration to report, and diet/POC recommendations. They verbalized comprehension and family requested option to bring pt traditional food from home (shared with RN and NP). Therapy Time  30 minutes      Plan  Diet Recommendations:    Diet Solids Recommendation: Regular   Liquid Consistency Recommendation: Thin  Discharge Plan: no further ST indicated at this time  Discussed with RNSukhdeep. Needs within reach.     Electronically Signed by:  Patricia Douglas M.A., [address] [id number]  Pager #[phone]  This document will serve as a discharge summary if pt discharges before next treatment.    9/20/2022 3:25 PM

## 2022-09-20 NOTE — ED NOTES
Report called to Familia Fay at [facility].  All questions answered     Martha Mcmillan RN  09/20/22 5988

## 2022-09-20 NOTE — DISCHARGE INSTRUCTIONS
Please do not resume taking your lisinopril/hydrochlorothiazide once you have been discharges. Follow up with your primary care provider within 1 week for further recommendations about hypertension management. Mild Traumatic Brain Injury (TBI)/Concussion    What is Traumatic Brain Injury? Traumatic Brain Injury (TBI) is an injury to the brain caused by a blow or jolt to the head from blunt or penetrating trauma. The injury that occurs at the moment of impact is known as the primary injury. Primary injuries can involve a specific part of the brain or the entire brain. Immediately after the accident the person may be confused, not remember what happened, have blurry vision, dizziness, or lose consciousness. What are the symptoms? Depending on the type and location of the injury, the person's symptoms may include: Loss of consciousness   Confusion and disorientation  Memory loss / amnesia   Fatigue   Headaches   Visual problems   Poor attention / concentration   Sleep disturbances   Dizziness / loss of balance   Irritability / emotional disturbances   Feelings of depression   Seizures  Vomiting    Treatment:  Patients with a Mild TBI usually do not require surgery. They generally need rest and sometimes may need medications to help relieve headaches. Recovery: After a mild brain injury, patients can have a wide variation of symptoms. Some patients may not experience any symptoms while others can have severe symptoms with headaches, dizziness, memory problems, sleep disorders, fatigue, changing emotions, and/or seizures. Next is a description of the common problems people experience after a mild brain injury:  Headaches:   Headaches are a very common problem after a Mild TBI. Most patients with a mild TBI will experience headaches. Over the counter acetaminophen is the best medication to treat your headaches.  In most patients, headaches will go away within 1-2 weeks and they should gradually improve with time. If your headache were to worsen or become severe and unrelieved by pain medication, then call your healthcare provider. Dizziness:  Dizziness is another very common symptom. The dizziness should improve with time; however, if it becomes severe and makes it difficult to complete tasks, please contact your health care provider to discuss options for treating these symptoms. Memory problems:  Memory problems are another common problem among patients with a Mild TBI. This can range from problems with organizing tasks to problems remembering names or the grocery list. This is called your short-term memory. Generally, these memory problems are mild and will resolve over time, however some patients may need to get help from a speech therapist for more severe or persistent symptoms. A speech therapist can help with ways to organize and provide tips to practice on improving your short-term memory. Sleep disorders / Fatigue:  Patients with a mild TBI can also have problems with sleep and fatigue. Initially after a head injury patient will feel tired, need frequent rest periods, and may want to sleep most of the time for the first several days. It is important to increase your activity level gradually every day. Increasing your activity to include light exercise will help your symptoms to improve more quickly. When going back to work it is important to take this into account. You might need to work part-time for the first week to build up your endurance before going back full-time. Like all other symptoms with a mild TBI this will diminish with time. Emotions:  After a mild TBI some patients have trouble controlling their emotions. This means they might get mad or angry in a situation where they normally wouldn't. Sometimes patients say they cry easily, even at a sad commercial on TV. This can be a huge adjustment and can cause strain on relationships.  Caregivers or spouses may be more likely to notice these subtle changes in the patient's personality. If these symptoms are persistent contacting the patient's physician to get a neuropsychological evaluation may identify specific cognitive areas that were weakened or changed by the TBI and help provide direction for further care. Seizures:   Seizures can occur anytime the brain is injured. Patients with a mild TBI are often not given seizure medications because they have a low risk of having a seizure, however at the discretion of the medical team some patients may be put on medication to prevent seizures from occurring for a short period of time after the injury. Prevention  Tips to reduce the risk for a head injury:  Always wear your helmet when riding a bicycle, motorcycle, skateboard, or all-terrain vehicle. Never drive under the influence of alcohol or drugs. Always wear your seat belt and ensure that children are secured in the appropriate child safety seats. Avoid falls in the home by keeping unsecured items off the floor, installing safety features such as non-slip mats in the bathtub, handrails on stairways, and keeping items off of stairs. Avoid falls by participating in an exercise program to increase strength, balance, and coordination. Store firearms in a locked cabinet with bullets in a separate location. Wear protective headgear while playing sports. When should I return to the emergency room? After a patient is discharged home, they will need constant supervision for the next 24-48 hours from a loved one to watch for changes. They need to return to the emergency room if the following issues occur:   If the patient is unable to be awoken from sleep  If the patient has new onset nausea and vomiting or nausea and vomiting that is unable to be controlled with medication  If the patient becomes confused or disoriented  If the patient develops weakness on one side of their body  If the patient develops difficultly talking or understanding what is being said  If the patient has a seizure    Resources: The Brain Injury Association:  www.biao. [email or web address]. org

## 2022-09-20 NOTE — PROGRESS NOTES
Transfer From ICU to Medical Floor    The patient was seen and examined. This is a 58 y.o. male with pertinent past medical history of diabetes, hypertension who was brought in by EMS transportation for altered mental status. Patient present with family who act as additional historians, states the patient came into the house about 3 hour ago with a large lump on the back of his head. On presentation, he seemed disoriented and did not recall if or how he fell. Patient reports diffuse headache, moderate in severity, nothing makes it any better or worse it was associated with mild blurry vision in his left eye which is chronic from 2-3 weeks ago, LUE weakness which is chronic also and left-sided neck pain that was relatively new. He does remember falling and then blacking out and 3 hours later waking up in a bed. He denies any prodromal symptoms like lightheadedness, racing of heart or seizure-like activity during the incident. He denies any fevers, chills, chest pain, shortness of breath, extremity weakness or numbness. No nausea or vomiting. Takes daily 81 mg aspirin, no other anticoagulation. At home he is also on lisinopril. In ED, he was alert and was protecting his airways. On physical exam he had no neurological deficits but did seem disoriented. His EKG was WNL. CT scan was significant for subarachnoid hemorrhage. CT cervical spine was WNL. Vitals were WNL. NSGY and neurology were consulted. Repeated CT scan was stable, neurosurgery signed off. Patient was under acceptable conditions to be transferred to the [facility]        Vitals:    09/20/22 1800   BP:    Pulse: 73   Resp: 16   Temp:    SpO2: 92%       Physical Exam  General appearance: alert, appears stated age and cooperative  Skin: Skin color, texture, turgor normal.   HEENT: Head: Normocephalic, no lesions, without obvious abnormality. Pharynx: Dental Hygiene adequate. Normal buccal mucosa. Normal pharynx.   Neck: no adenopathy, no carotid bruit, no JVD, supple, symmetrical, trachea midline and thyroid not enlarged, symmetric, no tenderness/mass/nodules  Lungs: clear to auscultation bilaterally  Heart: regular rate and rhythm, S1, S2 normal, no murmur, click, rub or gallop  Abdomen: distention, soft, non-tender; bowel sounds normal; no masses,  no organomegaly  Extremities: extremities normal, atraumatic, no cyanosis or edema  Neurologic: CN II-XII grossly intact. Mental status: Alert, oriented, thought content appropriate. LUE weakness, bl;    @  Intake/Output Summary (Last 24 hours) at 9/20/2022 1841  Last data filed at 9/20/2022 1400  Gross per 24 hour   Intake --   Output 450 ml   Net -450 ml     Lab Results   Component Value Date    CREATININE 1.3 09/19/2022    BUN 20 09/19/2022     09/19/2022    K 4.1 09/19/2022     09/19/2022    CO2 21 09/19/2022     Lab Results   Component Value Date    WBC 8.4 09/19/2022    HGB 12.7 (L) 09/19/2022    HCT 37.3 (L) 09/19/2022    MCV 87.7 09/19/2022     09/19/2022          Scheduled Meds:   atorvastatin  80 mg Oral Nightly    [Held by provider] lisinopril-hydroCHLOROthiazide  1 tablet Oral Daily    tamsulosin  0.4 mg Oral Daily    insulin lispro  0-4 Units SubCUTAneous TID WC    insulin lispro  0-4 Units SubCUTAneous Nightly     Continuous Infusions:   dextrose       PRN Meds:sodium chloride, glucose, dextrose bolus **OR** dextrose bolus, glucagon (rDNA), dextrose, acetaminophen, ondansetron **OR** ondansetron, labetalol    Assessment and plan:      Unwitnessed fall 2/2 likely Vasovagal syncope  -Follow up pharmacy to med rec  -Hold antihypertensive meds  -Patient will likely benefit from cardiac work up due to suspicion of syncopal episodes    TBI 2/2 Subarachnoid Hemorrhage, Subdural Hemorrhage (stable)  Fall in parking lot, CT Head with SAH. CTA negative for aneurysm. SAH most likely secondary to fall.   -HOB  -On Atorvastatin, holding Aspirin  -q4 neurochecks  -SBP<160  -PT/OT eval  -SLP  -HBA1C ordered awaiting results  -Repeat CT ordered stable finding  -No reversal of antiplatelets or anticoagulation required  -PLT>100K  -Hold anticoagulations and antiplatelets for 2 weeks  -Neurosrgery and neurology consulted  -Headaches: Tylenol 650mg PO Q6H PRN  - Dizziness: Meclizine 25mg PO TID PRN  - Nausea: Zofran 4mg IV Q6H PRN. Consider scopolamine patch for persistent nausea  -Orthostatic hypotension ordered     Chronic conditions:  HTN  -On HCTZ-Lisinopril at home-held for hypotension     T2DM  On metformin at home   -On SSI here     BPH  -On Flomax        The objective and subjective findings as well as the ICU course of treatment have been reviewed with the ICU team. The treatment plan has been reviewed with the ICU team. The patient is being transferred to [place] [id number] in stable condition.     Electronically signed by Silver Ann MD on 9/20/22 at 6:42 PM EDT

## 2022-09-20 NOTE — CARE COORDINATION
Case management is following for discharge planning. The chart was reviewed. Mr. Sandy Kimball is from home with his spouse. He is independent with self care and functional mobility at baseline. PT/OT evaluations and recommendations are needed to assist with disposition.     Electronically signed by MARCELLA Zhu RN-[facility]  Case Management  [phone]

## 2022-09-20 NOTE — CONSULTS
NEUROSURGERY CONSULT NOTE    PEDRO Estrada Race  [id number]   1960 9/20/2022    Requesting physician: Aliya York MD    Reason for consultation: [facility]    History of present illness: Patient is a 58 y.o. male w/ PMH of DM and HTN who takes ASA, who presented to the ED by EMS on 9/20/2022 for altered mental status. Family is at the bedside and they stated the patient came into the house around 2pm yesterday disoriented. He had a large lump on the back of his head. He initially told family that he did not fall. Family reports that around 5pm he remembered falling outside but remembers nothing else about the fall or how it occurred. Unknown LOC or height of the fall. In the ED CT showed a hyperdensity within the sulci of the medial anterior right parietal lobe concerning for SAH, and a minimal SDH along the anterior falx. Neurosurgery was consulted and CTA was recommended. The CTA showed no large vessel occulusion or aneurysm but did show severe stenosis of the P2 segment of the Left PCA, moderate stenosis of the distal V4 segments of the bilateral vertebral arteries, and moderate stenosis of the bilateral carotid arteries. ROS:   GENERAL:  Denies fever or recent illness. Denies weight changes   EYES:  Denies vision change or diplopia  CARDIAC:  Denies chest pain  RESPIRATORY:  Denies shortness of breath  NEURO:  Denies headache, no numbness or tingling or lateralizing weakness       No Known Allergies    Past Medical History:   Diagnosis Date    Diabetes mellitus ([address]     Hypertension         Past Surgical History:   Procedure Laterality Date    FINGER TRIGGER RELEASE Right 3/3/2022    RIGHT LONG FINGER TRIGGER FINGER RELEASE performed by Fei Adams MD at [address] [place] [address] History    Not on file   Tobacco Use    Smoking status: Every Day    Smokeless tobacco: Never   Vaping Use    Vaping Use: Never used   Substance and Sexual Activity    Alcohol use:  No Drug use: No    Sexual activity: Not on file        No family history on file. No outpatient medications have been marked as taking for the 9/20/22 encounter [facility] Encounter).         Current Facility-Administered Medications   Medication Dose Route Frequency Provider Last Rate Last Admin    atorvastatin (LIPITOR) tablet 80 mg  80 mg Oral Nightly Orestes Krishnamurthy MD        [Held by provider] lisinopril-hydroCHLOROthiazide (PRINZIDE;ZESTORETIC) 10-12.5 MG per tablet 1 tablet  1 tablet Oral Daily Orestes Baptiste MD        tamsulosin (FLOMAX) capsule 0.4 mg  0.4 mg Oral Daily Oerstes Baptiste MD   0.4 mg at 09/20/22 1003    sodium chloride (OCEAN, BABY AYR) 0.65 % nasal spray 1 spray  1 spray Nasal PRN Orestes Baptiste MD        insulin lispro (1 Unit Dial) 0-4 Units  0-4 Units SubCUTAneous TID WC Orestes Baptiste MD        insulin lispro (1 Unit Dial) 0-4 Units  0-4 Units SubCUTAneous Nightly Orestes Baptiste MD        glucose chewable tablet 16 g  4 tablet Oral PRN Orestes Baptiste MD        dextrose bolus 10% 125 mL  125 mL IntraVENous PRN Orestes Baptiste MD        Or    dextrose bolus 10% 250 mL  250 mL IntraVENous PRN Orestes Baptiste MD        glucagon (rDNA) injection 1 mg  1 mg SubCUTAneous PRN Orestes Baptiste MD        dextrose 10 % infusion   IntraVENous Continuous PRN Orestes Perez MD        acetaminophen (TYLENOL) tablet 650 mg  650 mg Oral Q4H PRN Yanet Wiley MD   650 mg at 09/20/22 1203    ondansetron (ZOFRAN-ODT) disintegrating tablet 4 mg  4 mg Oral Q8H PRN Orestes Baptiste MD        Or    ondansetron (ZOFRAN) injection 4 mg  4 mg IntraVENous Q6H PRN Orestes Baptiste MD        labetalol (NORMODYNE;TRANDATE) injection 10 mg  10 mg IntraVENous Q10 Min PRN Orestes Baptiste MD            Objective:  BP 98/66   Pulse 78   Temp 98.5 °F (36.9 °C) (Oral)   Resp 14   Wt 151 lb 3.8 oz (68.6 kg)   SpO2 96%   BMI 26.79 kg/m²     Physical Exam:   Patient seen and examined  GCS:  4 - Opens eyes on own  5 - Alert and oriented  6 - Follows simple motor commands  General: Well developed. Alert and cooperative in no acute distress. HENT: atraumatic, neck supple  Eyes: Optic discs: Not tested  Pulmonary: unlabored respiratory effort  Cardiovascular:  Warm well perfused. No peripheral edema  Gastrointestinal: abdomen soft, NT, ND    Neurological:  Mental Status: Awake, alert, oriented x 4, speech clear and appropriate  Attention: Intact  Language: No aphasia or dysarthria noted  Sensation: Intact to all extremities to light touch  Coordination: Intact    Cranial Nerves:  II: Visual acuity not tested, denies new visual changes / diplopia  III, IV, VI: PERRL, 4 mm bilaterally, EOMI, no nystagmus noted  VII: Face symmetric  VIII: Hearing intact bilaterally to spoken voice  IX: Palate movement equal bilaterally    Musculoskeletal:   Gait: Not tested   Assist devices: None   Tone: normal  Motor strength: moving all extremities with no motor deficits    Radiological Findings:    CT HEAD WO CONTRAST   Final Result      Area of subarachnoid hemorrhage seen involving cortical sulci along the medial right frontal lobe appears stable with small associated subdural bleed also suspected which likewise appears essentially stable from prior exam.                Without contrast        CT head WO contrast:      FINDINGS:       Brain:  Hyperdensity within the sulci of the medial anterior right parietal   lobe near the frontal parietal junction is consistent with subarachnoid   blood/hemorrhage. There also is minimal subdural blood/hematoma along the   anterior falx in the same area. No definite underlying parenchymal brain   abnormality is noted otherwise. No significant white matter disease. Ventricles:  No acute findings. No ventriculomegaly. Bones/joints:  Small calcified osteoma within the left ethmoid sinus region. There is mild left sphenoid chronic sinusitis. No acute fracture.        Soft tissues:  Posterior scalp hematoma. Sinuses:  See above. Mastoid air cells:  Unremarkable as visualized. No mastoid effusion. Impression       1. Hyperdensity within the sulci of the medial anterior right parietal lobe   near the frontal parietal junction is consistent with subarachnoid   blood/hemorrhage. There also is minimal subdural blood/hematoma along the   anterior falx in the same area. 2.  No definite underlying parenchymal brain abnormality is noted otherwise. 3.  Posterior scalp hematoma. No evidence of skull fracture. These findings were discussed with DR. Corey MENDEZ by telephone at 17:55   on 9/19/2022. CTA head and neck:     FINDINGS:       CTA NECK:       AORTIC ARCH/ARCH VESSELS: No dissection or arterial injury. No significant   stenosis of the brachiocephalic or subclavian arteries. CAROTID ARTERIES:       Right: Moderate stenosis of the mid to distal right common carotid artery   secondary to eccentric noncalcified plaque. The right internal and external   carotid arteries are normal in caliber. No dissection. Left: Moderate stenosis of the mid left common carotid artery secondary to   eccentric noncalcified plaque. The left internal and external carotid   arteries are normal in caliber. No dissection. VERTEBRAL ARTERIES: No dissection, arterial injury, or significant stenosis. SOFT TISSUES: The lung apices are clear. No cervical or superior mediastinal   lymphadenopathy. The larynx and pharynx are unremarkable. No acute   abnormality of the salivary and thyroid glands. BONES: No acute osseous abnormality. CTA HEAD:       ANTERIOR CIRCULATION: No significant stenosis of the intracranial internal   carotid, anterior cerebral, or middle cerebral arteries. No aneurysm. POSTERIOR CIRCULATION: Moderate stenosis of the bilateral vertebral artery   distal V4 segments.   No significant stenosis of the basilar or right   posterior cerebral arteries. The right posterior cerebral artery has fetal   origin. Severe stenosis of the distal V2 segment left vertebral artery. No   aneurysm. OTHER: No dural venous sinus thrombosis on this non-dedicated study. Posterior scalp swelling compatible with contusion. BRAIN: No mass effect or midline shift. No extra-axial fluid collection. The   gray-white differentiation is maintained. Impression   1. No large vessel occlusion of the intracranial arteries. 2. No intracranial aneurysm or other finding to suggest etiology of patient's   known subarachnoid hemorrhage. 3. Severe stenosis of the P2 segment left posterior cerebral artery. 4. Moderate stenosis of the distal V4 segments bilateral vertebral arteries. 5. Moderate stenosis of the bilateral common carotid arteries. No   significant internal carotid artery stenosis. CT cervical spine:  Vertebrae:  Mild multilevel degenerative joint disease within the facets. No   acute fracture. Discs/spinal canal/neural foramina:  No acute findings. No spinal canal   stenosis. Soft tissues:  No acute findings. Impression       Mild multilevel degenerative joint disease within the facets. No evidence of   fracture or malalignment. Labs:  Recent Labs     09/19/22  1600   WBC 8.4   HGB 12.7*   HCT 37.3*          Recent Labs     09/19/22  1600      K 4.1      CO2 21   BUN 20   CREATININE 1.3   GLUCOSE 79   CALCIUM 9.5       No results for input(s): PROTIME, INR, APTT in the last 72 hours.     Patient Active Problem List    Diagnosis Date Noted    Subarachnoid hemorrhage ([address] 09/20/2022    Abrasion of right hand 02/24/2022    Rotator cuff tendinitis, left 02/24/2022    Chronic left shoulder pain 02/21/2020    Trigger middle finger of right hand 02/21/2020    Type 2 diabetes mellitus without complication, without long-term current use of insulin ([address] 02/21/2020       Assessment:  Patient is a 58 y.o. male w/ PMH of DM and HTN who presented to the ED with altered mental status from a fall. He was found to have a small SAH and SDH on CT scan. Repeat CT scan at 6 hr showed no progression. CTA shows stenosis but no aneurysm. Plan:  No emergent neurosurgical intervention indicated  - Floor: Q4H  For change in exam MUST contact neurosurgery team along with critical care or primary team  SDH:  - CT Head w/o contrast 6 hours from previous scan is stable with no progression No further imaging unless there is a decline in neurologic status  - Maintain SBP <160; If PRN med insufficient, then may start Nicardipine infusion  - Keep Plt >100k & INR <1.4  - Hold all full dose anticoagulation & antiplatelet for 2 weeks  - SCDs for DVT prophylaxis  Pain: Managed by medical team  Speech consulted for swallow eval  Recommended regular texture diet with thin liquids  PT/OT consulted, appreciate recs  Advance diet / activity per primary team  Appreciate critical care team assistance in management  Thank you for consult. Neurosurgery plans to sign off since CT scan is stable with no new progression. Please call with any questions or decline in neurological status    DISPO: Neurosurgery to sign off. Dispo timing to be determined by primary team once patient is medically stable for discharge. Patient was seen and examined with Dr. Camden Penn who agrees with above assessment and plan.      Electronically signed by: KANDACE Andrews CNP, APRN-CNP, 9/20/2022 2:28 PM  [phone]

## 2022-09-20 NOTE — LETTER
[unfilled] [facility] Man Nicholas Jones to the emergency department on 9/19/2022. They may return to work on [employer]. [unfilled]    If you have any questions or concerns, please don't hesitate to call.       [unfilled]

## 2022-09-20 NOTE — CONSULTS
Chart reviewed, events noted, and I have personally performed a face-to-face diagnostic evaluation on this patient. I have personally reviewed CNP's note and confirmed the documented past medical history, family history, social history, allergies, home medications, review of systems, vital signs, laboratory values, and hospital medications via my own chart review, in person with the patient, and/or in person with his family members as appropriate. My findings are as follows:    Assessment  - 65yo man with multiple episodes of lightheadedness, darkening of vision, and need to sit down presents with a fall after one such episode during which he struck his head and sustained small SAH with small SDH.   - Feel that etiology of these lightheaded episodes is likely hypotension (BP since admission has been in systolics 05K to [id number])  - He apparently has been on antihypertensives at home and these may need adjusting    Recommendations  - OK for q4 neuro checks (and transfer from ICU)  - No antiplatelets/anticoagulants for two weeks  - SBP < 160 but AVOID HYPOTENSION  - Needs hypotension addressed as per Primary Team  - [facility] note, to which this attestation is attached, contains remainder of assessment details & recommendations. History of Present Illness:  65yo man with HTN and DM. Presented to ER after a fall yesterday. As documented elsewhere he was outside and the next thing he knew he was in the hospital. The circumstances of the fall are unclear but he has been having episodes over the last year of a \"dizzy\" feeling where he feel lightheaded, his vision begins to go black, and he needs to sit down in order to feel better. Yesterday, he had been complaining of similar symptoms and needed to sit down. This was prior to the fall and family assumes that is what happened to cause him to fall. Although he does not remember it clearly, family states that they found him sitting on the ground.  He was dazed and not quite himself and he had a large lump on his head. In the ER his BP was 104/78. A head CT revealed right medial anterior parietal lobe SAH with some minimal associated SDH along the anterior falx. CTA was done which revealed no aneurysm. There was severe stenosis of the left P2 segment and moderate stenosis of his bilateral distal V4 segments and bilateral carotids. Currently, he has some mild headache in the region where he hit his head and otherwise feels to his baseline. he is unsure what would have precipitated these symptoms, other than the above. he feels that nothing makes them better and nothing makes them worse. he rates the symptoms as severe. he denies any other associated symptoms including no other HA, no speech/language difficulties, no swallowing difficulties, no visual changes, no diplopia, no hearing loss, no tinnitus, no vertigo, no imbalance, no other light-headedness, no focal weakness, no sensory changes, no nausea or vomiting. he has had this problem before. There is no history of this problem or anything similar in his family members.      Neurological Exam (performed on 9/20/2022 at 1240):  -Mental status: A&O x3; pleasant & appropriate  -Memory: Recent & remote memory intact  -Attention: Normal  -Fund of Knowledge: Good  -Speech & Language: no aphasia; no dysarthria  -Cranial nerves: pupils 4mm->3mm bilaterally; fields intact; unable to visualize fundi; EOMI, no nystagmus; sensation intact V1, V2, V3; no facial asymmetry; hearing intact bilaterally; palate elevates symmetrically; SCMs & trapezii intact bilaterally; tongue midline  -Sensory: intact to lt touch throughout  -Motor:   RUE: 5/5, no pronator drift  RLE: 5/5  LUE: 5/5, no pronator drift  LLE: 5/5  -Tone: Normal throughout  -Reflexes: 1+ & symmetric throughout  -Coordination: FNF intact  -Gait & Station: deferred for pt safety  -Other: no adventitious movements noted  Other Systems  -General Appearance: well-developed, well-nourished, no apparent distress  -Neck: supple  -Lungs: breathing unlabored, regular, no audible wheezes  -CV: pulses strong x4 extremities  -Abd: flat  -Extrem: no c/c/e      Imaging & Testing:  CT HEAD WO CONTRAST  Images independently reviewed. Agree with findings. --KACHORIS    Area of subarachnoid hemorrhage seen involving cortical sulci along the medial right frontal lobe appears stable with small associated subdural bleed also suspected which likewise appears essentially stable from prior exam.        Discussed with pt; pt's family; nursing; and Jorja Severance, CNP    Time independently spent reviewing chart and prior testing, obtaining history from patient, examining patient, ordering appropriate medications / diagnostics, reviewing results of diagnostics, counseling and educating patient / family, communicating plan with other providers and documenting clinical information in the EMR was approximately 50 minutes. Tiffany Guerra MD  Neurology  [phone]  9/20/2022              Neurology / Neurocritical Care Consult Note    [facility], MD is requesting this consult. Reason for Consult: SAH with [facility]  Admission Chief Complaint: fall    History of Present Illness     Man Cintia Emerson is a 58 y.o. y/o male with PMH significant for HTN and DM. Per my interview with the patient, he fell outside of his house yesterday. He reports he remembers falling, but it all happened very quickly. The next thing he remembers he was lying in a hospital bed. His family at bedside reports that for the past year the patient has been experiencing dizziness and has had episodes where his vision starts to go black and he needs to sit down, and they believe he got dizzy yesterday because he needed to sit down after the event. After the incident yesterday the patient was found to be sitting down and confused with a large lump on his head.  He is currently reporting a headache where he hit his head, has some L eye blurriness and burning pain in his feet but the vision changes and pain have been present for 4 - 5 months. CT head in the ER showed hyperdensity within the sulci of the medial anterior R parietal lobe consistent with SAH and minimal SDH along the anterior falx in the same area. His blood pressure on arrival to the ER was 104/78. No evidence of fracture or malalignment on CT cervical spine. Neurosurgery was consulted who recommended CTA to evaluate for aneurysm. CTA revealed no aneurysm or large vessel occlusion but did demonstrate severe stenosis of the P2 segment of the L PCA, moderate stenosis of the distal V4 segments of the bilateral vertebral arteries, and moderate stenosis of the bilateral carotid arteries. He is unsure of what medications he takes at home but per chart review he is on ASA. REVIEW OF SYSTEMS:   Constitutional- No weight loss or fevers   Eyes- No diplopia. No photophobia. +blurred vision L eye  Ears/nose/throat- No dysphagia. No Dysarthria   Cardiovascular- No palpitations. No chest pain   Respiratory- No dyspnea. No Cough   Gastrointestinal- No Abdominal pain. No Vomiting. Genitourinary- No incontinence. No urinary retention   Musculoskeletal- No myalgia. No arthralgia   Skin- No rash. No easy bruising. Psychiatric- No depression. No anxiety   Endocrine- No diabetes. No thyroid issues. Hematologic- No bleeding difficulty. No fatigue   Neurologic- +burning pain in bilateral feet. +dizziness    Past Medical, Surgical, Family, and Social History   PAST MEDICAL HISTORY:  Past Medical History:   Diagnosis Date    Diabetes mellitus ([address]     Hypertension      SURGICAL HISTORY:  Past Surgical History:   Procedure Laterality Date    FINGER TRIGGER RELEASE Right 3/3/2022    RIGHT LONG FINGER TRIGGER FINGER RELEASE performed by Alexander Vernon MD at [facility] HISTORY:  Family history non-contributory  No family history on file.   Social History     Tobacco Use Smoking status: Every Day    Smokeless tobacco: Never   Vaping Use    Vaping Use: Never used   Substance Use Topics    Alcohol use: No    Drug use: No         Allergies & Outpatient Medications   ALLERGIES:  No Known Allergies  HOME MEDICATIONS:  Current Discharge Medication List        CONTINUE these medications which have NOT CHANGED    Details   aspirin 81 MG chewable tablet 81 mg      atorvastatin (LIPITOR) 80 MG tablet       lisinopril-hydroCHLOROthiazide (PRINZIDE;ZESTORETIC) 10-12.5 MG per tablet       meloxicam (MOBIC) 7.5 MG tablet TAKE 1 TABLET BY MOUTH EVERY DAY      tamsulosin (FLOMAX) 0.4 MG capsule Take 1 capsule by mouth daily  Qty: 90 capsule, Refills: 1      hydroCHLOROthiazide (HYDRODIURIL) 25 MG tablet Take 1 tablet by mouth daily for 30 doses  Qty: 30 tablet, Refills: 0      sodium chloride (OCEAN) 0.65 % nasal spray 1 spray by Nasal route as needed for Congestion  Qty: 1 Bottle, Refills: 0      metFORMIN (GLUCOPHAGE) 500 MG tablet Take 500 mg by mouth 2 times daily (with meals)           STOP taking these medications       ibuprofen (ADVIL;MOTRIN) 600 MG tablet Comments:   Reason for Stopping:         naproxen (NAPROSYN) 500 MG tablet Comments:   Reason for Stopping:                 Physical Exam   PHYSICAL EXAM:  Vitals:    09/20/22 0645 09/20/22 0700 09/20/22 0715 09/20/22 0800   BP: 109/82 103/80 105/76 101/75   Pulse: 68 70 77 66   Resp:    18   Temp:    98.9 °F (37.2 °C)   TempSrc:    Oral   SpO2: 94% 94% 95% 95%   Weight:             General: Alert, no distress, well-nourished  Neurologic  Mental status:   orientation to person, place, time, situation   Attention intact as able to attend well to the exam     Language fluent in conversation   Comprehension intact; follows simple commands    Cranial nerves:   CN2: Visual fields full w/o extinction on confrontational testing   CN 3,4,6: Pupils equal and reactive to light, extraocular muscles intact  CN5: Facial sensation symmetric   CN7: Face symmetric  CN8: Hearing symmetric to spoken voice  CN9: Palate elevated symmetrically  CN11: Traps full strength on shoulder shrug  CN12: Tongue midline with protrusion    Motor Exam:   R  L    Deltoid 5  5   Biceps 5 5   Triceps 5 5   Wrist extension  5 5   Interossei 5 5      R  L    Hip flexion  5  5   Hip extension  5 5   Knee flexion  5 5   Knee extension  5 5   Ankle dorsiflexion  5 5   Ankle plantar flexion  5 5       Sensory: light touch intact and symmetric in all 4 extremities. No sensory extinction on bilateral simultaneous stimulation  Cerebellar/coordination: finger nose finger normal without ataxia  Tone: normal in all 4 extremities  Gait: held for patient safety    OTHER SYSTEMS:  Cardiovascular: Warm, appears well perfused   Respiratory: Easy, non-labored respiratory pattern   Abdominal: Abdomen is without distention   Extremities: Upper and lower extremities are atraumatic in appearance without deformity. No swelling or erythema. Diagnostic Testing Results   IMAGES:  Images personally reviewed and agree w/ radiology interpretation. Head CT w/o Contrast:  Impression       1. Hyperdensity within the sulci of the medial anterior right parietal lobe   near the frontal parietal junction is consistent with subarachnoid   blood/hemorrhage. There also is minimal subdural blood/hematoma along the   anterior falx in the same area. 2.  No definite underlying parenchymal brain abnormality is noted otherwise. 3.  Posterior scalp hematoma. No evidence of skull fracture. CTA of Head / Neck w/ Contrast:  Impression   1. No large vessel occlusion of the intracranial arteries. 2. No intracranial aneurysm or other finding to suggest etiology of patient's   known subarachnoid hemorrhage. 3. Severe stenosis of the P2 segment left posterior cerebral artery. 4. Moderate stenosis of the distal V4 segments bilateral vertebral arteries.    5. Moderate stenosis of the bilateral common carotid arteries. No   significant internal carotid artery stenosis. CT Cervical Spine:  Impression       Mild multilevel degenerative joint disease within the facets. No evidence of   fracture or malalignment. LABS:  All results below personally reviewed. Pertinent positives & negatives are addressed in Impression & Recommendations below. LABS   Metabolic Panel Recent Labs     09/19/22  1600      K 4.1      CO2 21   BUN 20   CREATININE 1.3   GLUCOSE 79   CALCIUM 9.5   LABALBU 4.5   ALKPHOS 117   ALT 25   AST 28      CBC / Coags Recent Labs     09/19/22  1600   WBC 8.4   RBC 4.25   HGB 12.7*   HCT 37.3*         Other Recent Labs     09/19/22  1427   COVID19 Not Detected     No results for input(s): PHENYTOIN, KEPPRA, LACOSA, LAMO, VALPROATE, LACTSEPSIS, LACTA in the last 72 hours. CURRENT SCHEDULED MEDICATIONS   Inpatient Medications     atorvastatin, 80 mg, Oral, Nightly    lisinopril-hydroCHLOROthiazide, 1 tablet, Oral, Daily    tamsulosin, 0.4 mg, Oral, Daily    insulin lispro, 0-4 Units, SubCUTAneous, TID WC    insulin lispro, 0-4 Units, SubCUTAneous, Nightly   Infusions    dextrose        Antibiotics   Recent Abx Admin                     cefTRIAXone (ROCEPHIN) 1000 mg in sterile water 10 mL IV syringe (mg) 1,000 mg Given 09/19/22 1840                       IMPRESSION & RECOMMENDATIONS     IMPRESSION:  Jose Alejandro Sanchez is a 58 y.o. y/o male with PMH significant for HTN and DM who presents after a fall. Was found to have small SAH and SDH on CT head. CTA without evidence for aneurysm but did demonstrate some stenosis, given CTA results SAH and SDH likely due to trauma. Has been having episodes of dizziness for about a year.      RECOMMENDATIONS:  - Neuro checks: Q4 hours  - Repeat head CT reviewed, bleed stable  - Avoid antiplatelets and full dose anticoagulation x 2 weeks  - SBP goal < 160  - May benefit from cardiac work up and evaluation of home blood pressure medications given his reported history of what sounds like pre syncopal episodes  - Activity / Diet as tolerates  - PT/OT for dizziness / imbalance  - SLP for cognitive evaluation   - Headaches: Tylenol 650mg PO Q6H PRN  - Dizziness: Meclizine 25mg PO TID PRN  - Nausea: Zofran 4mg IV Q6H PRN. Consider scopolamine patch for persistent nausea  - Will require 24 hour supervision for first 48 hours after discharge  - Mild TBI education prior to discharge         KANDACE Steinberg - [place]   Neurology & Neurocritical Care   Neurology Line: [phone]  [employer]: [facility] NPs  9/20/2022 8:55 AM    Time independently spent by Nurse Practitioner reviewing chart and prior testing, obtaining history from patient, examining patient, ordering appropriate medications / diagnostics, reviewing results of diagnostics, counseling and educating patient / family, communicating plan with other providers and documenting clinical information in the EMR was approximately 40 minutes.

## 2022-09-20 NOTE — H&P
ICU HISTORY AND PHYSICAL       Hospital Day: 2  ICU Day: 1                                                         Code:No Order  Admit Date: 9/19/2022  PCP: KANDACE Reinoso CNP                                  CC: Fall, Subarachnoid hemorrhage    HISTORY OF PRESENT ILLNESS:   This is a 58 y.o. male with pertinent past medical history of diabetes, hypertension who was brought in by EMS transportation for altered mental status. Patient present with family who act as additional historians, states the patient came into the house about 3 hour ago with a large lump on the back of his head. On presentation, he seemed disoriented and did not recall if or how he fell. Patient reports diffuse headache, moderate in severity, nothing makes it any better or worse it was associated with mild blurry vision in his left eye which is chronic from 2-3 weeks ago, LUE weakness which is chronic also and left-sided neck pain that was relatively new. He does remember falling and then blacking out and 3 hours later waking up in a bed. He denies any prodromal symptoms like lightheadedness, racing of heart or seizure-like activity during the incident. He denies any fevers, chills, chest pain, shortness of breath, extremity weakness or numbness. No nausea or vomiting. Takes daily 81 mg aspirin, no other anticoagulation. At home he is also on lisinopril. In ED, he was alert and was protecting his airways. On physical exam he had no neurological deficits but did seem disoriented. His EKG was WNL. CT scan was significant for subarachnoid hemorrhage. CT cervical spine was WNL.   Vitals were WNL      PAST HISTORY:     Past Medical History:   Diagnosis Date    Diabetes mellitus ([facility] [address]     Hypertension        Past Surgical History:   Procedure Laterality Date    FINGER TRIGGER RELEASE Right 3/3/2022    RIGHT LONG FINGER TRIGGER FINGER RELEASE performed by Hanane Joshi MD at [facility]       SocialHistory:   The patient lives noted, overlying skin intact, no bleeding. No septal or auricular hematoma. No raccoon eyes or duncan sign. Ears: External ears normal.      Nose: Nose normal.     Mouth: Membrane mucosa moist and pink. Eyes: Anicteric sclera. No discharge. PERRL, no nystagmus, normal test of skew. Visual fields intact by confrontation. Neck: Supple. Trachea midline. Tenderness palpation overlying left cervical paraspinal musculature. No focal midline tenderness. Cardiovascular: RRR; no murmurs, rubs, or gallops. Pulmonary/Chest: Effort normal. No respiratory distress. CTAB. No stridor. No wheezes. No rales. Abdominal: Soft. No distension. Nontender to deep palpation all quadrants. Musculoskeletal: Moves all extremities. No gross deformity. Neurological: Blurry vision in the left eye, baseline weakness in left UE, Motor 5/5 in all extremities , sensations intact,   Alert, oriented to person and place. Not oriented to month or year. Face symmetric. Speech is clear. CN 2-12 intact. 5/5 motor and sensation grossly intact all extremities. No pronator drift. Normal finger to nose, normal heel to shin. Gait not tested. Skin: Warm and dry. No rash. Psychiatric: Normal mood and affect. Behavior is normal.      No results for input(s): PHART, DWX5ROD, PO2ART in the last 72 hours. DATA:       Labs:  CBC:   Recent Labs     09/19/22  1600   WBC 8.4   HGB 12.7*   HCT 37.3*          BMP:   Recent Labs     09/19/22  1600      K 4.1      CO2 21   BUN 20   CREATININE 1.3   GLUCOSE 79     LFT's:   Recent Labs     09/19/22  1600   AST 28   ALT 25   BILITOT 0.5   ALKPHOS 117     Troponin:   Recent Labs     09/19/22  1600   TROPONINI <0.01     BNP:No results for input(s): BNP in the last 72 hours. ABGs: No results for input(s): PHART, BBS5NLG, PO2ART in the last 72 hours. INR: No results for input(s): INR in the last 72 hours.     U/A:  Recent Labs     09/19/22  1620   COLORU Yellow   PHUR 5.5   WBCUA 27* RBCUA 0   BACTERIA 4+*   CLARITYU Clear   SPECGRAV 1.020   LEUKOCYTESUR MODERATE*   UROBILINOGEN 0.2   BILIRUBINUR Negative   BLOODU Negative   GLUCOSEU Negative       CTA HEAD NECK W CONTRAST   Final Result   1. No large vessel occlusion of the intracranial arteries. 2. No intracranial aneurysm or other finding to suggest etiology of patient's   known subarachnoid hemorrhage. 3. Severe stenosis of the P2 segment left posterior cerebral artery. 4. Moderate stenosis of the distal V4 segments bilateral vertebral arteries. 5. Moderate stenosis of the bilateral common carotid arteries. No   significant internal carotid artery stenosis. CT CSpine W/O Contrast   Final Result      Mild multilevel degenerative joint disease within the facets. No evidence of   fracture or malalignment. CT Head W/O Contrast   Final Result      1. Hyperdensity within the sulci of the medial anterior right parietal lobe   near the frontal parietal junction is consistent with subarachnoid   blood/hemorrhage. There also is minimal subdural blood/hematoma along the   anterior falx in the same area. 2.  No definite underlying parenchymal brain abnormality is noted otherwise. 3.  Posterior scalp hematoma. No evidence of skull fracture. These findings were discussed with DR. Shila MENDEZ by telephone at 17:55   on 9/19/2022. XR CHEST PORTABLE   Final Result   No acute cardiopulmonary findings. EKG: NSR      ASSESSMENT AND PLAN:   Man Xiao Bills is a 58 y.o. male with a PMH of HTN, T2DM who was taken to [facility] for [address] [place] [address] after a presumed fall. Unwitnessed fall 2/2 Vasovagal syncope  TBI 2/2 Subarachnoid Hemorrhage, Subdural Hemorrhage  Fall in parking lot, CT Head with SAH. CTA negative for aneurysm. SAH most likely secondary to fall.   -HOB  -On Atorvastatin, holding Aspirin  -q4 neurochecks  -SBP<160  -PT/OT eval  -SLP  -HBA1C ordered awaiting results  -Repeat CT ordered stable finding  -No reversal of antiplatelets or anticoagulation required  -PLT>100K  -Hold anticoagulations and antiplatelets for 2 weeks  -Neurosrgery and neurology consulted  -Headaches: Tylenol 650mg PO Q6H PRN  - Dizziness: Meclizine 25mg PO TID PRN  - Nausea: Zofran 4mg IV Q6H PRN.  Consider scopolamine patch for persistent nausea  -Orthostatic hypotension ordered    Chronic conditions:  HTN  -On HCTZ-Lisinopril at home-held for hypotension    T2DM  On metformin at home   -On SSI here    BPH  -On Flomax    Code Status: Full Code  FEN: NPO  PPX:  SCD  DISPO: ICU    This patient has been staffed and discussed with    -----------------------------  Phil Sanders MD, PGY-1  9/19/2022  10:52 PM

## 2022-09-20 NOTE — ED NOTES
Pts blood pressure lower, in the 80s.  Notified MD, said MAP of 60 is goal, pt is achieving that goal     Kobe Acevedo RN  09/20/22 4312

## 2022-09-21 VITALS
WEIGHT: 151.24 LBS | DIASTOLIC BLOOD PRESSURE: 93 MMHG | TEMPERATURE: 98 F | HEART RATE: 65 BPM | RESPIRATION RATE: 16 BRPM | SYSTOLIC BLOOD PRESSURE: 120 MMHG | BODY MASS INDEX: 26.79 KG/M2 | OXYGEN SATURATION: 95 %

## 2022-09-21 LAB
ALBUMIN SERPL-MCNC: 4.3 G/DL (ref 3.4–5)
ANION GAP SERPL CALCULATED.3IONS-SCNC: 13 MMOL/L (ref 3–16)
BUN BLDV-MCNC: 18 MG/DL (ref 7–20)
CALCIUM SERPL-MCNC: 8.7 MG/DL (ref 8.3–10.6)
CHLORIDE BLD-SCNC: 104 MMOL/L (ref 99–110)
CO2: 21 MMOL/L (ref 21–32)
CREAT SERPL-MCNC: 1.1 MG/DL (ref 0.8–1.3)
GFR AFRICAN AMERICAN: >60
GFR NON-AFRICAN AMERICAN: >60
GLUCOSE BLD-MCNC: 109 MG/DL (ref 70–99)
GLUCOSE BLD-MCNC: 122 MG/DL (ref 70–99)
GLUCOSE BLD-MCNC: 158 MG/DL (ref 70–99)
GLUCOSE BLD-MCNC: 85 MG/DL (ref 70–99)
HCT VFR BLD CALC: 37.2 % (ref 40.5–52.5)
HEMOGLOBIN: 12.5 G/DL (ref 13.5–17.5)
LV EF: 53 %
LVEF MODALITY: NORMAL
MAGNESIUM: 2.2 MG/DL (ref 1.8–2.4)
MCH RBC QN AUTO: 29.3 PG (ref 26–34)
MCHC RBC AUTO-ENTMCNC: 33.6 G/DL (ref 31–36)
MCV RBC AUTO: 87.2 FL (ref 80–100)
ORGANISM: ABNORMAL
PDW BLD-RTO: 14 % (ref 12.4–15.4)
PERFORMED ON: ABNORMAL
PERFORMED ON: ABNORMAL
PERFORMED ON: NORMAL
PHOSPHORUS: 3.4 MG/DL (ref 2.5–4.9)
PLATELET # BLD: 221 K/UL (ref 135–450)
PMV BLD AUTO: 8.4 FL (ref 5–10.5)
POTASSIUM SERPL-SCNC: 4.4 MMOL/L (ref 3.5–5.1)
RBC # BLD: 4.27 M/UL (ref 4.2–5.9)
SODIUM BLD-SCNC: 138 MMOL/L (ref 136–145)
URINE CULTURE, ROUTINE: ABNORMAL
WBC # BLD: 7 K/UL (ref 4–11)

## 2022-09-21 PROCEDURE — 97530 THERAPEUTIC ACTIVITIES: CPT

## 2022-09-21 PROCEDURE — 97116 GAIT TRAINING THERAPY: CPT

## 2022-09-21 PROCEDURE — 97161 PT EVAL LOW COMPLEX 20 MIN: CPT

## 2022-09-21 PROCEDURE — 85027 COMPLETE CBC AUTOMATED: CPT

## 2022-09-21 PROCEDURE — 93242 EXT ECG>48HR<7D RECORDING: CPT | Performed by: INTERNAL MEDICINE

## 2022-09-21 PROCEDURE — 83036 HEMOGLOBIN GLYCOSYLATED A1C: CPT

## 2022-09-21 PROCEDURE — 93306 TTE W/DOPPLER COMPLETE: CPT

## 2022-09-21 PROCEDURE — 83735 ASSAY OF MAGNESIUM: CPT

## 2022-09-21 PROCEDURE — 36415 COLL VENOUS BLD VENIPUNCTURE: CPT

## 2022-09-21 PROCEDURE — 6370000000 HC RX 637 (ALT 250 FOR IP): Performed by: INTERNAL MEDICINE

## 2022-09-21 PROCEDURE — 6370000000 HC RX 637 (ALT 250 FOR IP): Performed by: STUDENT IN AN ORGANIZED HEALTH CARE EDUCATION/TRAINING PROGRAM

## 2022-09-21 PROCEDURE — 97535 SELF CARE MNGMENT TRAINING: CPT

## 2022-09-21 PROCEDURE — 97165 OT EVAL LOW COMPLEX 30 MIN: CPT

## 2022-09-21 PROCEDURE — 80069 RENAL FUNCTION PANEL: CPT

## 2022-09-21 RX ORDER — CEPHALEXIN 500 MG/1
500 CAPSULE ORAL EVERY 6 HOURS SCHEDULED
Status: DISCONTINUED | OUTPATIENT
Start: 2022-09-21 | End: 2022-09-21 | Stop reason: HOSPADM

## 2022-09-21 RX ORDER — CEPHALEXIN 500 MG/1
500 CAPSULE ORAL 4 TIMES DAILY
Qty: 28 CAPSULE | Refills: 0 | Status: SHIPPED | OUTPATIENT
Start: 2022-09-21 | End: 2022-09-28

## 2022-09-21 RX ADMIN — TAMSULOSIN HYDROCHLORIDE 0.4 MG: 0.4 CAPSULE ORAL at 08:56

## 2022-09-21 RX ADMIN — CEPHALEXIN 500 MG: 500 CAPSULE ORAL at 12:03

## 2022-09-21 RX ADMIN — ACETAMINOPHEN 650 MG: 325 TABLET, FILM COATED ORAL at 08:56

## 2022-09-21 ASSESSMENT — PAIN SCALES - WONG BAKER
WONGBAKER_NUMERICALRESPONSE: 2

## 2022-09-21 ASSESSMENT — PAIN DESCRIPTION - LOCATION
LOCATION: HEAD
LOCATION: HEAD

## 2022-09-21 ASSESSMENT — PAIN DESCRIPTION - ORIENTATION
ORIENTATION: MID;POSTERIOR
ORIENTATION: POSTERIOR

## 2022-09-21 ASSESSMENT — PAIN - FUNCTIONAL ASSESSMENT: PAIN_FUNCTIONAL_ASSESSMENT: ACTIVITIES ARE NOT PREVENTED

## 2022-09-21 ASSESSMENT — PAIN DESCRIPTION - DESCRIPTORS
DESCRIPTORS: ACHING
DESCRIPTORS: ACHING

## 2022-09-21 ASSESSMENT — PAIN SCALES - GENERAL
PAINLEVEL_OUTOF10: 2
PAINLEVEL_OUTOF10: 0
PAINLEVEL_OUTOF10: 2

## 2022-09-21 NOTE — DISCHARGE SUMMARY
INTERNAL MEDICINE DEPARTMENT AT [address] [place] [address]  DISCHARGE SUMMARY    Patient ID: [id number]                                             Discharge Date: 9/21/2022   Patient's PCP: KANDACE Kelly CNP                                          Discharge Physician: Isabel Greman MD  Admit Date: 9/20/2022   Admitting Physician: Salima Oneill MD    PROBLEMS DURING HOSPITALIZATION:  Present on Admission:   Subarachnoid hemorrhage ([facility] [address]      DISCHARGE DIAGNOSES: Sub arachnoid Hemorrhage secondary to mechanical fall caused by syncope vs hypotension    HPI:  Mr. Dayan Holliday is a 60-year-old man with past medical history of diabetes and hypertension who was brought to the hospital via EMS due to altered mental status. Patient presented with his family who were able to act as additional historians as the patient speaks in a police. Family states that the patient came home a few hours before presenting to the hospital with a large lump on the back of his head. Patient told them that he did not recall how this happened and seemed disoriented. The patient did report a diffuse headache which was moderate in severity with no alleviating or exacerbating factors. Patient stated as well that he had some mild blurry vision in his left eye but that has been going on for a couple of weeks. Patient also has left upper extremity weakness which he has been dealing with chronically. Patient also has some left-sided neck pain which could be secondary to the fall. Patient stated that he did remember falling and then blacking out. Patient denied any prodromal symptoms like lightheadedness, palpitations, or seizure-like activity before or during the incident. Patient also denied fevers, chills, chest pain, shortness of breath, extremity weakness or numbness additional to above history. Patient denies nausea or vomiting. Patient takes a daily 81 mg aspirin. Patient does not take any other anticoagulation.   Patient also takes lisinopril at home for his hypertension. In the emergency department, the patient was alert and was able to maintain his own airway. Vital signs were stable upon presentation. On physical exam, patient had no focal neurologic deficits but did seem grossly disoriented. EKG was within normal limits. CT scan obtained in the ED was significant for a subarachnoid hemorrhage. CT of his cervical spine as well was within normal limits. Neurosurgery was consulted. Repeat CT scan showed that the subarachnoid hemorrhage was stable. As such, neurosurgery determined that there was no indication for surgical intervention at that time. They signed off from the patient. Patient was admitted to the ICU for management of all of the above issues. Patient's condition stabilized over the course of 2 days and he was transferred to the general medical floors. The following issues were addressed during hospitalization:    Unwitnessed fall likely secondary to vasovagal syncope versus hypotension:  The patient presented to the hospital after having an unwitnessed fall which caused him to strike the back of his head. It is unclear what the etiology of his fall was. It is believed that his blood pressure may have been too tightly controlled and that could have contributed to his fall. Patient's blood glucose is also been running on the low side so he could have had an episode of hypoglycemia which contributed to the fall. Patient had an echocardiogram while inpatient which did not reveal any structural abnormalities for the heart or demonstrate any obvious causes for syncope. The patient's antihypertensive medications were held while he was an inpatient. Patient will be asked to wear a cardiac Holter monitor for 14 days after discharge for monitoring for any further cardiac events. The patient will not be restarted on his at home lisinopril/hydrochlorothiazide after discharge.   We have spoken to the patient and suggest that he follow-up with his primary care physician within 1 week for medication reconciliation and to make any long-term changes to his medication regimen. Traumatic brain injury secondary to subarachnoid hemorrhage  The patient's unwitnessed fall precipitated a subarachnoid hemorrhage which led to a traumatic brain injury. While the patient was here in the hospital, he had every 4 hours neurochecks. His anticoagulation medication including aspirin was held while he was here. The patient's blood pressure was maintained below 160. He was seen by both neurology and neurosurgery. Due to the stability and small size of the subarachnoid hemorrhage, neurosurgery did not feel that there is any surgical indication warranted during this admission. Patient was seen by PT and OT as well as SLP and they all determined that he was okay to be discharged. Symptomatic control of headaches, dizziness, and nausea was achieved while the patient was here with p.o. medications. Chronic medical conditions  The patient's hypertension which is controlled with lisinopril/hydrochlorothiazide as an outpatient was not reordered while he was here due to his low pressures. The patient's type 2 diabetes for which she takes metformin at home was managed using a sliding scale insulin while he was here.   The patient's benign prostatic hyperplasia was managed with at home tamsulosin dose while he was in the hospital.    Physical Exam:  BP (!) 120/93   Pulse 65   Temp 98.4 °F (36.9 °C) (Oral)   Resp 16   Wt 151 lb 3.8 oz (68.6 kg)   SpO2 95%   BMI 26.79 kg/m²       Consults: Cardiology, neurology, neurosurgery, intensivist.  Significant Diagnostic Studies: CT scan of the head without contrast with repeat CT without contrast.  CT spine without contrast  Treatments: IV hydration, blood pressure management, neurochecks, Imaging  Disposition: home  Discharged Condition: Stable  Follow Up: Primary Care Physician in one week.    DISCHARGE MEDICATION:     Medication List        START taking these medications      cephALEXin 500 MG capsule  Commonly known as: KEFLEX  Take 1 capsule by mouth 4 times daily for 7 days            CONTINUE taking these medications      acetaminophen 500 MG tablet  Commonly known as: TYLENOL     atorvastatin 80 MG tablet  Commonly known as: LIPITOR     Clear Eyes for Dry Eyes 1-0.25 % Soln  Generic drug: Carboxymethylcellul-Glycerin     metFORMIN 500 MG tablet  Commonly known as: GLUCOPHAGE     tamsulosin 0.4 MG capsule  Commonly known as: FLOMAX  Take 1 capsule by mouth daily            STOP taking these medications      aspirin 81 MG chewable tablet     ibuprofen 600 MG tablet  Commonly known as: ADVIL;MOTRIN     lisinopril-hydroCHLOROthiazide 10-12.5 MG per tablet  Commonly known as: PRINZIDE;ZESTORETIC     naproxen 500 MG tablet  Commonly known as: Naprosyn               Where to Get Your Medications        These medications were sent to [place], [facility] [address] Dustin Isbell [phone] - F [phone]  [address] [facility] [address]      Phone: [phone]   cephALEXin 500 MG capsule       Activity: activity as tolerated  Diet: regular diet  Wound Care: none needed    Time Spent on discharge is more than 30 minutes    Cecile Knapp MD  PGY1, Internal Medicine  09/21/22  2:36 PM

## 2022-09-21 NOTE — ED NOTES
Urine culture results (>100k E coli) forwarded to Malini Miguel pharmacist Akua Thomas, PharmD, BCCCP

## 2022-09-21 NOTE — DISCHARGE INSTR - MEDS
Please do not resume taking your lisinopril/hydrochlorothiazide once you have been discharges. Follow up with your primary care provider within 1 week for further recommendations about hypertension management.

## 2022-09-21 NOTE — CONSULTS
NEUROSURGERY CONSULT NOTE    PEDRO Santiago  [id number]   1960 9/20/2022    Requesting physician: Herlinda Patterson MD    Reason for consultation: [address] and SDH    History of present illness: Patient is a 58 y.o. male w/ PMH of DM and HTN who takes ASA, who presented to the ED by EMS on 9/20/2022 for altered mental status. Family is at the bedside and they stated the patient came into the house around 2pm yesterday disoriented. He had a large lump on the back of his head. He initially told family that he did not fall. Family reports that around 5pm he remembered falling outside but remembers nothing else about the fall or how it occurred. Unknown LOC or height of the fall. In the ED CT showed a hyperdensity within the sulci of the medial anterior right parietal lobe concerning for SAH, and a minimal SDH along the anterior falx. Neurosurgery was consulted and CTA was recommended. The CTA showed no large vessel occulusion or aneurysm but did show severe stenosis of the P2 segment of the Left PCA, moderate stenosis of the distal V4 segments of the bilateral vertebral arteries, and moderate stenosis of the bilateral carotid arteries. ROS:   GENERAL:  Denies fever or recent illness. Denies weight changes   EYES:  Denies vision change or diplopia  CARDIAC:  Denies chest pain  RESPIRATORY:  Denies shortness of breath  NEURO:  Denies headache, no numbness or tingling or lateralizing weakness       No Known Allergies    Past Medical History:   Diagnosis Date    Diabetes mellitus ([address]     Hypertension         Past Surgical History:   Procedure Laterality Date    FINGER TRIGGER RELEASE Right 3/3/2022    RIGHT LONG FINGER TRIGGER FINGER RELEASE performed by Raheel Ernandez MD at [address] [place] [address] History    Not on file   Tobacco Use    Smoking status: Every Day    Smokeless tobacco: Never   Vaping Use    Vaping Use: Never used   Substance and Sexual Activity    Alcohol use:  No Drug use: No    Sexual activity: Not on file        No family history on file.      Outpatient Medications Marked as Taking for the 9/20/22 encounter [facility] Encounter)   Medication Sig Dispense Refill    acetaminophen (TYLENOL) 500 MG tablet Take 500 mg by mouth every 6 hours as needed for Pain      Carboxymethylcellul-Glycerin (CLEAR EYES FOR DRY EYES) 1-0.25 % SOLN Apply 1 drop to eye as needed          Current Facility-Administered Medications   Medication Dose Route Frequency Provider Last Rate Last Admin    atorvastatin (LIPITOR) tablet 80 mg  80 mg Oral Nightly Orestes Bojorquez MD   80 mg at 09/20/22 2057    [Held by provider] lisinopril-hydroCHLOROthiazide (PRINZIDE;ZESTORETIC) 10-12.5 MG per tablet 1 tablet  1 tablet Oral Daily Orestes Baptiste MD        tamsulosin (FLOMAX) capsule 0.4 mg  0.4 mg Oral Daily Orestes Baptiste MD   0.4 mg at 09/20/22 1003    sodium chloride (OCEAN, BABY AYR) 0.65 % nasal spray 1 spray  1 spray Nasal PRN Orestes Baptiste MD        insulin lispro (1 Unit Dial) 0-4 Units  0-4 Units SubCUTAneous TID WC Orestes Baptiste MD        insulin lispro (1 Unit Dial) 0-4 Units  0-4 Units SubCUTAneous Nightly Orestes Baptiste MD        glucose chewable tablet 16 g  4 tablet Oral PRN Orestes Baptiste MD        dextrose bolus 10% 125 mL  125 mL IntraVENous PRN Orestes Baptiste MD        Or    dextrose bolus 10% 250 mL  250 mL IntraVENous PRN Orestes Baptiste MD        glucagon (rDNA) injection 1 mg  1 mg SubCUTAneous PRN Orestes Baptiste MD        dextrose 10 % infusion   IntraVENous Continuous PRSHANNA Baptiste MD        acetaminophen (TYLENOL) tablet 650 mg  650 mg Oral Q4H PRN Orestes Baptiste MD   650 mg at 09/20/22 1203    ondansetron (ZOFRAN-ODT) disintegrating tablet 4 mg  4 mg Oral Q8H PRN Orestes Baptiste MD        Or    ondansetron (ZOFRAN) injection 4 mg  4 mg IntraVENous Q6H PRN Orestes Baptiste MD        labetalol (NORMODYNE;TRANDATE) injection 10 mg  10 mg IntraVENous Q10 Min CHAMP Kelly MD            Objective:  BP (!) 145/73   Pulse 77   Temp 98 °F (36.7 °C) (Oral)   Resp 16   Wt 151 lb 3.8 oz (68.6 kg)   SpO2 93%   BMI 26.79 kg/m²     Physical Exam:   Patient seen and examined  GCS:  4 - Opens eyes on own  5 - Alert and oriented  6 - Follows simple motor commands  General: Well developed. Alert and cooperative in no acute distress. HENT: atraumatic, neck supple  Eyes: Optic discs: Not tested  Pulmonary: unlabored respiratory effort  Cardiovascular:  Warm well perfused. No peripheral edema  Gastrointestinal: abdomen soft, NT, ND    Neurological:  Mental Status: Awake, alert, oriented x 4, speech clear and appropriate  Attention: Intact  Language: No aphasia or dysarthria noted  Sensation: Intact to all extremities to light touch  Coordination: Intact    Cranial Nerves:  II: Visual acuity not tested, denies new visual changes / diplopia  III, IV, VI: PERRL, 4 mm bilaterally, EOMI, no nystagmus noted  VII: Face symmetric  VIII: Hearing intact bilaterally to spoken voice  IX: Palate movement equal bilaterally    Musculoskeletal:   Gait: Not tested   Assist devices: None   Tone: normal  Motor strength: moving all extremities with no motor deficits    Radiological Findings:  I personally reviewed the patient's imaging which consists of a CT head dated 9/19/2022. This demonstrates SAH in the right frontal region with a small SDH. Repeat CT is stable. Labs:  Recent Labs     09/19/22  1600   WBC 8.4   HGB 12.7*   HCT 37.3*            Recent Labs     09/19/22  1600      K 4.1      CO2 21   BUN 20   CREATININE 1.3   GLUCOSE 79   CALCIUM 9.5         No results for input(s): PROTIME, INR, APTT in the last 72 hours.     Patient Active Problem List    Diagnosis Date Noted    Subarachnoid hemorrhage ([facility] [address] 09/20/2022    Abrasion of right hand 02/24/2022    Rotator cuff tendinitis, left 02/24/2022    Chronic left shoulder pain 02/21/2020    Trigger middle finger of right hand 02/21/2020    Type 2 diabetes mellitus without complication, without long-term current use of insulin ([facility] [address] 02/21/2020       Assessment:  Patient is a 58 y.o. male w/ PMH of DM and HTN who presented to the ED with altered mental status from a fall. He was found to have a small SAH and SDH on CT scan. Repeat CT scan at 6 hr showed no progression. Plan:  No emergent neurosurgical intervention indicated  - Floor: Q4H  For change in exam MUST contact neurosurgery team along with critical care or primary team  SDH:  - No further imaging unless there is a decline in neurologic status  - Maintain SBP <160; If PRN med insufficient, then may start Nicardipine infusion  - Keep Plt >100k & INR <1.4  - Hold all full dose anticoagulation & antiplatelet for 2 weeks  - SCDs for DVT prophylaxis  Speech consulted for swallow eval  PT/OT consulted, appreciate recs  Appreciate critical care team assistance in management  Thank you for consult. Will follow peripherally while in house. Please call with questions.      Tiffany Qureshi MD, PhD  [address], [address] [place] [address], [id number] [phone] (c), [phone] (o)

## 2022-09-21 NOTE — PROGRESS NOTES
Internal Medicine Progress Note    Admit Date: 9/20/2022  Day: 1   Diet: ADULT DIET; Regular; 3 carb choices (45 gm/meal)    CC: SAH s/p fall    Interval history: Spoke with the patient this morning at bedside with his daughter as a . Patient was able to provide much of the same history as we are given in signout from the ICU team.  There were no acute events overnight. Vitals were appreciated and were largely stable. Laboratory results were appreciated and were largely stable. Telemetry was appreciated patient was in normal sinus rhythm for the majority of the night. No new complaints overnight. Patient denies chest pain, shortness of breath, headache, nausea vomiting diarrhea. HPI:  Mr. Nasir Cruz is a 79-year-old man with past medical history of diabetes and hypertension who was brought to the hospital via EMS due to altered mental status. Patient presented with his family who were able to act as additional historians as the patient speaks in a police. Family states that the patient came home a few hours before presenting to the hospital with a large lump on the back of his head. Patient told them that he did not recall how this happened and seemed disoriented. The patient did report a diffuse headache which was moderate in severity with no alleviating or exacerbating factors. Patient stated as well that he had some mild blurry vision in his left eye but that has been going on for a couple of weeks. Patient also has left upper extremity weakness which he has been dealing with chronically. Patient also has some left-sided neck pain which could be secondary to the fall. Patient stated that he did remember falling and then blacking out. Patient denied any prodromal symptoms like lightheadedness, palpitations, or seizure-like activity before or during the incident. Patient also denied fevers, chills, chest pain, shortness of breath, extremity weakness or numbness additional to above history. Patient denies nausea or vomiting. Patient takes a daily 81 mg aspirin. Patient does not take any other anticoagulation. Patient also takes lisinopril at home for his hypertension. In the emergency department, the patient was alert and was able to maintain his own airway. Vital signs were stable upon presentation. On physical exam, patient had no focal neurologic deficits but did seem grossly disoriented. EKG was within normal limits. CT scan obtained in the ED was significant for a subarachnoid hemorrhage. CT of his cervical spine as well was within normal limits. Neurosurgery was consulted. Repeat CT scan showed that the subarachnoid hemorrhage was stable. As such, neurosurgery determined that there was no indication for surgical intervention at that time. They signed off from the patient. Patient was admitted to the ICU for management of all of the above issues. Patient's condition stabilized over the course of 2 days and he was transferred to the general medical floors.     Medications:     Scheduled Meds:   atorvastatin  80 mg Oral Nightly    [Held by provider] lisinopril-hydroCHLOROthiazide  1 tablet Oral Daily    tamsulosin  0.4 mg Oral Daily    insulin lispro  0-4 Units SubCUTAneous TID WC    insulin lispro  0-4 Units SubCUTAneous Nightly     Continuous Infusions:   dextrose       PRN Meds:sodium chloride, glucose, dextrose bolus **OR** dextrose bolus, glucagon (rDNA), dextrose, acetaminophen, ondansetron **OR** ondansetron, labetalol    Objective:   Vitals:   T-max:  Patient Vitals for the past 8 hrs:   BP Temp Temp src Pulse Resp SpO2   09/21/22 0800 109/82 98.4 °F (36.9 °C) Oral 71 17 93 %   09/21/22 0600 -- -- -- 64 -- --   09/21/22 0500 -- -- -- 71 -- --   09/21/22 0400 114/80 98 °F (36.7 °C) Oral 70 16 95 %   09/21/22 0300 -- -- -- 68 -- --   09/21/22 0200 -- -- -- 85 -- --   09/21/22 0100 -- -- -- 72 -- --       Intake/Output Summary (Last 24 hours) at 9/21/2022 0831  Last data filed at 9/20/2022 1400  Gross per 24 hour   Intake --   Output 450 ml   Net -450 ml       Physical Exam  Constitutional:       General: He is awake. Appearance: Normal appearance. Eyes:      Extraocular Movements: Extraocular movements intact. Cardiovascular:      Rate and Rhythm: Normal rate and regular rhythm. Pulses:           Radial pulses are 2+ on the right side and 2+ on the left side. Heart sounds: Normal heart sounds, S1 normal and S2 normal. No murmur heard. Pulmonary:      Effort: Pulmonary effort is normal.      Breath sounds: Normal breath sounds and air entry. Abdominal:      General: Abdomen is flat. Palpations: Abdomen is soft. Tenderness: There is no abdominal tenderness. Musculoskeletal:      Right lower leg: No edema. Left lower leg: No edema. Neurological:      Mental Status: He is alert and oriented to person, place, and time. Psychiatric:         Behavior: Behavior is cooperative. LABS:    CBC:   Recent Labs     09/19/22  1600 09/21/22  0521   WBC 8.4 7.0   HGB 12.7* 12.5*   HCT 37.3* 37.2*    221   MCV 87.7 87.2     Renal:    Recent Labs     09/19/22  1600 09/21/22  0521    138   K 4.1 4.4    104   CO2 21 21   BUN 20 18   CREATININE 1.3 1.1   GLUCOSE 79 109*   CALCIUM 9.5 8.7   MG  --  2.20   PHOS  --  3.4   ANIONGAP 11 13     Hepatic:   Recent Labs     09/19/22  1600 09/21/22  0521   AST 28  --    ALT 25  --    BILITOT 0.5  --    PROT 7.7  --    LABALBU 4.5 4.3   ALKPHOS 117  --      Troponin:   Recent Labs     09/19/22  1600   TROPONINI <0.01     BNP: No results for input(s): BNP in the last 72 hours. Lipids: No results for input(s): CHOL, HDL in the last 72 hours. Invalid input(s): LDLCALCU, TRIGLYCERIDE  ABGs:  No results for input(s): PHART, LHG9ETE, PO2ART, WXX9JXB, BEART, THGBART, M1DESRIQ, MCD3HXZ in the last 72 hours. INR: No results for input(s): INR in the last 72 hours.   Lactate: No results for input(s): LACTATE in the last 72 hours. Cultures:  -----------------------------------------------------------------  RAD:   CT HEAD WO CONTRAST   Final Result      Area of subarachnoid hemorrhage seen involving cortical sulci along the medial right frontal lobe appears stable with small associated subdural bleed also suspected which likewise appears essentially stable from prior exam.                Without contrast            Assessment/Plan:   Mr Rowdy Ott is a 51-year-old male with a past medical history of diabetes and hypertension who presented to the hospital after sustaining a ground-level fall. Imaging in the emergency department including a CT of the head without contrast confirmed that the patient had a subarachnoid hemorrhage. Patient had been managed in the ICU but his condition is stabilized such that he can be transferred to the general medical floors. It is unclear why the patient sustained a ground-level fall as he had no recollection of the event. Unwitnessed fall likely secondary to vasovagal syncope versus hypotension:  Will have pharmacy reconcile medications  Will hold antihypertensive meds at this time  We will work the patient up for potential cardiac etiology which could have caused the fall including an echocardiogram today- showed no overt abnormalities. Will discharge the patient on a home monitor to see if there are any further cardiac events after discharge later today 9/21/22. Traumatic brain injury secondary to subarachnoid hemorrhage  Continue atorvastatin  Hold aspirin at this time  Every 4 hour neurochecks  Will maintain systolic blood pressure below 160  PT OT will see the patient  SLP evaluation  Hemoglobin A1c ordered-awaiting results. Avoid antiplatelet or anticoagulation medications for the next 2 weeks at least  Maintain platelets above [phone]  Neurosurgery and neurology consulted. Neurosurgery has signed off.   Headaches-Tylenol 650 mg p.o. every 6 hours as needed  Dizziness-meclizine 25 mg p.o. 3 times daily as needed  Nausea-ondansetron 4 mg IV every 6 hours as needed. If nausea not completely resolved, will consider scopolamine patch  Orthostatic vital signs ordered  Chronic conditions  Hypertension-on lisinopril/hydrochlorothiazide. Currently on hold due to concern of hypotension  Type 2 diabetes-on metformin at home which is on hold while inpatient. On sliding scale here while inpatient  Benign prostatic hyperplasia-taking tamsulosin at home dose      Code Status:Full Code  FEN: ADULT DIET;  Regular; 3 carb choices (45 gm/meal)  PPX:  SCDs  DISPO: [facility]    Gray Cleaning MD  PGY1, Internal Medicine  09/21/22  8:36 AM    This patient will be staffed and discussed with Delia De La Cruz MD.

## 2022-09-21 NOTE — PROGRESS NOTES
Occupational Therapy  Facility/Department: [facility]  Occupational Therapy Initial Assessment /Treatment/Discharge     Name: Felipe Kaur  : 1960  MRN: [id number]  Date of Service: 2022    Discharge Recommendations: Jose Alejandro Emerson scored a 24/24 on the -PAC ADL Inpatient form. At this time, no further OT is recommended upon discharge. Recommend patient returns to prior setting with prior services. OT Equipment Recommendations  Equipment Needed: No       Patient Diagnosis(es): There were no encounter diagnoses. Past Medical History:  has a past medical history of Diabetes mellitus ([facility] [address] and Hypertension. Past Surgical History:  has a past surgical history that includes Finger trigger release (Right, 3/3/2022). Assessment   Assessment: Pt demonstrated good functional independence. Pt is from home alone and independent, but apears to have supportive family. No acute OT neds identified. Pt expreses no concerns regarding discharge to home. Prognosis: Good  Decision Making: Low Complexity  No Skilled OT: Safe to return home; No OT goals identified  REQUIRES OT FOLLOW-UP: No  Activity Tolerance  Activity Tolerance: Patient Tolerated treatment well        Plan   Plan  Plan Comment: Discharge pt from acute OT     Restrictions  Position Activity Restriction  Other position/activity restrictions: up with assistance    Subjective   General  Chart Reviewed: Yes  Patient assessed for rehabilitation services?: Yes  Additional Pertinent Hx: Pt presented to [facility] 22 with confusion after a fall on his driveway. Pt transferred to [facility] 22 for finding of subarachnoid hemorrage. CT Head=  Hyperdensity within the sulci of the medial anterior right parietal lobe  near the frontal parietal junction is consistent with subarachnoid  blood/hemorrhage.   There also is minimal subdural blood/hematoma along the  anterior falx in the same area, No definite underlying parenchymal brain abnormality is noted otherwise, Posterior scalp hematoma. No evidence of skull fracture. CT c-spine- Mild multilevel degenerative joint disease within the facets. No evidence of  fracture or malalignment. Family / Caregiver Present: No  Referring Practitioner: Dr. Bozena Nunez  Diagnosis: Subarachnoid hemorrhage  Subjective  Subjective: Pt in bed upon entry. Pt agreable to activity. General Comment  Comments: Video  service utilized     Social/Functional History  Social/Functional History  Lives With: Alone  Type of Home: House  Home Layout: Multi-level, Bed/Bath upstairs  Home Access: Stairs to enter without rails  Entrance Stairs - Number of Steps: 2  Bathroom Shower/Tub: Tub/Shower unit  Bathroom Toilet: Handicap height  Bathroom Equipment: Shower chair, Grab bars in shower  Has the patient had two or more falls in the past year or any fall with injury in the past year?: No  ADL Assistance: Independent  Homemaking Assistance: Independent  Ambulation Assistance: Independent  Transfer Assistance: Independent  Active : Yes  Occupation: Unemployed  Type of Occupation: Has not worked in last 7-8 months. Prior was working with machines to assemble boxes. Leisure & Hobbies: Watch TV, play with Bringg       Objective   Safety Devices  Type of Devices: Nurse notified;Call light within reach;Gait belt;Left in chair;Chair alarm in place  Balance  Sitting: Intact  Standing: Intact  Toilet Transfers  Toilet - Technique: Ambulating  Equipment Used: Standard toilet  Toilet Transfer: Independent  AROM: Within functional limits  Strength: Within functional limits  Coordination: Within functional limits  ADL  Feeding: Independent  Grooming: Independent  LE Dressing: Independent  Toileting: Independent     Activity Tolerance  Activity Tolerance: Patient tolerated evaluation without incident  Bed mobility  Supine to Sit: Independent  Bed Mobility Comments: HOB elevated, no rails utilized.   Transfers  Stand Step Transfers: Independent  Sit to stand: Independent  Stand to sit:  Independent  Vision  Vision: Within Functional Limits  Hearing  Hearing: Within functional limits  Cognition  Overall Cognitive Status: WNL  Orientation  Orientation Level: Oriented to person;Oriented to place;Oriented to situation;Oriented to time                  Education Given To: Patient  Education Provided: Role of Therapy  Barriers to Learning:  (language (interpeter service utilized))  Education Outcome: Verbalized understanding           Hand Dominance  Hand Dominance: Right        AM-PAC Score        AM-PAC Inpatient Daily Activity Raw Score: 24 (09/21/22 0946)  AM-PAC Inpatient ADL T-Scale Score : 57.54 (09/21/22 0946)  ADL Inpatient CMS 0-100% Score: 0 (09/21/22 0946)  ADL Inpatient CMS G-Code Modifier : [address] (09/21/22 3647)    Goals   No goals indicated       Therapy Time   Individual Concurrent Group Co-treatment   Time In 0819         Time Out 0858         Minutes 39         Timed Code Treatment Minutes: [address], OTR/L [address]

## 2022-09-21 NOTE — PROGRESS NOTES
Clinical Pharmacy Progress Note    All IVs in NS - Management by Pharmacy    Consult Date(s): 9/20  Consulting Provider(s): Dr Ilir Mccloud / Scarlet THOMAS s/p fall at home - All IVs in Normal Saline  Drips will be adjusted to normal saline as appropriate based on compatibility, in an effort to avoid fluid shifts, as D5W is osmotically active. The following intermittent IV drips / infusions have been adjusted to saline:  None at this time  The following medications must remain in D5W due to incompatibility with normal saline:  None at this time  Note: Patient has dextrose ordered as part of hypoglycemia treatment protocol. Total IV fluid delivered to patient over last 24 hrs: < 50mL  Pharmacist will follow daily to ensure all IVPBs / drips are in NS where possible. Thank you for consulting Pharmacy!     Adam Li PharmD., BCPS   9/21/2022 8:52 AM  Wireless: 1-1725

## 2022-09-21 NOTE — PLAN OF CARE
Problem: Discharge Planning  Goal: Discharge to home or other facility with appropriate resources  9/21/2022 1506 by Saran Bach RN  Outcome: Completed  9/21/2022 0624 by Armin Menard RN  Outcome: Progressing  9/21/2022 0623 by Armin Menard RN  Outcome: Progressing  Flowsheets (Taken 9/20/2022 2138)  Discharge to home or other facility with appropriate resources: Identify barriers to discharge with patient and caregiver     Problem: Pain  Goal: Verbalizes/displays adequate comfort level or baseline comfort level  9/21/2022 1506 by Saran Bach RN  Outcome: Completed  9/21/2022 0624 by Armin Menard RN  Outcome: Progressing  9/21/2022 0623 by Armin Menard RN  Outcome: Progressing     Problem: Chronic Conditions and Co-morbidities  Goal: Patient's chronic conditions and co-morbidity symptoms are monitored and maintained or improved  9/21/2022 1506 by Saran Bach RN  Outcome: Completed  9/21/2022 0624 by Armin Menard RN  Outcome: Progressing  9/21/2022 0623 by Armin Menard RN  Outcome: Progressing     Problem: Safety - Adult  Goal: Free from fall injury  9/21/2022 1506 by Saran Bach RN  Outcome: Completed  9/21/2022 0624 by Armin Menard RN  Outcome: Progressing  9/21/2022 0623 by Armin Menard RN  Outcome: Progressing     Problem: ABCDS Injury Assessment  Goal: Absence of physical injury  9/21/2022 1506 by Saran Bach RN  Outcome: Completed  9/21/2022 0624 by Armin Menard RN  Outcome: Progressing  9/21/2022 0623 by Armin Menard RN  Outcome: Progressing     Problem: Safety - Medical Restraint  Goal: Remains free of injury from restraints (Restraint for Interference with Medical Device)  Description: INTERVENTIONS:  1. Determine that other, less restrictive measures have been tried or would not be effective before applying the restraint  2. Evaluate the patient's condition at the time of restraint application  3. Inform patient/family regarding the reason for restraint  4.  Q2H: Monitor safety, psychosocial status, comfort, nutrition and hydration  9/21/2022 0624 by Mendy Edwards, RN  Outcome: Completed  9/21/2022 0624 by Mendy Edwards, RN  Outcome: Progressing

## 2022-09-21 NOTE — CARE COORDINATION
Case management is following for discharge planning. The chart was reviewed. Mr. Kalina Landis is from home alone in his own apartment. He did well with therapy today. He will return home at discharge. No service needs anticipated.            PT AM-PAC: 24 / 24 per last evaluation on: 9/21    OT AM-PAC: 24 / 24 per last evaluation on: 9/21    Electronically signed by MARCELLA Irizarry RN-[facility]  Case Management  [phone]

## 2022-09-21 NOTE — DISCHARGE INSTR - COC
Continuity of Care Form    Patient Name: Christopher Hicks   :  1960  MRN:  [id number]    Admit date:  2022  Discharge date:  22    Code Status Order: Full Code   Advance Directives:     Admitting Physician:  Salima Oneill MD  PCP: KANDACE Kelly CNP    Discharging Nurse: ELLA [facility] Unit/Room#: 4232/2798-36  Discharging Unit Phone Number: [phone]    Emergency Contact:   Extended Emergency Contact Information  Primary Emergency Contact: Velma Landry  Home Phone: [phone]  Relation: Spouse  Secondary Emergency Contact: Laurent Glasgow Phone: [phone]  Relation: Child    Past Surgical History:  Past Surgical History:   Procedure Laterality Date    FINGER TRIGGER RELEASE Right 3/3/2022    RIGHT LONG FINGER TRIGGER FINGER RELEASE performed by Suman Melchor MD at [address] OR       Immunization History: There is no immunization history on file for this patient.     Active Problems:  Patient Active Problem List   Diagnosis Code    Chronic left shoulder pain M25.512, G89.29    Trigger middle finger of right hand M65.331    Type 2 diabetes mellitus without complication, without long-term current use of insulin ([facility]) E11.9    Abrasion of right hand S60.511A    Rotator cuff tendinitis, left M75.82    Subarachnoid hemorrhage ([facility]) I60.9       Isolation/Infection:   Isolation            No Isolation          Patient Infection Status       Infection Onset Added Last Indicated Last Indicated By Review Planned Expiration Resolved Resolved By    None active    Resolved    COVID-19 (Rule Out) 22 COVID-19 (Ordered)   22 Rule-Out Test Resulted            Nurse Assessment:  Last Vital Signs: BP (!) 120/93   Pulse 65   Temp 98 °F (36.7 °C) (Oral)   Resp 16   Wt 151 lb 3.8 oz (68.6 kg)   SpO2 95%   BMI 26.79 kg/m²     Last documented pain score (0-10 scale): Pain Level: 2  Last Weight:   Wt Readings from Last 1 Encounters:   22 151 lb 3.8 oz (68.6 kg)     Mental Status:  oriented, alert, coherent, logical, thought processes intact, and able to concentrate and follow conversation    IV Access:  - None    Nursing Mobility/ADLs:  Walking   Independent  Transfer  Independent  [address] [facility]  Med Delivery   whole    Wound Care Documentation and Therapy:  Incision 03/03/22 Hand Right (Active)   Number of days: 202        Elimination:  Continence: Bowel: Yes  Bladder: Yes  Urinary Catheter: None   Colostomy/Ileostomy/Ileal Conduit: No       Date of Last BM: 9/20/22    Intake/Output Summary (Last 24 hours) at 9/21/2022 1532  Last data filed at 9/21/2022 1418  Gross per 24 hour   Intake 480 ml   Output 0 ml   Net 480 ml     I/O last 3 completed shifts:  In: -   Out: 450 [Urine:450]    Safety Concerns:     History of Falls (last 30 days)    Impairments/Disabilities:      None    Nutrition Therapy:  Current Nutrition Therapy:   - Oral Diet:  General    Routes of Feeding: Oral  Liquids: Thin Liquids  Daily Fluid Restriction: no  Last Modified Barium Swallow with Video (Video Swallowing Test): not done    Treatments at the Time of Hospital Discharge:   Respiratory Treatments: n/a  Oxygen Therapy:  is not on home oxygen therapy.   Ventilator:    - No ventilator support    Rehab Therapies: n/a  Weight Bearing Status/Restrictions: No weight bearing restrictions  Other Medical Equipment (for information only, NOT a DME order):  n/a  Other Treatments: n/a    Patient's personal belongings (please select all that are sent with patient):  None    RN SIGNATURE:  lala    CASE MANAGEMENT/SOCIAL WORK SECTION    Inpatient Status Date: ***    Readmission Risk Assessment Score:  Readmission Risk              Risk of Unplanned Readmission:  11           Discharging to Facility/ Agency   Name:   Address:  Phone:  Fax:    Dialysis Facility (if applicable)   Name:  Address:  Dialysis Schedule:  Phone:  Fax:    / signature: {Esignature:[id number]}    PHYSICIAN SECTION    Prognosis: {Prognosis:7610526621}    Condition at Discharge: Diego Chapman Patient Condition:744259992}    Rehab Potential (if transferring to Rehab): {Prognosis:2014397811}    Recommended Labs or Other Treatments After Discharge: ***    Physician Certification: I certify the above information and transfer of Kim Tadeor  is necessary for the continuing treatment of the diagnosis listed and that he requires {Admit to Appropriate Level of Care:31932} for {GREATER/LESS:273872168} 30 days.      Update Admission H&P: {CHP DME Changes in JDT:332456451}    PHYSICIAN SIGNATURE:  {Esignature:[id number]}

## 2022-09-21 NOTE — PROGRESS NOTES
Physical Therapy  Facility/Department: [facility]  Physical Therapy Initial Assessment/Discharge    Name: Nely Sun  : 1960  MRN: [id number]  Date of Service: 2022    Discharge Recommendations: Jose Alejandro Mitchell scored a 24/24 on the AM-PAC short mobility form. At this time, no further PT is recommended upon discharge. Recommend patient returns to prior setting with prior services. PT Equipment Recommendations  Equipment Needed: No      Patient Diagnosis(es): There were no encounter diagnoses. Past Medical History:  has a past medical history of Diabetes mellitus ([address] and Hypertension. Past Surgical History:  has a past surgical history that includes Finger trigger release (Right, 3/3/2022). Assessment   Assessment: pt presents with [facility] after unwitnessed fall. At baseline he lives alone in a two-story home with a flight of stairs to his bed/bath. He is normally IND with mobility without device and ADLs/IADLs. He presents today with no mobility deficits and is IND with ambulation up to 300ft, navigating 12 stairs IND. He demo's good balance and safety awareness. Pt has no further PT needs at this time due to being at baseline level. Decision Making: Low Complexity  Requires PT Follow-Up: No  Activity Tolerance  Activity Tolerance: Patient tolerated evaluation without incident     Plan   Plan  Plan: Discharge  Safety Devices  Type of Devices: Nurse notified, Call light within reach, Gait belt, Left in chair, Chair alarm in place     Restrictions  Position Activity Restriction  Other position/activity restrictions: up with assistance     Subjective   General  Patient assessed for rehabilitation services?: Yes  Additional Pertinent Hx: pt presented to [facility] ED  after falling with bump on the back of his head. He does not recall how he fell, is disoriented. T/f to [facility] with CT head showing SAH.   Referring Practitioner: Lidia Frost MD  Referral Date : 22  Diagnosis: SAH  Follows Commands: Within Functional Limits  General Comment  Comments: Pt cleared to see by RN. Earl  used during session. Subjective  Subjective: pt presents semisupine in bed, pleasant and agreeable to therapy. Reports pain 6-7/10 in head, RN aware. Social/Functional History  Social/Functional History  Lives With: Alone  Type of Home: House  Home Layout: Multi-level, Bed/Bath upstairs  Home Access: Stairs to enter without rails  Entrance Stairs - Number of Steps: 2  Bathroom Shower/Tub: Tub/Shower unit  Bathroom Toilet: Handicap height  Bathroom Equipment: Shower chair, Grab bars in shower  Has the patient had two or more falls in the past year or any fall with injury in the past year?: No  ADL Assistance: Independent  Homemaking Assistance: Independent  Ambulation Assistance: Independent  Transfer Assistance: Independent  Active : Yes  Occupation: Unemployed  Type of Occupation: Has not worked in last 7-8 months. Prior was working with machines to assemble boxes. Leisure & Hobbies: Watch TV, play with grandkids  Vision/Hearing  Vision  Vision: Within Functional Limits  Hearing  Hearing: Within functional limits    Cognition   Orientation  Orientation Level: Oriented to person;Oriented to place;Oriented to situation;Oriented to time (oriented to being in hospital, not sure name of hospital.)  Cognition  Overall Cognitive Status: WNL     Objective   Heart Rate: 71  Heart Rate Source: Monitor  BP: 109/82  BP Location: Left upper arm  BP Method: Automatic  Patient Position: Semi fowlers  MAP (Calculated): 91  Resp: 17  SpO2: 93 %  O2 Device: None (Room air)        Gross Assessment  AROM: Within functional limits  PROM: Within functional limits  Strength:  Within functional limits  Coordination: Within functional limits  Tone: Normal  Sensation: Intact     Balance  Sitting: Intact  Standing: Intact  Bed mobility  Supine to Sit: Independent  Bed Mobility Comments: HOB elevated, no rails utilized. Transfers  Sit to Stand: Independent  Stand to sit: Independent  Comment: No device used to stand from bed, recliner, toilet. Ambulation  Surface: level tile  Device: No Device  Assistance: Independent  Gait Deviations: None  Distance: 300ft  Comments: No deviations noted, ambulates without device or safety concerns. Stairs/Curb  Stairs?: Yes  Stairs  # Steps : 12  Rails: None;Right ascending  Curbs: 6\"  Assistance: Independent  Comment: Reciprocal pattern, no rail to ascend IND. Reciprocal pattern, use of L HDR to descend. No safety concerns. AM-PAC Score  AM-PAC Inpatient Mobility Raw Score : 24 (09/21/22 0948)  AM-PAC Inpatient T-Scale Score : 61.14 (09/21/22 0948)  Mobility Inpatient CMS 0-100% Score: 0 (09/21/22 0948)  Mobility Inpatient CMS G-Code Modifier : [facility] (09/21/22 6175)      Education  Patient Education  Education Given To: Patient  Education Provided: Role of Therapy  Education Method: Verbal  Barriers to Learning: None  Education Outcome: Verbalized understanding      Therapy Time   Individual Concurrent Group Co-treatment   Time In 0819         Time Out 0857         Minutes 38          Timed Code Treatment Minutes: 23    Total Treatment Minutes:38     If patient is discharged prior to next treatment, this note will serve as the discharge summary.     Anastasia Yun PT, DPT, NCS, CSRS

## 2022-09-21 NOTE — PROGRESS NOTES
Pt sitting up in chair eating breakfast/ does c/o slight headache posterior/ pt ambulated in hallway with PT/OT/ pt independent/pt is stepdown care/ neuro unchanged/ cont to check q4h/ see flow sheet    1540 DC instructions explained to daughter+ pt/  all questions concerns answered/ heart monitor is on pt/  pt will follow up with PCP regarding BP management/ agree to check blood glucose before meals to have record for follow up with PCP    1600 transport to vehicle via wheelchair with all belongings including Rx medications from out pt pharm

## 2022-09-21 NOTE — PROGRESS NOTES
Blood sugar was 158 but did not transfer over. Insulin not given due to not being in the order range.

## 2022-09-21 NOTE — PLAN OF CARE
Problem: Discharge Planning  Goal: Discharge to home or other facility with appropriate resources  9/21/2022 0624 by Jordyn Sheehan RN  Outcome: Progressing     Problem: Pain  Goal: Verbalizes/displays adequate comfort level or baseline comfort level  9/21/2022 0624 by Jordyn Sheehan RN  Outcome: Progressing     Problem: Chronic Conditions and Co-morbidities  Goal: Patient's chronic conditions and co-morbidity symptoms are monitored and maintained or improved  9/21/2022 0624 by Jordyn Sheehan RN  Outcome: Progressing     Problem: Safety - Adult  Goal: Free from fall injury  9/21/2022 0624 by Jordyn Sheehan RN  Outcome: Progressing     Problem: ABCDS Injury Assessment  Goal: Absence of physical injury  9/21/2022 0624 by Jordyn Sheehan RN  Outcome: Progressing

## 2022-09-22 LAB
ESTIMATED AVERAGE GLUCOSE: 154.2 MG/DL
HBA1C MFR BLD: 7 %

## 2022-09-23 LAB
BLOOD CULTURE, ROUTINE: NORMAL
CULTURE, BLOOD 2: NORMAL

## 2022-09-28 PROCEDURE — 93244 EXT ECG>48HR<7D REV&INTERPJ: CPT | Performed by: INTERNAL MEDICINE

## 2022-10-18 DIAGNOSIS — I49.8 OTHER CARDIAC ARRHYTHMIA: Primary | ICD-10-CM

## 2022-10-19 ENCOUNTER — TELEPHONE (OUTPATIENT)
Dept: CARDIOLOGY CLINIC | Age: 62
End: 2022-10-19

## 2022-10-19 NOTE — TELEPHONE ENCOUNTER
The final report for the cam monitor has been read and scanned under the media tab. Thank you for your referral. Please feel free to contact our office with any questions at  [phone].

## 2022-10-24 NOTE — TELEPHONE ENCOUNTER
Called pt. Pt's daughter picked up the call and I relayed the message per DARSHANA. She verbalized understanding. She stated that pt was supposed to wear monitor for 1 week. But it came off. Then, she call nurse Barbara Scott in hospital to ask her what to do next. But she didn't understand her. So, after a week, pt's daughter send the monitor back. So now, she is asking that is there any need to wear the monitor again or it is ok?

## 2022-10-27 PROBLEM — I10 PRIMARY HYPERTENSION: Status: ACTIVE | Noted: 2022-10-27

## 2022-12-06 NOTE — PROGRESS NOTES
[address]  12/6/22  Referring: Dr. Davidson ref. provider found    Mame Malloy. REASON FOR CONSULT/CHIEF COMPLAINT/HPI     Reason for visit/ Chief complaint   No chief complaint on file. HPI Man Christian Anne is a 58 y.o. male from [facility] who is here for hospital follow up. On 9/19/22, this patient presented to the ER with an altered mental state. His family stated he came in from outside and was disoriented. He had a large lump on the back of his head and could not explain what happened. He also had a headache. He had no n/v. Head CT revealed a subarachnoid hemorrhage (right medial anterior parietal lobe); he takes asa daily. He had been having dizzy spells over the last year with neat syncopal episodes. He was alert, disoriented with no neurological deficits, and in no distress in the ER. Once an aneurysm was ruled out via head/neck CTA, he was transferred to [facility]. Incidentally, he was found to have an acute UTI. He was admitted to [facility] and seen by neurology. He was discharged 9/22/22. He wore a monitor post discharge which showed normal rhythm. He is treated for HTN and DM. Today, he    Patient is adherent with medications and is tolerating them well without side effects. HISTORY/ALLERGIES/ROS     MedHx:  has a past medical history of Diabetes mellitus ([address] and Hypertension. SurgHx:  has a past surgical history that includes Finger trigger release (Right, 3/3/2022). SocHx:  reports that he has been smoking. He has never used smokeless tobacco. He reports that he does not drink alcohol and does not use drugs. FamHx: ***No family history of premature coronary artery disease, sudden death, or aneurysm  Allergies: Patient has no known allergies. ROS:   Review of Systems       MEDICATIONS      Prior to Admission medications    Medication Sig Start Date End Date Taking?  Authorizing Provider   acetaminophen (TYLENOL) 500 MG tablet Take 500 mg by mouth every 6 hours as needed for Pain    Historical Provider, MD   Carboxymethylcellul-Glycerin (CLEAR EYES FOR DRY EYES) 1-0.25 % SOLN Apply 1 drop to eye as needed    Historical Provider, MD   atorvastatin (LIPITOR) 80 MG tablet Take 80 mg by mouth at bedtime 9/18/22   Historical Provider, MD   aspirin 81 MG chewable tablet Take 81 mg by mouth daily 8/26/22 9/21/22  Historical Provider, MD   lisinopril-hydroCHLOROthiazide (PRINZIDE;ZESTORETIC) 10-12.5 MG per tablet Take 2 tablets by mouth daily 9/17/22 9/21/22  Historical Provider, MD   tamsulosin (FLOMAX) 0.4 MG capsule Take 1 capsule by mouth daily 3/9/25   Mikki Gilbert MD   ibuprofen (ADVIL;MOTRIN) 600 MG tablet Take 1 tablet by mouth every 8 hours as needed for Pain 10/25/20 3/24/21  Cindy Hernández MD   metFORMIN (GLUCOPHAGE) 500 MG tablet Take 500 mg by mouth 2 times daily (with meals)    Historical Provider, MD   naproxen (NAPROSYN) 500 MG tablet Take 1 tablet by mouth 2 times daily (with meals) 12/17/17 3/24/21  JORGE Santiago       PHYSICAL EXAM        There were no vitals filed for this visit.        Gen Alert, cooperative, no distress Heart  Regular rate and rhythm, *** murmur   Head Normocephalic, atraumatic, no abnormalities Abd  Soft, NT, +BS, no mass, no OM   Eyes PERRLA, conj/corn clear Ext  Ext nl, AT, no C/C, *** edema   Nose Nares normal, no drain age, Non-tender Pulse 2+ and symmetric   Throat Lips, mucosa, tongue normal Skin Color/text/turg nl, no rash/lesions   Neck S/S, TM, NT, no bruit Psych Nl mood and affect   Lung CTA-B, unlabored, no DTP     Ch wall NT, no deform       LABS and Imaging     Relevant and available CV data reviewed    EKG personally interpreted: ***    [unfilled]    [unfilled]    [unfilled]    Echo/MRI: ***     Stress:***    Cath: ***    Holter:***      {complexity:04836}Risk  {complexity:48642}Complexity/Medical Decision Making    Outside/Care everywhere records Reviewed  Labs Reviewed  Prior Imaging, ekg, cath, echo reviewed when available  Medications reviewed  Old Notes reviewed  ASSESSMENT AND PLAN     SAH / light-headedness  S/p fall from 9/2022  Monitor showed normal rhythm  Used to take baby asa  Plan:    2. Hyperlipidemia  Taking Lipitor 80mg  Managed per PCP  Stable  Plan:    3. Hypertension  Stable  Plan:    Patient counseled on lifestyle modification, diet, and exercise. Follow Up: No follow-ups on file.      Vicenta Conrad MD  Cardiologist [facility]

## 2022-12-12 ENCOUNTER — OFFICE VISIT (OUTPATIENT)
Dept: CARDIOLOGY CLINIC | Age: 62
End: 2022-12-12
Payer: MEDICARE

## 2022-12-12 VITALS
HEIGHT: 63 IN | WEIGHT: 161.3 LBS | BODY MASS INDEX: 28.58 KG/M2 | DIASTOLIC BLOOD PRESSURE: 90 MMHG | HEART RATE: 71 BPM | OXYGEN SATURATION: 97 % | SYSTOLIC BLOOD PRESSURE: 162 MMHG

## 2022-12-12 DIAGNOSIS — I10 HYPERTENSION, UNSPECIFIED TYPE: ICD-10-CM

## 2022-12-12 DIAGNOSIS — I49.8 OTHER CARDIAC ARRHYTHMIA: Primary | ICD-10-CM

## 2022-12-12 DIAGNOSIS — E78.49 OTHER HYPERLIPIDEMIA: ICD-10-CM

## 2022-12-12 DIAGNOSIS — R55 SYNCOPE, UNSPECIFIED SYNCOPE TYPE: ICD-10-CM

## 2022-12-12 PROBLEM — I49.9 ARRHYTHMIA: Status: ACTIVE | Noted: 2022-12-12

## 2022-12-12 PROCEDURE — G8484 FLU IMMUNIZE NO ADMIN: HCPCS | Performed by: INTERNAL MEDICINE

## 2022-12-12 PROCEDURE — G8419 CALC BMI OUT NRM PARAM NOF/U: HCPCS | Performed by: INTERNAL MEDICINE

## 2022-12-12 PROCEDURE — G8427 DOCREV CUR MEDS BY ELIG CLIN: HCPCS | Performed by: INTERNAL MEDICINE

## 2022-12-12 PROCEDURE — 3078F DIAST BP <80 MM HG: CPT | Performed by: INTERNAL MEDICINE

## 2022-12-12 PROCEDURE — 3017F COLORECTAL CA SCREEN DOC REV: CPT | Performed by: INTERNAL MEDICINE

## 2022-12-12 PROCEDURE — 4004F PT TOBACCO SCREEN RCVD TLK: CPT | Performed by: INTERNAL MEDICINE

## 2022-12-12 PROCEDURE — 3074F SYST BP LT 130 MM HG: CPT | Performed by: INTERNAL MEDICINE

## 2022-12-12 PROCEDURE — 99203 OFFICE O/P NEW LOW 30 MIN: CPT | Performed by: INTERNAL MEDICINE

## 2022-12-12 NOTE — PROGRESS NOTES
[address]  12/12/22  Referring: Dr. Davidson ref. provider found    Mariah Puga. REASON FOR CONSULT/CHIEF COMPLAINT/HPI     Reason for visit/ Chief complaint     Chief Complaint   Patient presents with    New Patient    Other     Lt leg to ankle burning sensation and more pain  Rt leg less pain compared to lt one    Hypertension       HPI Man Rudolph Yu is a 58 y.o. male from [place] who is here for hospital follow up. On 9/19/22, this patient presented to the ER with an altered mental state. His family stated he came in from outside and was disoriented. He had a large lump on the back of his head and could not explain what happened. He also had a headache. He had no n/v. Head CT revealed a subarachnoid hemorrhage (right medial anterior parietal lobe); he takes asa daily. He had been having dizzy spells over the last year with neat syncopal episodes. He was alert, disoriented with no neurological deficits, and in no distress in the ER. Once an aneurysm was ruled out via head/neck CTA, he was transferred to [facility]. Incidentally, he was found to have an acute UTI. He was admitted to [facility] and seen by neurology. He was discharged 9/22/22. He wore a monitor post discharge which showed normal rhythm. He is treated for HTN and DM. Today, he is seen with an interpretor. Other than the recent episode where he passed out he states he has not had any other episodes. He denies any dizziness. He has left leg pain and is asking for rx for this. Patient is adherent with medications and is tolerating them well without side effects. HISTORY/ALLERGIES/ROS     MedHx:  has a past medical history of Diabetes mellitus ([facility] [address] and Hypertension. SurgHx:  has a past surgical history that includes Finger trigger release (Right, 3/3/2022). SocHx:  reports that he has been smoking. He has never used smokeless tobacco. He reports that he does not drink alcohol and does not use drugs.    FamHx: No family history of premature coronary artery disease, sudden death, or aneurysm  Allergies: Patient has no known allergies. MEDICATIONS      Prior to Admission medications    Medication Sig Start Date End Date Taking?  Authorizing Provider   acetaminophen (TYLENOL) 500 MG tablet Take 500 mg by mouth daily   Yes Historical Provider, MD   Carboxymethylcellul-Glycerin (CLEAR EYES FOR DRY EYES) 1-0.25 % SOLN Apply 1 drop to eye daily   Yes Historical Provider, MD   atorvastatin (LIPITOR) 80 MG tablet Take 80 mg by mouth at bedtime 9/18/22  Yes Historical Provider, MD   tamsulosin (FLOMAX) 0.4 MG capsule Take 1 capsule by mouth daily 7/1/99  Yes Robert Andrade MD   metFORMIN (GLUCOPHAGE) 500 MG tablet Take 500 mg by mouth 2 times daily (with meals)   Yes Historical Provider, MD   aspirin 81 MG chewable tablet Take 81 mg by mouth daily 8/26/22 9/21/22  Historical Provider, MD   lisinopril-hydroCHLOROthiazide (PRINZIDE;ZESTORETIC) 10-12.5 MG per tablet Take 2 tablets by mouth daily 9/17/22 9/21/22  Historical Provider, MD   ibuprofen (ADVIL;MOTRIN) 600 MG tablet Take 1 tablet by mouth every 8 hours as needed for Pain 10/25/20 3/24/21  Mandeep Park MD   naproxen (NAPROSYN) 500 MG tablet Take 1 tablet by mouth 2 times daily (with meals) 12/17/17 3/24/21  JORGE Marquez       PHYSICAL EXAM        Vitals:    12/12/22 1003   BP: (!) 162/90   Pulse: 71   SpO2: 97%    Weight: 161 lb 4.8 oz (73.2 kg)     Gen Alert, cooperative, no distress Heart  Regular rate and rhythm, no murmur   Head Normocephalic, atraumatic, no abnormalities Abd  Soft, NT, +BS, no mass, no OM   Eyes PERRLA, conj/corn clear Ext  Ext nl, AT, no C/C, no edema   Nose Nares normal, no drain age, Non-tender Pulse 2+ and symmetric   Throat Lips, mucosa, tongue normal Skin Color/text/turg nl, no rash/lesions   Neck S/S, TM, NT, no bruit Psych Nl mood and affect   Lung CTA-B, unlabored, no DTP     Ch wall NT, no deform       LABS and Imaging Relevant and available CV data reviewed    EKG personally interpreted:  NSR (in hospital in September)    Echo 9/21/22:  Left ventricular cavity size is normal with normal left ventricular wall thickness. There is asymmetric hypertrophy of the basal septum. Ejection fraction is estimated to be 50-55%. No regional wall motion abnormalities. Normal right ventricular size TAPSE = 1.46 cm / RV S'' Velocity = 10.6 cm/s    Holter:  6 day monitor - normal monitor, no arrhythmias      ModerateRisk  ModerateComplexity/Medical Decision Making    Outside/Care everywhere records Reviewed  Labs Reviewed  Prior Imaging, ekg, cath, echo reviewed when available  Medications reviewed  Old Notes reviewed  ASSESSMENT AND PLAN     Syncope  S/p fall from 9/2022  Unclear if LOC was due to syncope leading to fall, or if fall hitting head was cause of LOC  No evidence of cardiac syncope  Culprit may have been overmedication for BP  Monitor showed normal rhythm  Used to take baby asa  Plan: patient reassured that no further workup is needed     2. Hyperlipidemia  Taking Lipitor 80mg  Managed per PCP  Stable  Plan:  continue per PCP    3. Hypertension  Elevated today  Plan:  defer mgmt to PCP, will need to start less aggressive meds than before to avoid further falls    Patient counseled on lifestyle modification, diet, and exercise. Follow Up:  PRN, PCP to re-evaluate b/p    Carmen Mitchell MD  Cardiologist [facility]    Scribe's attestation: This note was scribed in the presence of Dr Esteban Lanier MD by Pura Gilbert RN. Physician Attestation  The scribe wrote this note in the presence of me Everardo Hidalgo MD). The scribe may have prepopulated components of this note with my historical  intellectual property under my direct supervision. Any additions to this intellectual property were performed in my presence and at my direction.   Furthermore, the content and accuracy of this note have been reviewed by me with edits by me as needed.   Mellisa Denson MD 12/12/2022 10:25 AM

## 2023-01-30 ENCOUNTER — HOSPITAL ENCOUNTER (OUTPATIENT)
Age: 63
Discharge: HOME | End: 2023-01-30
Payer: MEDICARE

## 2023-01-30 ENCOUNTER — HOSPITAL ENCOUNTER (OUTPATIENT)
Dept: GENERAL RADIOLOGY | Age: 63
Discharge: HOME | End: 2023-01-30
Payer: MEDICARE

## 2023-01-30 DIAGNOSIS — R52 PAIN: ICD-10-CM

## 2023-01-30 LAB
EKG ATRIAL RATE: 82 BPM
EKG DIAGNOSIS: NORMAL
EKG P AXIS: 34 DEGREES
EKG P-R INTERVAL: 184 MS
EKG Q-T INTERVAL: 370 MS
EKG QRS DURATION: 102 MS
EKG QTC CALCULATION (BAZETT): 432 MS
EKG R AXIS: -2 DEGREES
EKG T AXIS: 14 DEGREES
EKG VENTRICULAR RATE: 82 BPM

## 2023-01-30 PROCEDURE — 73030 X-RAY EXAM OF SHOULDER: CPT

## 2023-01-30 PROCEDURE — 93005 ELECTROCARDIOGRAM TRACING: CPT | Performed by: FAMILY MEDICINE

## 2023-02-09 ENCOUNTER — OFFICE VISIT (OUTPATIENT)
Dept: ORTHOPEDIC SURGERY | Age: 63
End: 2023-02-09

## 2023-02-09 VITALS — BODY MASS INDEX: 28.53 KG/M2 | HEIGHT: 63 IN | WEIGHT: 161 LBS

## 2023-02-09 DIAGNOSIS — M75.82 ROTATOR CUFF TENDINITIS, LEFT: Primary | ICD-10-CM

## 2023-02-09 DIAGNOSIS — M65.331 TRIGGER MIDDLE FINGER OF RIGHT HAND: ICD-10-CM

## 2023-02-09 DIAGNOSIS — M25.562 ACUTE PAIN OF LEFT KNEE: ICD-10-CM

## 2023-02-09 DIAGNOSIS — F17.200 CURRENT SMOKER: ICD-10-CM

## 2023-02-09 RX ORDER — PREDNISONE 10 MG/1
TABLET ORAL
Qty: 26 TABLET | Refills: 0 | Status: SHIPPED | OUTPATIENT
Start: 2023-02-09

## 2023-02-09 NOTE — LETTER
[facility]  [address]  [address]  Phone: [phone]  Fax: [phone]    Gray Anton MD        February 9, 2023     Patient: Elvia Gary   YOB: 1960   Date of Visit: 2/9/2023       To Whom it May Concern: Ayleen Rich was seen in my clinic on 2/9/2023. Please excuse for time missed at work. He was accompanied by family today at the appointment. If you have any questions or concerns, please don't hesitate to call.     Sincerely,         Gray Anton MD

## 2023-02-09 NOTE — PROGRESS NOTES
CHIEF COMPLAINT:   1- Right long (middle) finger pain/ trigger finger, surgery 3/3/2022. 2- Left shoulder pain/ cuff tendinopathy/ impingement syndrome. HISTORY:  Mr. Marine Winslow is 61 y.o. [place] male right handed presents today for f/u evaluation of left shoulder pain which started Feb 2022. He had left shoulder cortisone injection on 3/29/2022 with good improvement. He is complaining of sharp pain, 7/10. Pain is increase with elevation and decrease with rest. No radiation and no numbness and tingling sensation. No other complaint. He had right long (middle) finger trigger finger, surgery 3/3/2022, and doing well with that. Past Medical History:   Diagnosis Date    Diabetes mellitus ([facility] [address]     Hypertension        Past Surgical History:   Procedure Laterality Date    FINGER TRIGGER RELEASE Right 3/3/2022    RIGHT LONG FINGER TRIGGER FINGER RELEASE performed by Fannie Tinsley MD at [address] [place] [address] History     Socioeconomic History    Marital status:      Spouse name: Not on file    Number of children: Not on file    Years of education: Not on file    Highest education level: Not on file   Occupational History    Not on file   Tobacco Use    Smoking status: Every Day    Smokeless tobacco: Never   Vaping Use    Vaping Use: Never used   Substance and Sexual Activity    Alcohol use: No    Drug use: No    Sexual activity: Not on file   Other Topics Concern    Not on file   Social History Narrative    Not on file     Social Determinants of Health     Financial Resource Strain: Not on file   Food Insecurity: Not on file   Transportation Needs: Not on file   Physical Activity: Not on file   Stress: Not on file   Social Connections: Not on file   Intimate Partner Violence: Not on file   Housing Stability: Not on file       No family history on file.     Current Outpatient Medications on File Prior to Visit   Medication Sig Dispense Refill    acetaminophen (TYLENOL) 500 MG tablet Take 500 mg by mouth daily Carboxymethylcellul-Glycerin (CLEAR EYES FOR DRY EYES) 1-0.25 % SOLN Apply 1 drop to eye daily      atorvastatin (LIPITOR) 80 MG tablet Take 80 mg by mouth at bedtime      [DISCONTINUED] aspirin 81 MG chewable tablet Take 81 mg by mouth daily      [DISCONTINUED] lisinopril-hydroCHLOROthiazide (PRINZIDE;ZESTORETIC) 10-12.5 MG per tablet Take 2 tablets by mouth daily      tamsulosin (FLOMAX) 0.4 MG capsule Take 1 capsule by mouth daily 90 capsule 1    [DISCONTINUED] ibuprofen (ADVIL;MOTRIN) 600 MG tablet Take 1 tablet by mouth every 8 hours as needed for Pain 30 tablet 0    metFORMIN (GLUCOPHAGE) 500 MG tablet Take 500 mg by mouth 2 times daily (with meals)      [DISCONTINUED] naproxen (NAPROSYN) 500 MG tablet Take 1 tablet by mouth 2 times daily (with meals) 30 tablet 0     No current facility-administered medications on file prior to visit. Pertinent items are noted in HPI  Review of systems reviewed from Patient History Form and available in the patient's chart under the Media tab. No change noted. PHYSICAL EXAMINATION:  Mr. Thiago Haynes is a very pleasant 61 y.o. [place] male who presents today in no acute distress, awake, alert, and oriented. He is well dressed, nourished and  groomed. Patient with normal affect. Height is  5' 3\" (1.6 m), weight is 161 lb (73 kg), Body mass index is 28.52 kg/m². Resting respiratory rate is 16. Examination of the gait, showed that the patient walks with no limp . Examination of both upper extremities showing a good range of motion with no triggering of the right long (middle) finger compare to the other side. There is mild swelling that can be seen with tenderness over A1 pulley. Examination of the gait, showed that the patient walks heel-toe with a non-antalgic gait and no limp. On evaluation of the left shoulder, range of motion is 90 degree in flexion and 90 degree in abduction. There is positve impingement signs.  Motor and sensation is intact and symmetric throughout the bilateral upper extremities in the median, ulnar and radial nerve distributions. He has 2+ radial pulses bilaterally. IMAGING: Teri Rahman were reviewed, taken 2/24/2022 in the office, 3 views of the right hand, and showed no fracture. X-rays were taken 1/30/2023, 3 views of the left shoulder, and showed no acute fracture. Mild cuff arthropathy. IMPRESSION:  1- Right long (middle) finger pain/ trigger finger, surgery 3/3/2022. 2- Left shoulder pain/ cuff tendinopathy/ impingement syndrome. PLAN:  I discussed with [address] the treatment options including both surgical and non-surgical treatment, and that my recommendation is to work on ROM and muscle exercises left shoulder. I believe he may benefit from prednisone taper, Rx sent. I discussed with the patient that I think that he would really benefit from a course of physical therapy for further strengthening and stretching. He would like to do it on his own. F/U in 2 months      The patient smokes cigarettes, and we discussed with the patient the risks of smoking on general health and also on bone and soft tissue healing (delay and non-union), and promised to cut down or stop smoking. Smoking: Educated the patient regarding the hazards of smoking and that it harms their body in many ways. It increases the chance of developing heart disease, lung disease, cancer, and other health problems including poor bone and wound healing. The importance of smoking cessation for optimal bone and wound healing was stressed. This was communicated verbally, 5 Minutes.       Todd Machuca MD

## 2023-02-09 NOTE — LETTER
[facility]  [address]  [address]  [address]  Phone: [phone]  Fax: [phone]    Gray Anton MD        February 9, 2023     Patient: Elvia Gary   YOB: 1960   Date of Visit: 2/9/2023       To Whom it May Concern: Ayleen Rich was seen in my clinic on 2/9/2023. Please excuse for time missed at work. He was accompanied by Sital today. If you have any questions or concerns, please don't hesitate to call.     Sincerely,         Gray Anton MD

## 2023-03-09 RX ORDER — PREDNISONE 10 MG/1
TABLET ORAL
Qty: 26 TABLET | Refills: 0 | OUTPATIENT
Start: 2023-03-09

## 2023-03-15 RX ORDER — PREDNISONE 10 MG/1
TABLET ORAL
Qty: 26 TABLET | Refills: 0 | OUTPATIENT
Start: 2023-03-15

## 2023-05-09 ENCOUNTER — HOSPITAL ENCOUNTER (EMERGENCY)
Age: 63
Discharge: HOME | End: 2023-05-09
Payer: MEDICAID

## 2023-05-09 ENCOUNTER — APPOINTMENT (OUTPATIENT)
Dept: GENERAL RADIOLOGY | Age: 63
End: 2023-05-09
Payer: MEDICAID

## 2023-05-09 VITALS
OXYGEN SATURATION: 98 % | BODY MASS INDEX: 30.29 KG/M2 | WEIGHT: 171 LBS | HEART RATE: 95 BPM | SYSTOLIC BLOOD PRESSURE: 122 MMHG | DIASTOLIC BLOOD PRESSURE: 78 MMHG | TEMPERATURE: 98.3 F | RESPIRATION RATE: 20 BRPM

## 2023-05-09 DIAGNOSIS — R60.9 PERIPHERAL EDEMA: Primary | ICD-10-CM

## 2023-05-09 LAB
ALBUMIN SERPL-MCNC: 4.5 G/DL (ref 3.4–5)
ALBUMIN/GLOB SERPL: 1.5 {RATIO} (ref 1.1–2.2)
ALP SERPL-CCNC: 128 U/L (ref 40–129)
ALT SERPL-CCNC: 32 U/L (ref 10–40)
ANION GAP SERPL CALCULATED.3IONS-SCNC: 13 MMOL/L (ref 3–16)
AST SERPL-CCNC: 25 U/L (ref 15–37)
BASOPHILS # BLD: 0.1 K/UL (ref 0–0.2)
BASOPHILS NFR BLD: 1 %
BILIRUB SERPL-MCNC: 0.5 MG/DL (ref 0–1)
BUN SERPL-MCNC: 19 MG/DL (ref 7–20)
CALCIUM SERPL-MCNC: 9.5 MG/DL (ref 8.3–10.6)
CHLORIDE SERPL-SCNC: 107 MMOL/L (ref 99–110)
CO2 SERPL-SCNC: 20 MMOL/L (ref 21–32)
CREAT SERPL-MCNC: 1.2 MG/DL (ref 0.8–1.3)
DEPRECATED RDW RBC AUTO: 14.8 % (ref 12.4–15.4)
EOSINOPHIL # BLD: 0.8 K/UL (ref 0–0.6)
EOSINOPHIL NFR BLD: 9 %
GFR SERPLBLD CREATININE-BSD FMLA CKD-EPI: >60 ML/MIN/{1.73_M2}
GLUCOSE SERPL-MCNC: 125 MG/DL (ref 70–99)
HCT VFR BLD AUTO: 39.9 % (ref 40.5–52.5)
HGB BLD-MCNC: 13.2 G/DL (ref 13.5–17.5)
LYMPHOCYTES # BLD: 2.3 K/UL (ref 1–5.1)
LYMPHOCYTES NFR BLD: 26.6 %
MCH RBC QN AUTO: 29.2 PG (ref 26–34)
MCHC RBC AUTO-ENTMCNC: 33.1 G/DL (ref 31–36)
MCV RBC AUTO: 88.1 FL (ref 80–100)
MONOCYTES # BLD: 0.7 K/UL (ref 0–1.3)
MONOCYTES NFR BLD: 7.6 %
NEUTROPHILS # BLD: 4.9 K/UL (ref 1.7–7.7)
NEUTROPHILS NFR BLD: 55.8 %
NT-PROBNP SERPL-MCNC: <36 PG/ML (ref 0–124)
PLATELET # BLD AUTO: 260 K/UL (ref 135–450)
PMV BLD AUTO: 8.4 FL (ref 5–10.5)
POTASSIUM SERPL-SCNC: 4.4 MMOL/L (ref 3.5–5.1)
PROT SERPL-MCNC: 7.5 G/DL (ref 6.4–8.2)
RBC # BLD AUTO: 4.53 M/UL (ref 4.2–5.9)
SODIUM SERPL-SCNC: 140 MMOL/L (ref 136–145)
TROPONIN, HIGH SENSITIVITY: 7 NG/L (ref 0–22)
WBC # BLD AUTO: 8.8 K/UL (ref 4–11)

## 2023-05-09 PROCEDURE — 93005 ELECTROCARDIOGRAM TRACING: CPT | Performed by: PHYSICIAN ASSISTANT

## 2023-05-09 PROCEDURE — 80053 COMPREHEN METABOLIC PANEL: CPT

## 2023-05-09 PROCEDURE — 99285 EMERGENCY DEPT VISIT HI MDM: CPT

## 2023-05-09 PROCEDURE — 84484 ASSAY OF TROPONIN QUANT: CPT

## 2023-05-09 PROCEDURE — 85025 COMPLETE CBC W/AUTO DIFF WBC: CPT

## 2023-05-09 PROCEDURE — 6370000000 HC RX 637 (ALT 250 FOR IP): Performed by: PHYSICIAN ASSISTANT

## 2023-05-09 PROCEDURE — 83880 ASSAY OF NATRIURETIC PEPTIDE: CPT

## 2023-05-09 PROCEDURE — 71045 X-RAY EXAM CHEST 1 VIEW: CPT

## 2023-05-09 RX ORDER — FUROSEMIDE 40 MG/1
20 TABLET ORAL ONCE
Status: COMPLETED | OUTPATIENT
Start: 2023-05-09 | End: 2023-05-09

## 2023-05-09 RX ORDER — HYDROCHLOROTHIAZIDE 25 MG/1
25 TABLET ORAL DAILY
Qty: 30 TABLET | Refills: 0 | Status: SHIPPED | OUTPATIENT
Start: 2023-05-09

## 2023-05-09 RX ADMIN — FUROSEMIDE 20 MG: 40 TABLET ORAL at 17:22

## 2023-05-09 ASSESSMENT — LIFESTYLE VARIABLES: HOW OFTEN DO YOU HAVE A DRINK CONTAINING ALCOHOL: NEVER

## 2023-05-09 ASSESSMENT — PAIN DESCRIPTION - PAIN TYPE: TYPE: ACUTE PAIN

## 2023-05-09 ASSESSMENT — PAIN DESCRIPTION - ORIENTATION: ORIENTATION: LEFT

## 2023-05-09 ASSESSMENT — PAIN SCALES - GENERAL: PAINLEVEL_OUTOF10: 7

## 2023-05-09 ASSESSMENT — PAIN DESCRIPTION - LOCATION: LOCATION: LEG

## 2023-05-09 ASSESSMENT — PAIN - FUNCTIONAL ASSESSMENT: PAIN_FUNCTIONAL_ASSESSMENT: 0-10

## 2023-05-09 NOTE — ED PROVIDER NOTES
edema. No erythema, edema, calf tenderness. Low concern for DVT with bilateral symptoms. Chest x-ray personally reviewed, cardiomegaly present, mild pulmonary vascular congestion without large pleural effusion or pulmonary edema. Low concern for DVT, PE, CHF. Troponin negative, EKG normal sinus, unchanged from January, doubt ACS. Disposition Considerations (tests considered but not done, Admit vs D/C, Shared Decision Making, Pt Expectation of Test or Tx.):       I am the Primary Clinician of Record. FINAL IMPRESSION      1. Peripheral edema          DISPOSITION/PLAN     DISPOSITION Decision To Discharge 05/09/2023 06:04:49 PM      PATIENT REFERRED TO:  KANDACE Dias - CNP  [address] [place] [address]  [phone]    In 2 days  Return for any new or worsening symptoms. DISCHARGE MEDICATIONS:  New Prescriptions    HYDROCHLOROTHIAZIDE (HYDRODIURIL) 25 MG TABLET    Take 1 tablet by mouth daily       DISCONTINUED MEDICATIONS:  Discontinued Medications    No medications on file              (Please note that portions of this note were completed with a voice recognition program.  Efforts were made to edit the dictations but occasionally words are mis-transcribed. )    Rivera Turcios PA-C (electronically signed)           Rivera Turcios PA-C  05/09/23 0839

## 2023-05-10 LAB
EKG ATRIAL RATE: 87 BPM
EKG DIAGNOSIS: NORMAL
EKG P AXIS: 39 DEGREES
EKG P-R INTERVAL: 192 MS
EKG Q-T INTERVAL: 346 MS
EKG QRS DURATION: 96 MS
EKG QTC CALCULATION (BAZETT): 416 MS
EKG R AXIS: 33 DEGREES
EKG T AXIS: 12 DEGREES
EKG VENTRICULAR RATE: 87 BPM

## 2023-05-10 PROCEDURE — 93010 ELECTROCARDIOGRAM REPORT: CPT | Performed by: INTERNAL MEDICINE

## 2024-03-28 ENCOUNTER — HOSPITAL ENCOUNTER (OUTPATIENT)
Dept: ULTRASOUND IMAGING | Age: 64
Discharge: HOME | End: 2024-03-28
Payer: COMMERCIAL

## 2024-03-28 DIAGNOSIS — R10.11 RUQ ABDOMINAL PAIN: ICD-10-CM

## 2024-03-28 PROCEDURE — 76705 ECHO EXAM OF ABDOMEN: CPT

## 2024-05-17 ENCOUNTER — HOSPITAL ENCOUNTER (EMERGENCY)
Age: 64
Discharge: HOME | End: 2024-05-17
Payer: COMMERCIAL

## 2024-05-17 VITALS
RESPIRATION RATE: 18 BRPM | HEART RATE: 75 BPM | TEMPERATURE: 98.4 F | HEIGHT: 63 IN | DIASTOLIC BLOOD PRESSURE: 76 MMHG | WEIGHT: 165 LBS | OXYGEN SATURATION: 99 % | BODY MASS INDEX: 29.23 KG/M2 | SYSTOLIC BLOOD PRESSURE: 129 MMHG

## 2024-05-17 DIAGNOSIS — H00.025 HORDEOLUM INTERNUM OF LEFT LOWER EYELID: Primary | ICD-10-CM

## 2024-05-17 PROCEDURE — 99283 EMERGENCY DEPT VISIT LOW MDM: CPT

## 2024-05-17 PROCEDURE — 6370000000 HC RX 637 (ALT 250 FOR IP): Performed by: PHYSICIAN ASSISTANT

## 2024-05-17 PROCEDURE — 6370000000 HC RX 637 (ALT 250 FOR IP)

## 2024-05-17 PROCEDURE — 2500000003 HC RX 250 WO HCPCS

## 2024-05-17 RX ORDER — SULFACETAMIDE SODIUM 100 MG/ML
1 SOLUTION/ DROPS OPHTHALMIC ONCE
Status: COMPLETED | OUTPATIENT
Start: 2024-05-17 | End: 2024-05-17

## 2024-05-17 RX ORDER — BALANCED SALT SOLUTION ENRICHED WITH BICARBONATE, DEXTROSE, AND GLUTATHIONE
KIT INTRAOCULAR
Status: DISCONTINUED
Start: 2024-05-17 | End: 2024-05-17 | Stop reason: WASHOUT

## 2024-05-17 RX ORDER — PROPARACAINE HYDROCHLORIDE 5 MG/ML
SOLUTION/ DROPS OPHTHALMIC
Status: COMPLETED
Start: 2024-05-17 | End: 2024-05-17

## 2024-05-17 RX ADMIN — FLUORESCEIN SODIUM 1 MG: 1 STRIP OPHTHALMIC at 18:14

## 2024-05-17 RX ADMIN — SULFACETAMIDE SODIUM 1 DROP: 100 SOLUTION/ DROPS OPHTHALMIC at 18:13

## 2024-05-17 RX ADMIN — PROPARACAINE HYDROCHLORIDE: 5 SOLUTION/ DROPS OPHTHALMIC at 18:12

## 2024-05-17 ASSESSMENT — ENCOUNTER SYMPTOMS
DIARRHEA: 0
COLOR CHANGE: 0
NAUSEA: 0
EYE REDNESS: 0
SHORTNESS OF BREATH: 0
EYE PAIN: 1
ABDOMINAL PAIN: 0
BACK PAIN: 0
EYE ITCHING: 0
PHOTOPHOBIA: 0
CONSTIPATION: 0
VOMITING: 0
EYE DISCHARGE: 1

## 2024-05-17 ASSESSMENT — VISUAL ACUITY
OS: 20/40
OD: 20/100
OU: 20/15
OU: 1

## 2024-05-17 ASSESSMENT — LIFESTYLE VARIABLES
HOW OFTEN DO YOU HAVE A DRINK CONTAINING ALCOHOL: NEVER
HOW MANY STANDARD DRINKS CONTAINING ALCOHOL DO YOU HAVE ON A TYPICAL DAY: PATIENT DOES NOT DRINK

## 2024-05-17 ASSESSMENT — PAIN - FUNCTIONAL ASSESSMENT: PAIN_FUNCTIONAL_ASSESSMENT: 0-10

## 2024-05-17 ASSESSMENT — TONOMETRY
OD_IOP_MMHG: 13
OS_IOP_MMHG: 12

## 2024-05-17 ASSESSMENT — PAIN SCALES - GENERAL: PAINLEVEL_OUTOF10: 6

## 2024-05-17 NOTE — ED PROVIDER NOTES
[facility] EMERGENCY DEPARTMENT  EMERGENCY DEPARTMENT ENCOUNTER        Pt Name: Jose Alejandro Mesa  MRN: [id number]  Birthdate 1960  Date of evaluation: 5/17/2024  Provider: Rickey Treviño PA-C  PCP: Kirstin Restrepo APRN - CNP  Note Started: 5:58 PM EDT 5/17/24      LAURA. I have evaluated this patient.        CHIEF COMPLAINT       Chief Complaint   Patient presents with    Eye Pain     Pt. Reports L eye pain and blurry vision       HISTORY OF PRESENT ILLNESS: 1 or more Elements     History from : Patient and daughter    Limitations to history : Language Slovak    Jose Alejandro Mesa is a 64 y.o. male who presents to the emergency department with left lower eyelid swelling which is impeding his vision when he turns his gaze to the left.  This does cause some pain and some watery discharge from the left eye.  He rates his pain to be a 6 out of 10 on pain scale.  He denies any preceding injury.  Denies headache, dizziness, headedness, skin rash, facial palsy, ear pain, tinnitus, purulent discharge, fever or chills.    Nursing Notes were all reviewed and agreed with or any disagreements were addressed in the HPI.    REVIEW OF SYSTEMS :      Review of Systems   Constitutional:  Negative for chills and fever.   HENT: Negative.     Eyes:  Positive for pain and discharge. Negative for photophobia, redness, itching and visual disturbance.   Respiratory:  Negative for shortness of breath.    Cardiovascular:  Negative for chest pain.   Gastrointestinal:  Negative for abdominal pain, constipation, diarrhea, nausea and vomiting.   Endocrine: Negative.    Genitourinary: Negative.    Musculoskeletal:  Negative for back pain, neck pain and neck stiffness.   Skin:  Negative for color change, pallor, rash and wound.   Neurological: Negative.    Psychiatric/Behavioral:  Negative for confusion.    All other systems reviewed and are negative.      Positives and Pertinent negatives as per HPI.     SURGICAL  HISTORY     Past Surgical History:   Procedure Laterality Date    FINGER TRIGGER RELEASE Right 3/3/2022    RIGHT LONG FINGER TRIGGER FINGER RELEASE performed by Monae Wahl MD at [facility] OR       CURRENTMEDICATIONS       Previous Medications    ACETAMINOPHEN (TYLENOL) 500 MG TABLET    Take 500 mg by mouth daily    ATORVASTATIN (LIPITOR) 80 MG TABLET    Take 1 tablet by mouth at bedtime    CARBOXYMETHYLCELLUL-GLYCERIN (CLEAR EYES FOR DRY EYES) 1-0.25 % SOLN    Apply 1 drop to eye daily    HYDROCHLOROTHIAZIDE (HYDRODIURIL) 25 MG TABLET    Take 1 tablet by mouth daily    METFORMIN (GLUCOPHAGE) 500 MG TABLET    Take 1 tablet by mouth 2 times daily (with meals)    PREDNISONE (DELTASONE) 10 MG TABLET    take 3 tabs PO BID x2 days, then 2 tabs PO BID x2 days, then 1 tab PO BID x2 days, then 1 tab PO daily x2 days    TAMSULOSIN (FLOMAX) 0.4 MG CAPSULE    Take 1 capsule by mouth daily       ALLERGIES     Lisinopril    FAMILYHISTORY     History reviewed. No pertinent family history.     SOCIAL HISTORY       Social History     Tobacco Use    Smoking status: Every Day    Smokeless tobacco: Never   Vaping Use    Vaping Use: Never used   Substance Use Topics    Alcohol use: No    Drug use: No       SCREENINGS        [place] Coma Scale  Eye Opening: Spontaneous  Best Verbal Response: Oriented  Best Motor Response: Obeys commands  [place] Coma Scale Score: 15                CIWA Assessment  BP: 129/76  Pulse: 75           PHYSICAL EXAM  1 or more Elements     ED Triage Vitals [05/17/24 1707]   BP Temp Temp Source Pulse Respirations SpO2 Height Weight - Scale   129/76 98.4 °F (36.9 °C) Oral 75 18 99 % 1.6 m (5' 3\") 74.8 kg (165 lb)       Physical Exam  Vitals and nursing note reviewed.   Constitutional:       Appearance: Normal appearance. He is well-developed. He is not toxic-appearing or diaphoretic.   HENT:      Head: Normocephalic and atraumatic.      Right Ear: External ear normal.      Left Ear: External ear normal.

## 2024-05-17 NOTE — DISCHARGE INSTRUCTIONS
Use the Bleph-10 eye drops, one drop in affected eye every two hours while awake today then 4 times a day for the next 5 to 7 days.

## 2024-06-17 ENCOUNTER — OFFICE VISIT (OUTPATIENT)
Dept: PRIMARY CARE CLINIC | Age: 64
End: 2024-06-17
Payer: COMMERCIAL

## 2024-06-17 VITALS
OXYGEN SATURATION: 97 % | SYSTOLIC BLOOD PRESSURE: 120 MMHG | RESPIRATION RATE: 18 BRPM | BODY MASS INDEX: 31.25 KG/M2 | HEIGHT: 62 IN | HEART RATE: 85 BPM | TEMPERATURE: 98.2 F | WEIGHT: 169.8 LBS | DIASTOLIC BLOOD PRESSURE: 72 MMHG

## 2024-06-17 DIAGNOSIS — M51.36 DDD (DEGENERATIVE DISC DISEASE), LUMBAR: ICD-10-CM

## 2024-06-17 DIAGNOSIS — Z23 NEED FOR PNEUMOCOCCAL VACCINATION: ICD-10-CM

## 2024-06-17 DIAGNOSIS — R79.0 LOW MAGNESIUM LEVEL: ICD-10-CM

## 2024-06-17 DIAGNOSIS — E78.49 OTHER HYPERLIPIDEMIA: ICD-10-CM

## 2024-06-17 DIAGNOSIS — Z12.5 SCREENING PSA (PROSTATE SPECIFIC ANTIGEN): ICD-10-CM

## 2024-06-17 DIAGNOSIS — I10 PRIMARY HYPERTENSION: ICD-10-CM

## 2024-06-17 DIAGNOSIS — R53.83 OTHER FATIGUE: ICD-10-CM

## 2024-06-17 DIAGNOSIS — Z11.59 ENCOUNTER FOR HEPATITIS C SCREENING TEST FOR LOW RISK PATIENT: ICD-10-CM

## 2024-06-17 DIAGNOSIS — E55.9 VITAMIN D DEFICIENCY: ICD-10-CM

## 2024-06-17 DIAGNOSIS — M25.562 CHRONIC PAIN OF LEFT KNEE: ICD-10-CM

## 2024-06-17 DIAGNOSIS — Z11.4 ENCOUNTER FOR SCREENING FOR HIV: ICD-10-CM

## 2024-06-17 DIAGNOSIS — E11.9 TYPE 2 DIABETES MELLITUS WITHOUT COMPLICATION, WITHOUT LONG-TERM CURRENT USE OF INSULIN (HCC): ICD-10-CM

## 2024-06-17 DIAGNOSIS — E78.00 PURE HYPERCHOLESTEROLEMIA: ICD-10-CM

## 2024-06-17 DIAGNOSIS — F17.210 CIGARETTE SMOKER: ICD-10-CM

## 2024-06-17 DIAGNOSIS — E11.9 TYPE 2 DIABETES MELLITUS WITHOUT COMPLICATION, WITHOUT LONG-TERM CURRENT USE OF INSULIN (HCC): Primary | ICD-10-CM

## 2024-06-17 DIAGNOSIS — M25.512 CHRONIC LEFT SHOULDER PAIN: ICD-10-CM

## 2024-06-17 DIAGNOSIS — G89.29 CHRONIC LEFT SHOULDER PAIN: ICD-10-CM

## 2024-06-17 DIAGNOSIS — G89.29 CHRONIC PAIN OF LEFT KNEE: ICD-10-CM

## 2024-06-17 PROBLEM — M51.369 DDD (DEGENERATIVE DISC DISEASE), LUMBAR: Status: ACTIVE | Noted: 2024-06-17

## 2024-06-17 LAB
25(OH)D3 SERPL-MCNC: 36.7 NG/ML
ALBUMIN SERPL-MCNC: 4.4 G/DL (ref 3.4–5)
ALBUMIN/GLOB SERPL: 1.8 {RATIO} (ref 1.1–2.2)
ALP SERPL-CCNC: 130 U/L (ref 40–129)
ALT SERPL-CCNC: 52 U/L (ref 10–40)
ANION GAP SERPL CALCULATED.3IONS-SCNC: 10 MMOL/L (ref 3–16)
AST SERPL-CCNC: 34 U/L (ref 15–37)
BASOPHILS # BLD: 0.1 K/UL (ref 0–0.2)
BASOPHILS NFR BLD: 0.8 %
BILIRUB SERPL-MCNC: <0.2 MG/DL (ref 0–1)
BUN SERPL-MCNC: 21 MG/DL (ref 7–20)
CALCIUM SERPL-MCNC: 9.4 MG/DL (ref 8.3–10.6)
CHLORIDE SERPL-SCNC: 108 MMOL/L (ref 99–110)
CHOLEST SERPL-MCNC: 118 MG/DL (ref 0–199)
CO2 SERPL-SCNC: 22 MMOL/L (ref 21–32)
CREAT SERPL-MCNC: 1.5 MG/DL (ref 0.8–1.3)
CREAT UR-MCNC: 87.7 MG/DL (ref 39–259)
DEPRECATED RDW RBC AUTO: 14.3 % (ref 12.4–15.4)
EOSINOPHIL # BLD: 0.8 K/UL (ref 0–0.6)
EOSINOPHIL NFR BLD: 9.7 %
GFR SERPLBLD CREATININE-BSD FMLA CKD-EPI: 52 ML/MIN/{1.73_M2}
GLUCOSE SERPL-MCNC: 104 MG/DL (ref 70–99)
HBA1C MFR BLD: 6.6 %
HCT VFR BLD AUTO: 33.4 % (ref 40.5–52.5)
HDLC SERPL-MCNC: 27 MG/DL (ref 40–60)
HGB BLD-MCNC: 11.4 G/DL (ref 13.5–17.5)
LDLC SERPL CALC-MCNC: ABNORMAL MG/DL
LDLC SERPL-MCNC: 51 MG/DL
LYMPHOCYTES # BLD: 2.1 K/UL (ref 1–5.1)
LYMPHOCYTES NFR BLD: 24.7 %
MAGNESIUM SERPL-MCNC: 1.5 MG/DL (ref 1.8–2.4)
MCH RBC QN AUTO: 30.3 PG (ref 26–34)
MCHC RBC AUTO-ENTMCNC: 34 G/DL (ref 31–36)
MCV RBC AUTO: 89.1 FL (ref 80–100)
MICROALBUMIN UR DL<=1MG/L-MCNC: <1.2 MG/DL
MICROALBUMIN/CREAT UR: NORMAL MG/G (ref 0–30)
MONOCYTES # BLD: 0.5 K/UL (ref 0–1.3)
MONOCYTES NFR BLD: 6.4 %
NEUTROPHILS # BLD: 4.9 K/UL (ref 1.7–7.7)
NEUTROPHILS NFR BLD: 58.4 %
PLATELET # BLD AUTO: 248 K/UL (ref 135–450)
PMV BLD AUTO: 9.1 FL (ref 5–10.5)
POTASSIUM SERPL-SCNC: 4.6 MMOL/L (ref 3.5–5.1)
PROT SERPL-MCNC: 6.8 G/DL (ref 6.4–8.2)
PSA SERPL DL<=0.01 NG/ML-MCNC: 0.5 NG/ML (ref 0–4)
RBC # BLD AUTO: 3.76 M/UL (ref 4.2–5.9)
SODIUM SERPL-SCNC: 140 MMOL/L (ref 136–145)
TRIGL SERPL-MCNC: 331 MG/DL (ref 0–150)
TSH SERPL DL<=0.005 MIU/L-ACNC: 2.2 UIU/ML (ref 0.27–4.2)
VLDLC SERPL CALC-MCNC: ABNORMAL MG/DL
WBC # BLD AUTO: 8.4 K/UL (ref 4–11)

## 2024-06-17 PROCEDURE — 3044F HG A1C LEVEL LT 7.0%: CPT | Performed by: INTERNAL MEDICINE

## 2024-06-17 PROCEDURE — G8427 DOCREV CUR MEDS BY ELIG CLIN: HCPCS | Performed by: INTERNAL MEDICINE

## 2024-06-17 PROCEDURE — 4004F PT TOBACCO SCREEN RCVD TLK: CPT | Performed by: INTERNAL MEDICINE

## 2024-06-17 PROCEDURE — 90677 PCV20 VACCINE IM: CPT | Performed by: INTERNAL MEDICINE

## 2024-06-17 PROCEDURE — G8417 CALC BMI ABV UP PARAM F/U: HCPCS | Performed by: INTERNAL MEDICINE

## 2024-06-17 PROCEDURE — 90471 IMMUNIZATION ADMIN: CPT | Performed by: INTERNAL MEDICINE

## 2024-06-17 PROCEDURE — 2022F DILAT RTA XM EVC RTNOPTHY: CPT | Performed by: INTERNAL MEDICINE

## 2024-06-17 PROCEDURE — 3074F SYST BP LT 130 MM HG: CPT | Performed by: INTERNAL MEDICINE

## 2024-06-17 PROCEDURE — 3078F DIAST BP <80 MM HG: CPT | Performed by: INTERNAL MEDICINE

## 2024-06-17 PROCEDURE — 83036 HEMOGLOBIN GLYCOSYLATED A1C: CPT | Performed by: INTERNAL MEDICINE

## 2024-06-17 PROCEDURE — 99204 OFFICE O/P NEW MOD 45 MIN: CPT | Performed by: INTERNAL MEDICINE

## 2024-06-17 PROCEDURE — 3017F COLORECTAL CA SCREEN DOC REV: CPT | Performed by: INTERNAL MEDICINE

## 2024-06-17 RX ORDER — CELECOXIB 200 MG/1
200 CAPSULE ORAL 2 TIMES DAILY
Qty: 60 CAPSULE | Refills: 1 | Status: SHIPPED | OUTPATIENT
Start: 2024-06-17

## 2024-06-17 RX ORDER — METHOCARBAMOL 750 MG/1
750 TABLET, FILM COATED ORAL 4 TIMES DAILY
Qty: 40 TABLET | Refills: 1 | Status: SHIPPED | OUTPATIENT
Start: 2024-06-17 | End: 2024-07-07

## 2024-06-17 SDOH — ECONOMIC STABILITY: INCOME INSECURITY: HOW HARD IS IT FOR YOU TO PAY FOR THE VERY BASICS LIKE FOOD, HOUSING, MEDICAL CARE, AND HEATING?: NOT HARD AT ALL

## 2024-06-17 SDOH — ECONOMIC STABILITY: HOUSING INSECURITY
IN THE LAST 12 MONTHS, WAS THERE A TIME WHEN YOU DID NOT HAVE A STEADY PLACE TO SLEEP OR SLEPT IN A SHELTER (INCLUDING NOW)?: NO

## 2024-06-17 SDOH — ECONOMIC STABILITY: FOOD INSECURITY: WITHIN THE PAST 12 MONTHS, THE FOOD YOU BOUGHT JUST DIDN'T LAST AND YOU DIDN'T HAVE MONEY TO GET MORE.: NEVER TRUE

## 2024-06-17 SDOH — ECONOMIC STABILITY: FOOD INSECURITY: WITHIN THE PAST 12 MONTHS, YOU WORRIED THAT YOUR FOOD WOULD RUN OUT BEFORE YOU GOT MONEY TO BUY MORE.: NEVER TRUE

## 2024-06-17 ASSESSMENT — ENCOUNTER SYMPTOMS
SORE THROAT: 0
TROUBLE SWALLOWING: 0
ABDOMINAL DISTENTION: 0
WHEEZING: 0
EYE REDNESS: 0
VOMITING: 0
ABDOMINAL PAIN: 0
BACK PAIN: 1
CONSTIPATION: 0
SHORTNESS OF BREATH: 0
COLOR CHANGE: 0
CHEST TIGHTNESS: 0
BLOOD IN STOOL: 0
SINUS PRESSURE: 0
NAUSEA: 0
EYE PAIN: 0
COUGH: 0
DIARRHEA: 0

## 2024-06-17 ASSESSMENT — PATIENT HEALTH QUESTIONNAIRE - PHQ9
1. LITTLE INTEREST OR PLEASURE IN DOING THINGS: NOT AT ALL
2. FEELING DOWN, DEPRESSED OR HOPELESS: NOT AT ALL
SUM OF ALL RESPONSES TO PHQ QUESTIONS 1-9: 0
SUM OF ALL RESPONSES TO PHQ9 QUESTIONS 1 & 2: 0
SUM OF ALL RESPONSES TO PHQ QUESTIONS 1-9: 0

## 2024-06-17 NOTE — PATIENT INSTRUCTIONS
[facility] Transportation Resources*  (Call 211 if need more resources.)       [facility] 211   Speak to a trained professional 24/7 who can connect you to essential community services including food, clothing, transportation, housing, utilities, employment services, childcare, and baby supplies. 211 serves nationwide.  [facility]org for resources in [place], [facility], [place] and [facility] in [place]; [place], [place], [place], and [facility] in [place].   [facility]org/resources for resources in [place], [place], [place], [place], [place], [place], [place], [place], [facility], [place], [place], and [facility] in [place].    Non-Emergency Transportation: Non-emergency medical transportation is for Medicaid members who do not have access to free transportation that suits their medical needs and need to be transported to a Medicaid-covered service performed by a Medicaid enrolled provider.     [facility]: [phone]  [facility]: [phone]  [facility]: [phone]  [facility]: [phone]  [facility]: [phone]  [facility]: [phone]. [phone]  [facility]: [phone]   [facility]: [place], [place], [place], [place], [place], [place], and [facility] [phone]  [place]: [phone] for non-managed Medicaid.      Public Transportation Services: Small fee may be associated with service  [facility] Access: [phone]  [place] [facility]: [phone]  [facility]: [phone], ext. [phone]  [facility] Transit Services: [phone]  [facility] Transit Services: [phone]  [facility] Transportation: [phone]  [place]- [facility] ([employer]): [phone]    [facility]   What they offer: Transportation Services also offers convenient group shopping trips. Seniors can socialize with their friends as they shop and complete other

## 2024-06-17 NOTE — PROGRESS NOTES
Jose Alejandro Mesa (:  1960) is a 64 y.o. male,New patient, here for evaluation of the following chief complaint(s):  New Patient (Left shoulder pain and left knee pain, stye under left eye)      SUBJECTIVE/OBJECTIVE:  HPI    This is a 64 y.o. male patient who comes in for establishment of care. The patient has a past medical history of -    1.  T2DM -  Patient's blood sugars have been stable. Patient denies complaints or symptoms today no polyuria or polydipsia. Patient mentions adherence with treatment.  Patient eats a low-carb diet, pays attention to food portion size.  Patient tries to exercise regularly.  Patient denies chest pain or shortness of breath with exertion or at rest.         2.  HTN  - Patient blood pressure is stable today. Patient denies complaints or symptoms today. Patient mentions he is adherent with treatment. Patient denies chest pain or shortness of breath with exertion or at rest. Adherent to low salt diet.       3. HLD - Patient denies complaints or symptoms of chest pain/shortness of breath with exertion or at rest.  Patient mentions they are adherent with treatment.  Patient follows a low-fat diet, tries to exercise.     Health Maintenance    Annual retinal eye exam - scheduled   Annual foot exam -  Annual Dentist visit -  Tobacco smoking - YES  Alcohol Misuse -  NO  Illicit Drug Use-  NO  Healthy Diet and physical activity -  Obesity Screen- screened  Wears seat belt-  YES  End of life directives discussed with patient.-Mentions he does not have  a will/or/an advanced directives.  Was instructed to start process looking into preparing his will an advanced directives.   The ASCVD Risk score (Bernard WISE, et al., 2019) failed to calculate for the following reasons:    Cannot find a previous HDL lab    Cannot find a previous total cholesterol lab     Health Maintenance Due   Topic Date Due    COVID-19 Vaccine (1) Never done    Diabetic foot exam  Never done    Lipids  Never done

## 2024-06-17 NOTE — ASSESSMENT & PLAN NOTE
Stable  Continue anti-hypertensive medication as documented in your medication list  HCTZ  25 mg  To verify blood pressure cuff accuracy  To keep outpatient BP log,  counseled on exercise and diet (including DASH diet)  Goal to achieve appropriate BMI  Patient agreed with plan with verbal understanding

## 2024-06-17 NOTE — ASSESSMENT & PLAN NOTE
Patient counseled on the benefits of smoking cessation including but not limited to reducing the incidence of lung cancer, COPD, jose-pharyngeal cancer, laryngeal cancer, Pancreatic cancer, kidney/bladder cancer, osteoporosis, Heart disease, CVA, PAD.

## 2024-06-17 NOTE — ASSESSMENT & PLAN NOTE
Stable and controlled  Continue medication as documented in your medication list   Atorvastatin 80 mg nightly

## 2024-06-17 NOTE — ASSESSMENT & PLAN NOTE
A1C:   Lab Results   Component Value Date/Time    LABA1C 6.6 06/17/2024 10:31 AM    LABA1C 7.0 09/21/2022 05:21 AM     Stable- continue home blood sugar monitoring  Counselled on Diet and exercise with goal to achieve appropriate BMI  Continue Diabetic medication as documented in medication list Metformin  500 mg twice a day  Continue home foot care  Patient agreed with plan with verbal understanding

## 2024-06-18 ENCOUNTER — TELEPHONE (OUTPATIENT)
Dept: PRIMARY CARE CLINIC | Age: 64
End: 2024-06-18

## 2024-06-18 DIAGNOSIS — D64.9 LOW HEMOGLOBIN: Primary | ICD-10-CM

## 2024-06-18 DIAGNOSIS — Z12.11 COLON CANCER SCREENING: ICD-10-CM

## 2024-06-18 DIAGNOSIS — E78.1 HIGH TRIGLYCERIDES: ICD-10-CM

## 2024-06-18 LAB
HIV 1+2 AB+HIV1 P24 AG SERPL QL IA: NORMAL
HIV 2 AB SERPL QL IA: NORMAL
HIV1 AB SERPL QL IA: NORMAL
HIV1 P24 AG SERPL QL IA: NORMAL

## 2024-06-18 RX ORDER — FENOFIBRATE 145 MG/1
145 TABLET, COATED ORAL DAILY
Qty: 30 TABLET | Refills: 5 | Status: SHIPPED | OUTPATIENT
Start: 2024-06-18

## 2024-06-18 NOTE — RESULT ENCOUNTER NOTE
Please let patient know that his kidneys have shown some worsening given he is diabetic we recommend he takes medications like Jardiance or Farxiga.  These drugs may help slow the down  progressing to chronic kidney disease caused by diabetes. These medicines get rid of blood sugar through your urine so you have to drink lots of water when you're on it.     Potential side effects include but are not limited to transient change in kidney numbers(increase water intake to lessen this), kidney/bladder infections, fungal groin infections, secondary bacterial skin infections including Necrotizing fasciitis (perineum)/Selina's gangrene, diabetic ketoacidosis (increased blood acid levels), pancreatitis. Allergies/anaphylaxis  which any medicine can cause. Like mention we have to confirm insurance coverage first before you start it.       His hemoglobin is a little bit low 11.4, I referred him to GI for colonoscopy also to assess low hemoglobin.  Please provide patient telephone number so he can schedule his appointment.      His triglycerides are elevated at 331 sent in a prescription for fenofibrate to help with this.    Magnesium is a little bit low 1.5 recommend first-line magnesium  Beans, peas, oats, spinach, bananas, fish like salmon.    Patient has follow-up pending if he does not hear from us that means it was negative.

## 2024-06-19 LAB
HCV AB S/CO SERPL IA: <0.02 IV
HCV AB SERPL QL IA: NEGATIVE

## 2024-06-24 ENCOUNTER — TELEPHONE (OUTPATIENT)
Dept: PRIMARY CARE CLINIC | Age: 64
End: 2024-06-24

## 2024-06-24 DIAGNOSIS — Z12.11 COLON CANCER SCREENING: Primary | ICD-10-CM

## 2024-06-24 NOTE — TELEPHONE ENCOUNTER
----- Message from Chris Pena MD sent at 6/24/2024  1:55 PM EDT -----  Regarding: Colon cancer screening  Please let patient know that we received correspondence from [employer] stating that they have not been able to get in touch with her. I have ordered Cologuard as alternative to colonoscopy.

## 2024-06-24 NOTE — TELEPHONE ENCOUNTER
LM message with Man with  to    let patient know that we received correspondence from [employer] stating that they have not been able to get in touch with him. Dr. Pena has ordered Cologuard as alternative to colonoscopy.

## 2024-06-26 ENCOUNTER — HOSPITAL ENCOUNTER (OUTPATIENT)
Dept: PHYSICAL THERAPY | Age: 64
Setting detail: THERAPIES SERIES
Discharge: HOME | End: 2024-06-26
Payer: COMMERCIAL

## 2024-06-26 DIAGNOSIS — M25.562 CHRONIC PAIN OF LEFT KNEE: ICD-10-CM

## 2024-06-26 DIAGNOSIS — G89.29 CHRONIC LEFT SHOULDER PAIN: Primary | ICD-10-CM

## 2024-06-26 DIAGNOSIS — M54.50 CHRONIC BILATERAL LOW BACK PAIN WITHOUT SCIATICA: ICD-10-CM

## 2024-06-26 DIAGNOSIS — M25.512 CHRONIC LEFT SHOULDER PAIN: Primary | ICD-10-CM

## 2024-06-26 DIAGNOSIS — G89.29 CHRONIC BILATERAL LOW BACK PAIN WITHOUT SCIATICA: ICD-10-CM

## 2024-06-26 DIAGNOSIS — G89.29 CHRONIC PAIN OF LEFT KNEE: ICD-10-CM

## 2024-06-26 DIAGNOSIS — M51.36 DDD (DEGENERATIVE DISC DISEASE), LUMBAR: ICD-10-CM

## 2024-06-26 PROCEDURE — 97110 THERAPEUTIC EXERCISES: CPT

## 2024-06-26 PROCEDURE — 97530 THERAPEUTIC ACTIVITIES: CPT

## 2024-06-26 PROCEDURE — 97161 PT EVAL LOW COMPLEX 20 MIN: CPT

## 2024-07-01 ENCOUNTER — OFFICE VISIT (OUTPATIENT)
Dept: ORTHOPEDIC SURGERY | Age: 64
End: 2024-07-01

## 2024-07-01 VITALS — RESPIRATION RATE: 16 BRPM | HEIGHT: 62 IN | BODY MASS INDEX: 31.1 KG/M2 | WEIGHT: 169 LBS

## 2024-07-01 DIAGNOSIS — M54.12 CERVICAL RADICULOPATHY: ICD-10-CM

## 2024-07-01 DIAGNOSIS — M75.42 SUBACROMIAL IMPINGEMENT OF LEFT SHOULDER: ICD-10-CM

## 2024-07-01 DIAGNOSIS — M75.32 CALCIFIC TENDINITIS OF LEFT SHOULDER: Primary | ICD-10-CM

## 2024-07-01 NOTE — PROGRESS NOTES
ORTHOPAEDIC OFFICE NOTE    Chief Complaint   Patient presents with    Shoulder Pain     Left shoulder pain          HPI  7/1/24 Israeli  present  64 y.o. male RHD seen in consultation at the request of Chris Pena MD for evaluation of left shoulder pain:  Third opinion  Previously saw Dr Wahl for this, then Dr Rodriguez at [facility]  He reports the symptoms have been going on for few years  The pain is mainly superiorly, into the neck  He does describe intermittent radiation, numbness and tingling in the left middle finger  He reports he had a corticosteroid injection in 2022 with reasonable relief  He is supposed to start formal physical therapy next week for this issue        Allergies   Allergen Reactions    Lisinopril Rash        Current Outpatient Medications   Medication Sig Dispense Refill    fenofibrate (TRICOR) 145 MG tablet Take 1 tablet by mouth daily 30 tablet 5    methocarbamol (ROBAXIN-750) 750 MG tablet Take 1 tablet by mouth 4 times daily for 20 days 40 tablet 1    celecoxib (CELEBREX) 200 MG capsule Take 1 capsule by mouth 2 times daily 60 capsule 1    hydroCHLOROthiazide (HYDRODIURIL) 25 MG tablet Take 1 tablet by mouth daily 30 tablet 0    acetaminophen (TYLENOL) 500 MG tablet Take 1 tablet by mouth daily      Carboxymethylcellul-Glycerin (CLEAR EYES FOR DRY EYES) 1-0.25 % SOLN Apply 1 drop to eye daily      atorvastatin (LIPITOR) 80 MG tablet Take 1 tablet by mouth at bedtime      tamsulosin (FLOMAX) 0.4 MG capsule Take 1 capsule by mouth daily 90 capsule 1    metFORMIN (GLUCOPHAGE) 500 MG tablet Take 1 tablet by mouth 2 times daily (with meals)       No current facility-administered medications for this visit.       Past Medical History:   Diagnosis Date    Diabetes mellitus (HCC)     Hypertension         Past Surgical History:   Procedure Laterality Date    FINGER TRIGGER RELEASE Right 3/3/2022    RIGHT LONG FINGER TRIGGER FINGER RELEASE performed by Monae Wahl MD at [facility] OR

## 2024-07-05 ENCOUNTER — APPOINTMENT (OUTPATIENT)
Dept: ULTRASOUND IMAGING | Age: 64
End: 2024-07-05
Payer: COMMERCIAL

## 2024-07-05 ENCOUNTER — HOSPITAL ENCOUNTER (EMERGENCY)
Age: 64
Discharge: HOME | End: 2024-07-05
Attending: EMERGENCY MEDICINE
Payer: COMMERCIAL

## 2024-07-05 VITALS
WEIGHT: 170 LBS | RESPIRATION RATE: 23 BRPM | BODY MASS INDEX: 31.28 KG/M2 | HEART RATE: 89 BPM | SYSTOLIC BLOOD PRESSURE: 120 MMHG | DIASTOLIC BLOOD PRESSURE: 92 MMHG | OXYGEN SATURATION: 97 % | TEMPERATURE: 98.4 F | HEIGHT: 62 IN

## 2024-07-05 DIAGNOSIS — R10.11 ABDOMINAL PAIN, RIGHT UPPER QUADRANT: Primary | ICD-10-CM

## 2024-07-05 LAB
ALBUMIN SERPL-MCNC: 5 G/DL (ref 3.4–5)
ALP SERPL-CCNC: 165 U/L (ref 40–129)
ALT SERPL-CCNC: 30 U/L (ref 10–40)
ANION GAP SERPL CALCULATED.3IONS-SCNC: 13 MMOL/L (ref 3–16)
AST SERPL-CCNC: 25 U/L (ref 15–37)
BASOPHILS # BLD: 0.1 K/UL (ref 0–0.2)
BASOPHILS NFR BLD: 0.9 %
BILIRUB DIRECT SERPL-MCNC: <0.2 MG/DL (ref 0–0.3)
BILIRUB INDIRECT SERPL-MCNC: ABNORMAL MG/DL (ref 0–1)
BILIRUB SERPL-MCNC: 0.5 MG/DL (ref 0–1)
BUN SERPL-MCNC: 22 MG/DL (ref 7–20)
CALCIUM SERPL-MCNC: 9.2 MG/DL (ref 8.3–10.6)
CHLORIDE SERPL-SCNC: 107 MMOL/L (ref 99–110)
CO2 SERPL-SCNC: 16 MMOL/L (ref 21–32)
CREAT SERPL-MCNC: 1.4 MG/DL (ref 0.8–1.3)
DEPRECATED RDW RBC AUTO: 14.6 % (ref 12.4–15.4)
EOSINOPHIL # BLD: 0.4 K/UL (ref 0–0.6)
EOSINOPHIL NFR BLD: 3.6 %
GFR SERPLBLD CREATININE-BSD FMLA CKD-EPI: 56 ML/MIN/{1.73_M2}
GLUCOSE SERPL-MCNC: 163 MG/DL (ref 70–99)
HCT VFR BLD AUTO: 39.4 % (ref 40.5–52.5)
HGB BLD-MCNC: 13 G/DL (ref 13.5–17.5)
LIPASE SERPL-CCNC: 45 U/L (ref 13–60)
LYMPHOCYTES # BLD: 2.1 K/UL (ref 1–5.1)
LYMPHOCYTES NFR BLD: 20.5 %
MCH RBC QN AUTO: 29.5 PG (ref 26–34)
MCHC RBC AUTO-ENTMCNC: 33 G/DL (ref 31–36)
MCV RBC AUTO: 89.3 FL (ref 80–100)
MONOCYTES # BLD: 0.5 K/UL (ref 0–1.3)
MONOCYTES NFR BLD: 5.1 %
NEUTROPHILS # BLD: 7.1 K/UL (ref 1.7–7.7)
NEUTROPHILS NFR BLD: 69.9 %
PLATELET # BLD AUTO: 288 K/UL (ref 135–450)
PMV BLD AUTO: 8.4 FL (ref 5–10.5)
POTASSIUM SERPL-SCNC: 4.3 MMOL/L (ref 3.5–5.1)
PROT SERPL-MCNC: 8.5 G/DL (ref 6.4–8.2)
RBC # BLD AUTO: 4.41 M/UL (ref 4.2–5.9)
SODIUM SERPL-SCNC: 136 MMOL/L (ref 136–145)
WBC # BLD AUTO: 10.1 K/UL (ref 4–11)

## 2024-07-05 PROCEDURE — 6360000002 HC RX W HCPCS: Performed by: EMERGENCY MEDICINE

## 2024-07-05 PROCEDURE — 85025 COMPLETE CBC W/AUTO DIFF WBC: CPT

## 2024-07-05 PROCEDURE — 76705 ECHO EXAM OF ABDOMEN: CPT

## 2024-07-05 PROCEDURE — 80076 HEPATIC FUNCTION PANEL: CPT

## 2024-07-05 PROCEDURE — 96375 TX/PRO/DX INJ NEW DRUG ADDON: CPT

## 2024-07-05 PROCEDURE — 83690 ASSAY OF LIPASE: CPT

## 2024-07-05 PROCEDURE — 2580000003 HC RX 258: Performed by: EMERGENCY MEDICINE

## 2024-07-05 PROCEDURE — 96374 THER/PROPH/DIAG INJ IV PUSH: CPT

## 2024-07-05 PROCEDURE — 80048 BASIC METABOLIC PNL TOTAL CA: CPT

## 2024-07-05 PROCEDURE — 99284 EMERGENCY DEPT VISIT MOD MDM: CPT

## 2024-07-05 RX ORDER — MORPHINE SULFATE 4 MG/ML
4 INJECTION, SOLUTION INTRAMUSCULAR; INTRAVENOUS ONCE
Status: COMPLETED | OUTPATIENT
Start: 2024-07-05 | End: 2024-07-05

## 2024-07-05 RX ORDER — ONDANSETRON 4 MG/1
4 TABLET, FILM COATED ORAL 3 TIMES DAILY PRN
Qty: 15 TABLET | Refills: 0 | Status: SHIPPED | OUTPATIENT
Start: 2024-07-05

## 2024-07-05 RX ORDER — OXYCODONE HYDROCHLORIDE AND ACETAMINOPHEN 5; 325 MG/1; MG/1
1 TABLET ORAL EVERY 6 HOURS PRN
Qty: 12 TABLET | Refills: 0 | Status: SHIPPED | OUTPATIENT
Start: 2024-07-05 | End: 2024-07-08

## 2024-07-05 RX ORDER — ONDANSETRON 2 MG/ML
4 INJECTION INTRAMUSCULAR; INTRAVENOUS ONCE
Status: COMPLETED | OUTPATIENT
Start: 2024-07-05 | End: 2024-07-05

## 2024-07-05 RX ORDER — 0.9 % SODIUM CHLORIDE 0.9 %
1000 INTRAVENOUS SOLUTION INTRAVENOUS ONCE
Status: COMPLETED | OUTPATIENT
Start: 2024-07-05 | End: 2024-07-05

## 2024-07-05 RX ADMIN — SODIUM CHLORIDE 1000 ML: 9 INJECTION, SOLUTION INTRAVENOUS at 13:14

## 2024-07-05 RX ADMIN — MORPHINE SULFATE 4 MG: 4 INJECTION, SOLUTION INTRAMUSCULAR; INTRAVENOUS at 13:14

## 2024-07-05 RX ADMIN — ONDANSETRON 4 MG: 2 INJECTION INTRAMUSCULAR; INTRAVENOUS at 13:14

## 2024-07-05 ASSESSMENT — PAIN DESCRIPTION - DIRECTION: RADIATING_TOWARDS: RIBS

## 2024-07-05 ASSESSMENT — PAIN SCALES - GENERAL: PAINLEVEL_OUTOF10: 8

## 2024-07-05 ASSESSMENT — PAIN - FUNCTIONAL ASSESSMENT: PAIN_FUNCTIONAL_ASSESSMENT: 0-10

## 2024-07-05 ASSESSMENT — PAIN DESCRIPTION - PAIN TYPE: TYPE: ACUTE PAIN

## 2024-07-05 ASSESSMENT — PAIN DESCRIPTION - LOCATION: LOCATION: ABDOMEN

## 2024-07-05 NOTE — ED PROVIDER NOTES
[facility] EMERGENCY DEPARTMENT    CHIEF COMPLAINT  Abdominal Pain (Used moziy - states has RUQ abd pain for the past 5-6 days with diarrhea and loss of appetite. Denies n/v and fevers. )       HISTORY OF PRESENT ILLNESS    All communication via use of moziy video     Jose Alejandro Mesa is a 64 y.o. male who presents to the ED with complaint of abdominal pain. Symptoms started about 5-6 days ago. The pain is predominantly RUQ and epigastric, burning. Better if he eats a cucumber. Worse in certain positions. Denies nausea, vomiting but complains of diarrhea. Stools are nonbloody. Denies fever but complains of mild cough. Denies recent travel.     I have reviewed the following from the nursing documentation:    Past Medical History:   Diagnosis Date    Diabetes mellitus (HCC)     Hypertension      Past Surgical History:   Procedure Laterality Date    FINGER TRIGGER RELEASE Right 3/3/2022    RIGHT LONG FINGER TRIGGER FINGER RELEASE performed by [facility] Wahl MD at [facility] OR     History reviewed. No pertinent family history.  Social History     Socioeconomic History    Marital status:      Spouse name: Not on file    Number of children: Not on file    Years of education: Not on file    Highest education level: Not on file   Occupational History    Not on file   Tobacco Use    Smoking status: Every Day    Smokeless tobacco: Never   Vaping Use    Vaping Use: Never used   Substance and Sexual Activity    Alcohol use: No    Drug use: No    Sexual activity: Not on file   Other Topics Concern    Not on file   Social History Narrative    Not on file     Social Determinants of Health     Financial Resource Strain: Low Risk  (6/17/2024)    Overall Financial Resource Strain (CARDIA)     Difficulty of Paying Living Expenses: Not hard at all   Food Insecurity: No Food Insecurity (6/17/2024)    Hunger Vital Sign     Worried About Running Out of Food in the Last Year: Never true     Ran Out

## 2024-07-05 NOTE — DISCHARGE INSTRUCTIONS
Dear Jose Alejandro Mesa,     Thank you for the privilege of caring for you today in the Emergency Department.     Follow up with General Surgery as your symptoms may be related to your gallbladder.     You have been prescribed an opiate medication, Oxycodone. Opiates can be addictive, even in small quantities. Take only what you need to bear your pain. Return any unused portion to the pharmacy from which you obtained it. Opiates may also be sedating. While this is not a problem under normal circumstances, when taken with alcohol or while driving or operating heavy machinery, this medicine could cause you to become too sleepy and/or hurt yourself or others. Therefore, it is very important that you DO NOT DRIVE, DRINK ALCOHOL, OR OPERATE HEAVY MACHINERY WHILE TAKING THE MEDICINE(S) LISTED ABOVE.     Please return to the Emergency Department if you develop any new or worsening symptoms, fever, chest pain, shortness of breath, inability to eat or drink, or for any other concerns.     Regards,     Michael Melgar MD, FACEP

## 2024-07-09 ENCOUNTER — OFFICE VISIT (OUTPATIENT)
Dept: SURGERY | Age: 64
End: 2024-07-09
Payer: COMMERCIAL

## 2024-07-09 VITALS
SYSTOLIC BLOOD PRESSURE: 118 MMHG | DIASTOLIC BLOOD PRESSURE: 70 MMHG | WEIGHT: 166 LBS | BODY MASS INDEX: 30.55 KG/M2 | HEIGHT: 62 IN

## 2024-07-09 DIAGNOSIS — K80.20 SYMPTOMATIC CHOLELITHIASIS: Primary | ICD-10-CM

## 2024-07-09 PROCEDURE — 3017F COLORECTAL CA SCREEN DOC REV: CPT | Performed by: SURGERY

## 2024-07-09 PROCEDURE — 3074F SYST BP LT 130 MM HG: CPT | Performed by: SURGERY

## 2024-07-09 PROCEDURE — G8417 CALC BMI ABV UP PARAM F/U: HCPCS | Performed by: SURGERY

## 2024-07-09 PROCEDURE — G8427 DOCREV CUR MEDS BY ELIG CLIN: HCPCS | Performed by: SURGERY

## 2024-07-09 PROCEDURE — 4004F PT TOBACCO SCREEN RCVD TLK: CPT | Performed by: SURGERY

## 2024-07-09 PROCEDURE — 99203 OFFICE O/P NEW LOW 30 MIN: CPT | Performed by: SURGERY

## 2024-07-09 PROCEDURE — 3078F DIAST BP <80 MM HG: CPT | Performed by: SURGERY

## 2024-07-09 RX ORDER — INDOCYANINE GREEN AND WATER 25 MG
5 KIT INJECTION ONCE
OUTPATIENT
Start: 2024-07-09 | End: 2024-07-09

## 2024-07-09 RX ORDER — SODIUM CHLORIDE 0.9 % (FLUSH) 0.9 %
5-40 SYRINGE (ML) INJECTION PRN
OUTPATIENT
Start: 2024-07-09

## 2024-07-09 RX ORDER — SODIUM CHLORIDE 0.9 % (FLUSH) 0.9 %
5-40 SYRINGE (ML) INJECTION EVERY 12 HOURS SCHEDULED
OUTPATIENT
Start: 2024-07-09

## 2024-07-09 RX ORDER — SODIUM CHLORIDE 9 MG/ML
INJECTION, SOLUTION INTRAVENOUS PRN
OUTPATIENT
Start: 2024-07-09

## 2024-07-09 ASSESSMENT — ENCOUNTER SYMPTOMS
APNEA: 0
VOMITING: 0
BACK PAIN: 0
ABDOMINAL PAIN: 1
NAUSEA: 0
EYE ITCHING: 0
CHEST TIGHTNESS: 0
COLOR CHANGE: 0
ABDOMINAL DISTENTION: 0
EYE DISCHARGE: 0

## 2024-07-09 NOTE — PATIENT INSTRUCTIONS
1. We discussed the risks, benefits and alternatives of a minimally invasive cholecystectomy with cholangiogram as well as open cholecystectomy. Specific risks discussed include bleeding, infection, injury to surrounding structures, possible requirement of additional procedures, and conversion to open, as well as the perioperative risks associated with general anesthesia. Benefits include relief from current abdominal symptoms. Alternatives include observation with medical management. Details of the procedure were discussed and all questions answered. The patient understands, agrees, and wishes to proceed with the minimally invasive procedure.  2. In the interim, the patient is to avoid high fat foods, which are known triggers for biliary colic  3. Will schedule for robot assisted laparoscopic cholecystectomy with cholangiogram (79681) with general anesthesia as outpatient procedure

## 2024-07-09 NOTE — PROGRESS NOTES
(HYDRODIURIL) 25 MG tablet Take 1 tablet by mouth daily 30 tablet 0    acetaminophen (TYLENOL) 500 MG tablet Take 1 tablet by mouth daily      Carboxymethylcellul-Glycerin (CLEAR EYES FOR DRY EYES) 1-0.25 % SOLN Apply 1 drop to eye daily      atorvastatin (LIPITOR) 80 MG tablet Take 1 tablet by mouth at bedtime      tamsulosin (FLOMAX) 0.4 MG capsule Take 1 capsule by mouth daily 90 capsule 1    metFORMIN (GLUCOPHAGE) 500 MG tablet Take 1 tablet by mouth 2 times daily (with meals)       No current facility-administered medications for this visit.      Allergies   Allergen Reactions    Lisinopril Rash        OBJECTIVE:  /70   Ht 1.575 m (5' 2\")   Wt 75.3 kg (166 lb)   BMI 30.36 kg/m²    Physical Exam  Constitutional:       General: He is not in acute distress.     Appearance: He is well-developed. He is not diaphoretic.   HENT:      Head: Normocephalic and atraumatic.   Eyes:      Conjunctiva/sclera: Conjunctivae normal.      Pupils: Pupils are equal, round, and reactive to light.   Neck:      Vascular: No JVD.   Cardiovascular:      Rate and Rhythm: Normal rate and regular rhythm.      Heart sounds: Normal heart sounds.   Pulmonary:      Effort: Pulmonary effort is normal. No respiratory distress.      Breath sounds: Normal breath sounds. No wheezing.   Abdominal:      General: Bowel sounds are normal. There is no distension.      Palpations: Abdomen is soft. There is no mass.      Tenderness: There is abdominal tenderness. There is no guarding.       Musculoskeletal:      Cervical back: Normal range of motion and neck supple.   Lymphadenopathy:      Cervical: No cervical adenopathy.   Skin:     General: Skin is warm and dry.      Findings: No erythema or rash.   Neurological:      Mental Status: He is alert and oriented to person, place, and time.   Psychiatric:         Behavior: Behavior normal.         Thought Content: Thought content normal.         Judgment: Judgment normal.          Labs:  Admission

## 2024-07-10 ENCOUNTER — PREP FOR PROCEDURE (OUTPATIENT)
Dept: SURGERY | Age: 64
End: 2024-07-10

## 2024-07-11 NOTE — PROGRESS NOTES
Patient reached.  Preop instructions given via [employer]  [id number] to daughter Dudley Kumari. Number  [phone]______________  Daughter does speak English.    -Date_7/16/2024______time_0730______arrival_0600  [facility]___________  -Nothing to eat or drink after midnight  -Responsible adult 18 or older to stay on site while you are here and drive you home and stay with you after  -Follow any instructions your doctors office has given you  -Bring a complete list of all your medications and supplements  -If you normally take the following medications in the morning please do so with a small    sip of water-heart,blood pressure,seizure,breathing or thyroid-avoid water pilll Do not take blood pressure medications ending in \"will\" or \"pril\" the AM of surgery or the racquel prior  -You may use your inhalers  -Take half of your normal dose of any long acting insulins the night before-do not take    any diabetic medications in the morning  -Follow your doctors instructions regarding blood thinners  -Any questions call your surgeons office            VISITOR POLICY(subject to change)    Current policy is 2 visitors per patient. No children. Masks at discretion of facility. Visiting hours are 8a-8p. Overnight visitors will be at the discretion of the nurse. All policies are subject to change.

## 2024-07-11 NOTE — PROGRESS NOTES
ARRANGED    Company _[employer]_    Language _Nepali_____    Date _7/16/2024________    Arrival Time __0600____    Length of time _4 hours_____    Place _Main____    Confirmation Number _[id number]_____

## 2024-07-16 ENCOUNTER — HOSPITAL ENCOUNTER (OUTPATIENT)
Age: 64
Setting detail: OUTPATIENT SURGERY
Discharge: HOME | End: 2024-07-16
Attending: SURGERY | Admitting: SURGERY
Payer: COMMERCIAL

## 2024-07-16 ENCOUNTER — APPOINTMENT (OUTPATIENT)
Dept: GENERAL RADIOLOGY | Age: 64
End: 2024-07-16
Attending: SURGERY
Payer: COMMERCIAL

## 2024-07-16 ENCOUNTER — ANESTHESIA (OUTPATIENT)
Dept: OPERATING ROOM | Age: 64
End: 2024-07-16
Payer: COMMERCIAL

## 2024-07-16 ENCOUNTER — ANESTHESIA EVENT (OUTPATIENT)
Dept: OPERATING ROOM | Age: 64
End: 2024-07-16
Payer: COMMERCIAL

## 2024-07-16 VITALS
HEART RATE: 86 BPM | HEIGHT: 62 IN | OXYGEN SATURATION: 96 % | BODY MASS INDEX: 30.33 KG/M2 | WEIGHT: 164.8 LBS | DIASTOLIC BLOOD PRESSURE: 90 MMHG | RESPIRATION RATE: 18 BRPM | TEMPERATURE: 96.8 F | SYSTOLIC BLOOD PRESSURE: 158 MMHG

## 2024-07-16 DIAGNOSIS — K80.20 SYMPTOMATIC CHOLELITHIASIS: Primary | ICD-10-CM

## 2024-07-16 LAB
ANION GAP SERPL CALCULATED.3IONS-SCNC: 12 MMOL/L (ref 3–16)
BUN SERPL-MCNC: 17 MG/DL (ref 7–20)
CALCIUM SERPL-MCNC: 9.2 MG/DL (ref 8.3–10.6)
CHLORIDE SERPL-SCNC: 112 MMOL/L (ref 99–110)
CO2 SERPL-SCNC: 20 MMOL/L (ref 21–32)
CREAT SERPL-MCNC: 1.3 MG/DL (ref 0.8–1.3)
GFR SERPLBLD CREATININE-BSD FMLA CKD-EPI: 61 ML/MIN/{1.73_M2}
GLUCOSE BLD-MCNC: 142 MG/DL (ref 70–99)
GLUCOSE SERPL-MCNC: 123 MG/DL (ref 70–99)
PERFORMED ON: ABNORMAL
POTASSIUM SERPL-SCNC: 4.2 MMOL/L (ref 3.5–5.1)
SODIUM SERPL-SCNC: 144 MMOL/L (ref 136–145)

## 2024-07-16 PROCEDURE — 6360000002 HC RX W HCPCS: Performed by: SURGERY

## 2024-07-16 PROCEDURE — A4217 STERILE WATER/SALINE, 500 ML: HCPCS | Performed by: SURGERY

## 2024-07-16 PROCEDURE — 6360000002 HC RX W HCPCS: Performed by: STUDENT IN AN ORGANIZED HEALTH CARE EDUCATION/TRAINING PROGRAM

## 2024-07-16 PROCEDURE — 2580000003 HC RX 258: Performed by: SURGERY

## 2024-07-16 PROCEDURE — 3600000019 HC SURGERY ROBOT ADDTL 15MIN: Performed by: SURGERY

## 2024-07-16 PROCEDURE — 2500000003 HC RX 250 WO HCPCS

## 2024-07-16 PROCEDURE — 3700000001 HC ADD 15 MINUTES (ANESTHESIA): Performed by: SURGERY

## 2024-07-16 PROCEDURE — S2900 ROBOTIC SURGICAL SYSTEM: HCPCS | Performed by: SURGERY

## 2024-07-16 PROCEDURE — 3700000000 HC ANESTHESIA ATTENDED CARE: Performed by: SURGERY

## 2024-07-16 PROCEDURE — 74300 X-RAY BILE DUCTS/PANCREAS: CPT

## 2024-07-16 PROCEDURE — 7100000001 HC PACU RECOVERY - ADDTL 15 MIN: Performed by: SURGERY

## 2024-07-16 PROCEDURE — C1894 INTRO/SHEATH, NON-LASER: HCPCS | Performed by: SURGERY

## 2024-07-16 PROCEDURE — 2500000003 HC RX 250 WO HCPCS: Performed by: NURSE ANESTHETIST, CERTIFIED REGISTERED

## 2024-07-16 PROCEDURE — 80048 BASIC METABOLIC PNL TOTAL CA: CPT

## 2024-07-16 PROCEDURE — 88304 TISSUE EXAM BY PATHOLOGIST: CPT

## 2024-07-16 PROCEDURE — 7100000010 HC PHASE II RECOVERY - FIRST 15 MIN: Performed by: SURGERY

## 2024-07-16 PROCEDURE — 36415 COLL VENOUS BLD VENIPUNCTURE: CPT

## 2024-07-16 PROCEDURE — 7100000000 HC PACU RECOVERY - FIRST 15 MIN: Performed by: SURGERY

## 2024-07-16 PROCEDURE — 2500000003 HC RX 250 WO HCPCS: Performed by: SURGERY

## 2024-07-16 PROCEDURE — 6360000002 HC RX W HCPCS: Performed by: NURSE ANESTHETIST, CERTIFIED REGISTERED

## 2024-07-16 PROCEDURE — 7100000011 HC PHASE II RECOVERY - ADDTL 15 MIN: Performed by: SURGERY

## 2024-07-16 PROCEDURE — 6370000000 HC RX 637 (ALT 250 FOR IP): Performed by: STUDENT IN AN ORGANIZED HEALTH CARE EDUCATION/TRAINING PROGRAM

## 2024-07-16 PROCEDURE — 2709999900 HC NON-CHARGEABLE SUPPLY: Performed by: SURGERY

## 2024-07-16 PROCEDURE — 3600000009 HC SURGERY ROBOT BASE: Performed by: SURGERY

## 2024-07-16 PROCEDURE — 6360000004 HC RX CONTRAST MEDICATION: Performed by: SURGERY

## 2024-07-16 PROCEDURE — C1889 IMPLANT/INSERT DEVICE, NOC: HCPCS | Performed by: SURGERY

## 2024-07-16 PROCEDURE — 47563 LAPARO CHOLECYSTECTOMY/GRAPH: CPT | Performed by: SURGERY

## 2024-07-16 DEVICE — CLIP INT M L POLYMER LOK LIG HEM O LOK: Type: IMPLANTABLE DEVICE | Site: BILE DUCT | Status: FUNCTIONAL

## 2024-07-16 RX ORDER — SODIUM CHLORIDE 0.9 % (FLUSH) 0.9 %
5-40 SYRINGE (ML) INJECTION EVERY 12 HOURS SCHEDULED
Status: DISCONTINUED | OUTPATIENT
Start: 2024-07-16 | End: 2024-07-16 | Stop reason: HOSPADM

## 2024-07-16 RX ORDER — GLYCOPYRROLATE 0.2 MG/ML
INJECTION INTRAMUSCULAR; INTRAVENOUS PRN
Status: DISCONTINUED | OUTPATIENT
Start: 2024-07-16 | End: 2024-07-16 | Stop reason: SDUPTHER

## 2024-07-16 RX ORDER — METHOCARBAMOL 100 MG/ML
INJECTION, SOLUTION INTRAMUSCULAR; INTRAVENOUS PRN
Status: DISCONTINUED | OUTPATIENT
Start: 2024-07-16 | End: 2024-07-16 | Stop reason: SDUPTHER

## 2024-07-16 RX ORDER — ONDANSETRON 2 MG/ML
4 INJECTION INTRAMUSCULAR; INTRAVENOUS
Status: DISCONTINUED | OUTPATIENT
Start: 2024-07-16 | End: 2024-07-16 | Stop reason: HOSPADM

## 2024-07-16 RX ORDER — LABETALOL HYDROCHLORIDE 5 MG/ML
INJECTION, SOLUTION INTRAVENOUS PRN
Status: DISCONTINUED | OUTPATIENT
Start: 2024-07-16 | End: 2024-07-16 | Stop reason: SDUPTHER

## 2024-07-16 RX ORDER — HYDROMORPHONE HYDROCHLORIDE 2 MG/ML
0.5 INJECTION, SOLUTION INTRAMUSCULAR; INTRAVENOUS; SUBCUTANEOUS EVERY 5 MIN PRN
Status: DISCONTINUED | OUTPATIENT
Start: 2024-07-16 | End: 2024-07-16 | Stop reason: HOSPADM

## 2024-07-16 RX ORDER — FENTANYL CITRATE 50 UG/ML
INJECTION, SOLUTION INTRAMUSCULAR; INTRAVENOUS PRN
Status: DISCONTINUED | OUTPATIENT
Start: 2024-07-16 | End: 2024-07-16 | Stop reason: SDUPTHER

## 2024-07-16 RX ORDER — SODIUM CHLORIDE 0.9 % (FLUSH) 0.9 %
5-40 SYRINGE (ML) INJECTION PRN
Status: DISCONTINUED | OUTPATIENT
Start: 2024-07-16 | End: 2024-07-16 | Stop reason: HOSPADM

## 2024-07-16 RX ORDER — MAGNESIUM HYDROXIDE 1200 MG/15ML
LIQUID ORAL CONTINUOUS PRN
Status: COMPLETED | OUTPATIENT
Start: 2024-07-16 | End: 2024-07-16

## 2024-07-16 RX ORDER — SODIUM CHLORIDE, SODIUM LACTATE, POTASSIUM CHLORIDE, CALCIUM CHLORIDE 600; 310; 30; 20 MG/100ML; MG/100ML; MG/100ML; MG/100ML
INJECTION, SOLUTION INTRAVENOUS CONTINUOUS
Status: DISCONTINUED | OUTPATIENT
Start: 2024-07-16 | End: 2024-07-16 | Stop reason: HOSPADM

## 2024-07-16 RX ORDER — FAMOTIDINE 10 MG/ML
INJECTION, SOLUTION INTRAVENOUS
Status: COMPLETED
Start: 2024-07-16 | End: 2024-07-16

## 2024-07-16 RX ORDER — ONDANSETRON 2 MG/ML
INJECTION INTRAMUSCULAR; INTRAVENOUS PRN
Status: DISCONTINUED | OUTPATIENT
Start: 2024-07-16 | End: 2024-07-16 | Stop reason: SDUPTHER

## 2024-07-16 RX ORDER — LIDOCAINE HYDROCHLORIDE 20 MG/ML
INJECTION, SOLUTION EPIDURAL; INFILTRATION; INTRACAUDAL; PERINEURAL PRN
Status: DISCONTINUED | OUTPATIENT
Start: 2024-07-16 | End: 2024-07-16 | Stop reason: SDUPTHER

## 2024-07-16 RX ORDER — FAMOTIDINE 10 MG/ML
20 INJECTION, SOLUTION INTRAVENOUS ONCE
Status: COMPLETED | OUTPATIENT
Start: 2024-07-16 | End: 2024-07-16

## 2024-07-16 RX ORDER — DEXAMETHASONE SODIUM PHOSPHATE 4 MG/ML
INJECTION, SOLUTION INTRA-ARTICULAR; INTRALESIONAL; INTRAMUSCULAR; INTRAVENOUS; SOFT TISSUE PRN
Status: DISCONTINUED | OUTPATIENT
Start: 2024-07-16 | End: 2024-07-16 | Stop reason: SDUPTHER

## 2024-07-16 RX ORDER — FENTANYL CITRATE 50 UG/ML
50 INJECTION, SOLUTION INTRAMUSCULAR; INTRAVENOUS EVERY 5 MIN PRN
Status: DISCONTINUED | OUTPATIENT
Start: 2024-07-16 | End: 2024-07-16 | Stop reason: HOSPADM

## 2024-07-16 RX ORDER — APREPITANT 40 MG/1
40 CAPSULE ORAL ONCE
Status: COMPLETED | OUTPATIENT
Start: 2024-07-16 | End: 2024-07-16

## 2024-07-16 RX ORDER — SODIUM CHLORIDE 9 MG/ML
INJECTION, SOLUTION INTRAVENOUS PRN
Status: DISCONTINUED | OUTPATIENT
Start: 2024-07-16 | End: 2024-07-16 | Stop reason: HOSPADM

## 2024-07-16 RX ORDER — ACETAMINOPHEN 325 MG/1
650 TABLET ORAL ONCE
Status: COMPLETED | OUTPATIENT
Start: 2024-07-16 | End: 2024-07-16

## 2024-07-16 RX ORDER — INDOCYANINE GREEN AND WATER 25 MG
5 KIT INJECTION ONCE
Status: COMPLETED | OUTPATIENT
Start: 2024-07-16 | End: 2024-07-16

## 2024-07-16 RX ORDER — NALOXONE HYDROCHLORIDE 0.4 MG/ML
INJECTION, SOLUTION INTRAMUSCULAR; INTRAVENOUS; SUBCUTANEOUS PRN
Status: DISCONTINUED | OUTPATIENT
Start: 2024-07-16 | End: 2024-07-16 | Stop reason: HOSPADM

## 2024-07-16 RX ORDER — OXYCODONE HYDROCHLORIDE 5 MG/1
5 TABLET ORAL EVERY 4 HOURS PRN
Qty: 20 TABLET | Refills: 0 | Status: SHIPPED | OUTPATIENT
Start: 2024-07-16 | End: 2024-07-23

## 2024-07-16 RX ORDER — SUCCINYLCHOLINE CHLORIDE 20 MG/ML
INJECTION INTRAMUSCULAR; INTRAVENOUS PRN
Status: DISCONTINUED | OUTPATIENT
Start: 2024-07-16 | End: 2024-07-16 | Stop reason: SDUPTHER

## 2024-07-16 RX ORDER — LOSARTAN POTASSIUM 100 MG/1
100 TABLET ORAL DAILY
COMMUNITY

## 2024-07-16 RX ORDER — SODIUM CHLORIDE 9 MG/ML
INJECTION, SOLUTION INTRAVENOUS PRN
Status: DISCONTINUED | OUTPATIENT
Start: 2024-07-16 | End: 2024-07-16 | Stop reason: SDUPTHER

## 2024-07-16 RX ORDER — ROCURONIUM BROMIDE 10 MG/ML
INJECTION, SOLUTION INTRAVENOUS PRN
Status: DISCONTINUED | OUTPATIENT
Start: 2024-07-16 | End: 2024-07-16 | Stop reason: SDUPTHER

## 2024-07-16 RX ORDER — PROPOFOL 10 MG/ML
INJECTION, EMULSION INTRAVENOUS PRN
Status: DISCONTINUED | OUTPATIENT
Start: 2024-07-16 | End: 2024-07-16 | Stop reason: SDUPTHER

## 2024-07-16 RX ORDER — BUPIVACAINE HYDROCHLORIDE 5 MG/ML
INJECTION, SOLUTION EPIDURAL; INTRACAUDAL
Status: COMPLETED | OUTPATIENT
Start: 2024-07-16 | End: 2024-07-16

## 2024-07-16 RX ORDER — OXYCODONE HYDROCHLORIDE 5 MG/1
5 TABLET ORAL
Status: DISCONTINUED | OUTPATIENT
Start: 2024-07-16 | End: 2024-07-16 | Stop reason: HOSPADM

## 2024-07-16 RX ADMIN — APREPITANT 40 MG: 40 CAPSULE ORAL at 07:07

## 2024-07-16 RX ADMIN — HYDROMORPHONE HYDROCHLORIDE 0.5 MG: 2 INJECTION, SOLUTION INTRAMUSCULAR; INTRAVENOUS; SUBCUTANEOUS at 09:11

## 2024-07-16 RX ADMIN — INDOCYANINE GREEN AND WATER 5 MG: KIT at 06:44

## 2024-07-16 RX ADMIN — SODIUM CHLORIDE: 9 INJECTION, SOLUTION INTRAVENOUS at 06:43

## 2024-07-16 RX ADMIN — FENTANYL CITRATE 100 MCG: 50 INJECTION, SOLUTION INTRAMUSCULAR; INTRAVENOUS at 07:38

## 2024-07-16 RX ADMIN — METHOCARBAMOL 500 MG: 100 INJECTION INTRAMUSCULAR; INTRAVENOUS at 08:00

## 2024-07-16 RX ADMIN — LABETALOL HYDROCHLORIDE 5 MG: 5 INJECTION, SOLUTION INTRAVENOUS at 08:14

## 2024-07-16 RX ADMIN — SUCCINYLCHOLINE CHLORIDE 140 MG: 20 INJECTION, SOLUTION INTRAMUSCULAR; INTRAVENOUS at 07:40

## 2024-07-16 RX ADMIN — FENTANYL CITRATE 100 MCG: 50 INJECTION, SOLUTION INTRAMUSCULAR; INTRAVENOUS at 08:09

## 2024-07-16 RX ADMIN — SUGAMMADEX 200 MG: 100 INJECTION, SOLUTION INTRAVENOUS at 08:33

## 2024-07-16 RX ADMIN — HYDROMORPHONE HYDROCHLORIDE 0.5 MG: 2 INJECTION, SOLUTION INTRAMUSCULAR; INTRAVENOUS; SUBCUTANEOUS at 09:33

## 2024-07-16 RX ADMIN — DEXAMETHASONE SODIUM PHOSPHATE 4 MG: 4 INJECTION, SOLUTION INTRAMUSCULAR; INTRAVENOUS at 07:49

## 2024-07-16 RX ADMIN — CEFAZOLIN 2000 MG: 2 INJECTION, POWDER, FOR SOLUTION INTRAMUSCULAR; INTRAVENOUS at 07:48

## 2024-07-16 RX ADMIN — FAMOTIDINE 20 MG: 10 INJECTION, SOLUTION INTRAVENOUS at 07:11

## 2024-07-16 RX ADMIN — ROCURONIUM BROMIDE 40 MG: 10 INJECTION, SOLUTION INTRAVENOUS at 07:45

## 2024-07-16 RX ADMIN — LIDOCAINE HYDROCHLORIDE 50 MG: 20 INJECTION, SOLUTION EPIDURAL; INFILTRATION; INTRACAUDAL; PERINEURAL at 07:39

## 2024-07-16 RX ADMIN — GLYCOPYRROLATE 0.2 MG: 0.2 INJECTION INTRAMUSCULAR; INTRAVENOUS at 07:49

## 2024-07-16 RX ADMIN — ONDANSETRON 4 MG: 2 INJECTION INTRAMUSCULAR; INTRAVENOUS at 07:49

## 2024-07-16 RX ADMIN — PROPOFOL 150 MG: 10 INJECTION, EMULSION INTRAVENOUS at 07:39

## 2024-07-16 RX ADMIN — METHOCARBAMOL 500 MG: 100 INJECTION INTRAMUSCULAR; INTRAVENOUS at 08:06

## 2024-07-16 RX ADMIN — ROCURONIUM BROMIDE 10 MG: 10 INJECTION, SOLUTION INTRAVENOUS at 07:40

## 2024-07-16 RX ADMIN — ACETAMINOPHEN 650 MG: 325 TABLET ORAL at 07:07

## 2024-07-16 ASSESSMENT — ENCOUNTER SYMPTOMS: SHORTNESS OF BREATH: 0

## 2024-07-16 ASSESSMENT — PAIN DESCRIPTION - DESCRIPTORS
DESCRIPTORS: BURNING
DESCRIPTORS: BURNING;SORE
DESCRIPTORS: ACHING
DESCRIPTORS: BURNING

## 2024-07-16 ASSESSMENT — PAIN DESCRIPTION - LOCATION
LOCATION: ABDOMEN

## 2024-07-16 ASSESSMENT — PAIN SCALES - GENERAL
PAINLEVEL_OUTOF10: 7
PAINLEVEL_OUTOF10: 7
PAINLEVEL_OUTOF10: 3

## 2024-07-16 ASSESSMENT — PAIN - FUNCTIONAL ASSESSMENT
PAIN_FUNCTIONAL_ASSESSMENT: 0-10

## 2024-07-16 ASSESSMENT — PAIN DESCRIPTION - PAIN TYPE: TYPE: SURGICAL PAIN

## 2024-07-16 ASSESSMENT — LIFESTYLE VARIABLES: SMOKING_STATUS: 1

## 2024-07-16 NOTE — OP NOTE
[facility]        Operative Note  Pt Name: Jose Alejandro Mesa  CSN: [id number]  YOB: 1960    Date of Procedure: 7/16/2024    Pre-operative Diagnosis: Symptomatic cholelithiasis    Post-operative Diagnosis: same     Procedure: Robot assisted laparoscopic cholecystectomy with cholangiogram    Surgeon(s):  Zhou Meadows MD     Surgical Assistant: Isaac Severino    Anesthesia: General anesthesia    Location: [facility]     Indication:   Mr. Mesa is a 64 y.o. male who presents to [facility] Office with symptomatic cholelithiasis. Specific risks discussed include bleeding, infection, injury to surrounding structures, possible requirement of additional procedures, and conversion to open, as well as the perioperative risks associated with general anesthesia. Benefits include relief from current abdominal symptoms. Alternatives include observation with medical management. Details of the procedure were discussed and all questions answered. The patient understands, agrees, and wishes to proceed.    Procedure Details:   The patient was brought to the operating suite and laid in the supine position. General anesthesia was induced and well tolerated. A proper time-out was performed verifying operative site, procedure and patient was performed. A supraumbilical incision was made with a scalpel. The umbilicus was elevated with a penetrating towel clip and a Veress needle placed into the peritoneal cavity. Using a saline drip test it was confirmed that we were in the peritoneal cavity which was insufflated with carbon dioxide to a pressure of 15mmHg which was tolerated well. Additional trocars were then placed in the following locations: 2x8mm robotic trocars on the right and 2x8mm robotic trocars on the left in a slightly oblique line at the level of the umbilicus under direct vision. The patient was then positioned and robot docked. A [employer] sponge was

## 2024-07-16 NOTE — PROGRESS NOTES
[address] [place] [address] 
[phone] Patient: Avi Meneses MRN: [id number] :[id number] About your hospitalization You were admitted on:  2018 You last received care in the:  [facility] 6S NEURO-SCI TELE You were discharged on:  2018 Why you were hospitalized Your primary diagnosis was:  Not on File Your diagnoses also included:  Sepsis (Hcc) Follow-up Information Follow up With Details Comments Contact Info Susan Trimble NP Go on 2018 hospital PCP f/u appointment on  @ 11:45 a.m. [address] [place] [address] 
[phone] Morris Antoine MD  at your appointment in AM at [address] [facility] [address] 
[phone] 
  
 your HD  T-Th-Sat Your Scheduled Appointments 2018 To Be Determined PT ROUTINE with Ramon Parrish PTA  
[facility] 1740 [facility] 1400 ([address] [facility]) 1740 [facility] 1400 ([address] [place] [address]) Tuesday May 01, 2018  9:20 AM EDT  
POST OP 10 MIN with KHANH Griggs 137 016 ([address] [place] [address]) 217 [employer] [facility] [id number] [phone] Wednesday May 02, 2018 To Be Determined ROUTINE with Mc Pedro LPN  
[facility] 1740 [facility] 1400 ([address] [facility]) [address] [place] [address] 1400 ([facility]) Wednesday May 02, 2018 11:45 AM EDT ROUTINE CARE with Susan Trimble  W [facility] ([facility]) [address] [place] [address]  
[phone] Wednesday May 02, 2018 To Be Determined OT ROUTINE VISIT with Denver Islas, [facility] ([address] [facility]) Patient to PACU from OR, drowsy. VSS- on 6L simple mask satting high 90s. NSR on monitor. X5 surgical incisions to abdomen CDI, ice applied. Interpretor called back to bedside. Will continue to monitor    [address] ([address]) Friday May 04, 2018 To Be Determined OT ROUTINE VISIT with Joce Erp, [address] ([address]) [address] ([address]) Saturday May 05, 2018 To Be Determined PT ROUTINE with Enrique Rizo PTA  
BON [address] ([address]) [address] ([address]) Monday May 07, 2018 To Be Determined OT ROUTINE VISIT with Joce Erp, [address] ([address]) [address] ([address]) Monday May 07, 2018 To Be Determined PT ROUTINE with Enrique Rizo PTA  
BON [address] ([address]) [address] ([address]) Monday May 07, 2018 To Be Determined ROUTINE with Clarita Armenta LPN  
BON [address] ([address]) [address] ([address]) Wednesday May 09, 2018 To Be Determined OT ROUTINE VISIT with Joce Erp, [address] ([address]) [address] ([address]) Wednesday May 09, 2018 To Be Determined PT ROUTINE with Enrique Rizo PTA  
BON [address] ([address]) [address] ([address]) Wednesday May 09, 2018 To Be Determined SN REASSESSMENT with Ophelia Mathews, DANITZA  
BON [address] ([address]) [address] ([address]) Monday May 14, 2018 To Be Determined OT ROUTINE VISIT with Duane Payne, [address] ([address]) [address] ([address]) Monday May 14, 2018 To Be Determined PT ROUTINE with Harika Craig, PTA  
BON [address] ([address]) [address] ([address]) Monday May 14, 2018  3:30 PM EDT Any with Tung Britt, NP [address] [facility] ([address]) [address] [facility] 99 [id number] [phone] Wednesday May 16, 2018 To Be Determined OT DISCHARGE with Duane Payne, [address] ([address]) [address] ([address]) Wednesday May 16, 2018 To Be Determined PT ROUTINE with Harika Craig, PTA  
BON [address] ([address]) [address] ([address]) Monday May 21, 2018 To Be Determined PT ROUTINE with Harika Craig, PTA  
B[address] ([address]) [address] ([address]) Wednesday May 23, 2018 To Be Determined PT RE-EVALUATION with Lupe Venegas, PT  
BON [address] ([address]) [address] ([address]) Discharge Orders None A check fahad indicates which time of day the medication should be taken. My Medications START taking these medications Instructions Each Dose to Equal  
 Morning Noon Evening Bedtime  
 levoFLOXacin 500 mg tablet Commonly known as:  Radha [place] Your last dose was: Your next dose is: Take 1 Tab by mouth every fourty-eight (48) hours. Take after HD  
 500 mg CHANGE how you take these medications Instructions Each Dose to Equal  
 Morning Noon Evening Bedtime * amitriptyline 25 mg tablet Commonly known as:  ELAVIL What changed:  Another medication with the same name was changed. Make sure you understand how and when to take each. Your last dose was: Your next dose is: Take 25 mg by mouth nightly as needed for Sleep. 25 mg  
    
   
   
   
  
 * amitriptyline 25 mg tablet Commonly known as:  ELAVIL What changed:  additional instructions Your last dose was: Your next dose is: Take 1 Tab by mouth nightly. PRN  
 25 mg  
    
   
   
   
  
 amLODIPine 5 mg tablet Commonly known as:  Seth Degroot What changed:  how much to take Your last dose was: Your next dose is: Take 1 Tab by mouth daily. 5 mg HYDROmorphone 4 mg tablet Commonly known as:  DILAUDID What changed:  Another medication with the same name was removed. Continue taking this medication, and follow the directions you see here. Your last dose was: Your next dose is: Take 1 Tab by mouth every four (4) hours as needed. Max Daily Amount: 24 mg.  
 4 mg * Notice: This list has 2 medication(s) that are the same as other medications prescribed for you. Read the directions carefully, and ask your doctor or other care provider to review them with you. CONTINUE taking these medications Instructions Each Dose to Equal  
 Morning Noon Evening Bedtime  
 albuterol 90 mcg/actuation inhaler Commonly known as:  PROVENTIL HFA, VENTOLIN HFA, PROAIR HFA Your last dose was: Your next dose is: Take 1-2 Puffs by inhalation every four (4) hours as needed for Wheezing or Shortness of Breath. 1-2 Puff  
    
   
   
   
  
 allopurinol 100 mg tablet Commonly known as:  Ingrid Cooley Your last dose was: Your next dose is: Take 100 mg by mouth daily (after breakfast). Needs to TAKE on a full stomach; will cause her to be nauseated. 100 mg  
    
   
   
   
  
 apixaban 5 mg tablet Commonly known as:  Bianka Mao Your last dose was: Your next dose is: Take 1 Tab by mouth every twelve (12) hours. 5 mg  
    
   
   
   
  
 azaTHIOprine 50 mg tablet Commonly known as:  [employer] Your last dose was: Your next dose is: Take 50 mg by mouth daily (after breakfast). 50 mg COREG 6.25 mg tablet Generic drug:  carvedilol Your last dose was: Your next dose is: Take 12.5 mg by mouth two (2) times daily (with meals). 12.5 mg  
    
   
   
   
  
 cyanocobalamin 2,500 mcg sublingual tablet Commonly known as:  VITAMIN B-12 Your last dose was: Your next dose is: Take 1 Tab by mouth daily. 2500 mcg  
    
   
   
   
  
 fluticasone-vilanterol 200-25 mcg/dose inhaler Commonly known as:  BREO ELLIPTA Your last dose was: Your next dose is: Take 1 Puff by inhalation daily. Rinse mouth out after use 1 Puff  
    
   
   
   
  
 gemfibrozil 600 mg tablet Commonly known as:  LOPID Your last dose was: Your next dose is: Take 300 mg by mouth daily. 300 mg  
    
   
   
   
  
 pantoprazole 40 mg tablet Commonly known as:  PROTONIX Your last dose was: Your next dose is: Take 1 Tab by mouth Daily (before breakfast). 40 mg  
    
   
   
   
  
 PLAQUENIL 200 mg tablet Generic drug:  hydroxychloroquine Your last dose was: Your next dose is: Take 200 mg by mouth two (2) times a day. 200 mg  
    
   
   
   
  
 polyethylene glycol 17 gram packet Commonly known as:  Ross Poe Your last dose was: Your next dose is: Take 1 Packet by mouth daily. 17 g  
    
   
   
   
  
 predniSONE 5 mg tablet Commonly known as:  Merctino Fremont Your last dose was: Your next dose is: Take 5 mg by mouth daily. 5 mg Senna 8.6 mg tablet Generic drug:  senna Your last dose was: Your next dose is: Take 1 Tab by mouth daily as needed. 1 Tab Where to Get Your Medications Information on where to get these meds will be given to you by the nurse or doctor. ! Ask your nurse or doctor about these medications  
  apixaban 5 mg tablet HYDROmorphone 4 mg tablet  
 levoFLOXacin 500 mg tablet Opioid Education Prescription Opioids: What You Need to Know: 
 
 
You have been admitted to the hospital with the following diagnoses: - Volume overload - Atelectasis FOLLOW-UP CARE RECOMMENDATIONS: 
 
APPOINTMENTS: 
Follow-up Information Follow up With Details Comments Contact Info Zackary Bettencourt NP In 2 weeks Discharge follow up  [address] 
[phone] Escobar Webb MD  at your appointment in AM at [address] [facility] [address] 
[phone] 
  
 your HD  T-Th-Sat FOLLOW-UP TESTS recommended: - Levaquin 500mg every 48 hrs,take after Dialysis PENDING TEST RESULTS: 
At the time of your discharge the following test results are still pending: NONE Please make sure you review these results with your outpatient follow-up provider(s). SYMPTOMS to watch for: chest pain, shortness of breath, fever, chills, nausea, vomiting, diarrhea, change in mentation, falling, weakness, bleeding. DIET/what to eat:  Renal Diet ACTIVITY:  Activity as tolerated WOUND CARE: NONE 
 
EQUIPMENT needed:  NONE What to do if new or unexpected symptoms occur? If you experience any of the above symptoms (or should other concerns or questions arise after discharge) please call your primary care physician. Return to the emergency room if you cannot get hold of your doctor. · It is very important that you keep your follow-up appointment(s). · Please bring discharge papers, medication list (and/or medication bottles) to your follow-up appointments for review by your outpatient provider(s). · Please check the list of medications and be sure it includes every medication (even non-prescription medications) that your provider wants you to take. · It is important that you take the medication exactly as they are prescribed. · Keep your medication in the bottles provided by the pharmacist and keep a list of the medication names, dosages, and times to be taken in your wallet. · Do not take other medications without consulting your doctor. · If you have any questions about your medications or other instructions, please talk to your nurse or care provider before you leave the hospital. 
 
I understand that if any problems occur once I am at home I am to contact my physician. These instructions were explained to me and I had the opportunity to ask questions. ACO Transitions of Care Introducing [employer] [address] Ekaterina Krishnamurthy offers a voluntary care coordination program to provide high quality service and care to [facility] fee-for-service beneficiaries. Alexa Briggs was designed to help you enhance your health and well-being through the following services: ? Transitions of Care  support for individuals who are transitioning from one care setting to another (example: Hospital to home). ? Chronic and Complex Care Coordination  support for individuals and caregivers of those with serious or chronic illnesses or with more than one chronic (ongoing) condition and those who take a number of different medications. If you meet specific medical criteria, a [address] [place] [address] Rd may call you directly to coordinate your care with your primary care physician and your other care providers. For questions about the [facility] programs, please, contact your physicians office. For general questions or additional information about Accountable Care Organizations: 
Please visit www.medicare.gov/acos. html or call 1-800-MEDICARE (5-201.146.7037) TTY users should call 5-899.667.7243. [employer] Announcement We are excited to announce that we are making your provider's discharge notes available to you in [employer]. You will see these notes when they are completed and signed by the physician that discharged you from your recent hospital stay. If you have any questions or concerns about any information you see in [employer], please call the Health Information Department where you were seen or reach out to your Primary Care Provider for more information about your plan of care. Introducing hospitals & HEALTH SERVICES! Filiberto Hector introduces [employer] patient portal. Now you can access parts of your medical record, email your doctor's office, and request medication refills online. 1. In your internet browser, go to [email or web address] 2. Click on the First Time User? Click Here link in the Sign In box. You will see the New Member Sign Up page. 3. Enter your [employer] Access Code exactly as it appears below. You will not need to use this code after youve completed the sign-up process. If you do not sign up before the expiration date, you must request a new code. · [employer] Access Code: [email or web address] Expires: 6/3/2018 11:58 AM 
 
4. Enter the last four digits of your Social Security Number (xxxx) and Date of Birth (mm/dd/yyyy) as indicated and click Submit. You will be taken to the next sign-up page. 5. Create a [employer] ID. This will be your [employer] login ID and cannot be changed, so think of one that is secure and easy to remember. 6. Create a [employer] password. You can change your password at any time. 7. Enter your Password Reset Question and Answer. This can be used at a later time if you forget your password. 8. Enter your e-mail address. You will receive e-mail notification when new information is available in [address] 9. Click Sign Up. You can now view and download portions of your medical record. 10. Click the Download Summary menu link to download a portable copy of your medical information. If you have questions, please visit the Frequently Asked Questions section of the [employer] website. Remember, [employer] is NOT to be used for urgent needs. For medical emergencies, dial 911. Now available from your iPhone and Android! Introducing Nito Major As a [facility] patient, I wanted to make you aware of our electronic visit tool called [employer]. [facility] 24/7 allows you to connect within minutes with a medical provider 24 hours a day, seven days a week via a mobile device or tablet or logging into a secure website from your computer. You can access Nito Major from anywhere in the [place].  
 
A virtual visit might be right for you when you have a simple condition and feel like you just dont want to get out of bed, or cant get away from work for an appointment, when your regular [facility] provider is not available (evenings, weekends or holidays), or when youre out of town and need minor care. Electronic visits cost only $49 and if the [facility] 24/7 provider determines a prescription is needed to treat your condition, one can be electronically transmitted to a nearby pharmacy*. Please take a moment to enroll today if you have not already done so. The enrollment process is free and takes just a few minutes. To enroll, please download the [employer] 24/7 judith to your tablet or phone, or visit [email or web address] to enroll on your computer. And, as an [address] [place] [address] patient with a [employer] account, the results of your visits will be scanned into your electronic medical record and your primary care provider will be able to view the scanned results. We urge you to continue to see your regular [facility] provider for your ongoing medical care. And while your primary care provider may not be the one available when you seek a [employer] virtual visit, the peace of mind you get from getting a real diagnosis real time can be priceless. For more information on [employer], view our Frequently Asked Questions (FAQs) at [email or web address]. Sincerely, 
 
Renita Barahona MD 
Chief Medical Officer Micheal Ekaterina Raghu *:  certain medications cannot be prescribed via [employer] Unresulted Labs-Please follow up with your PCP about these lab tests Order Current Status CULTURE, BLOOD, PAIRED Preliminary result CULTURE, BODY FLUID W GRAM STAIN Preliminary result Providers Seen During Your Hospitalization Provider Specialty Primary office phone Bhaskar Desir MD Emergency Medicine [phone] Maxine Mcconnell MD Internal Medicine [phone] Presley Giles MD Internal Medicine [phone] Your Primary Care Physician (PCP) Primary Care Physician Office Phone Office Fax Milady Manzanares [phone] You are allergic to the following Allergen Reactions Compazine (Prochlorperazine Edisylate) Nausea and Vomiting Palpitations Recent Documentation Height Weight BMI OB Status Smoking Status 1.651 m 68.6 kg 25.17 kg/m2 Medically Induced Never Smoker Emergency Contacts Name Discharge Info Relation Home Work Mobile Louis Sparks CAREGIVER [3] Mother [14] [phone] Stew Lockhart CAREGIVER [3] Father [15]   [phone] Patient Belongings The following personal items are in your possession at time of discharge: 
  Dental Appliances: None  Visual Aid: None      Home Medications: None   Jewelry: None  Clothing: At bedside    Other Valuables: [address] [facility] [place] Please provide this summary of care documentation to your next provider. Signatures-by signing, you are acknowledging that this After Visit Summary has been reviewed with you and you have received a copy. Patient Signature:  ____________________________________________________________ Date:  ____________________________________________________________  
  
Khloe Sanchez Provider Signature:  ____________________________________________________________ Date:  ____________________________________________________________

## 2024-07-16 NOTE — ANESTHESIA PRE PROCEDURE
12:19 PM    CREATININE 1.4 07/05/2024 12:19 PM    GFRAA >60 09/21/2022 05:21 AM    AGRATIO 1.8 06/17/2024 11:01 AM    LABGLOM 56 07/05/2024 12:19 PM    LABGLOM >60 05/09/2023 05:18 PM    GLUCOSE 163 07/05/2024 12:19 PM    CALCIUM 9.2 07/05/2024 12:19 PM    BILITOT 0.5 07/05/2024 12:19 PM    ALKPHOS 165 07/05/2024 12:19 PM    AST 25 07/05/2024 12:19 PM    ALT 30 07/05/2024 12:19 PM       POC Tests: No results for input(s): \"POCGLU\", \"POCNA\", \"POCK\", \"POCCL\", \"POCBUN\", \"POCHEMO\", \"POCHCT\" in the last 72 hours.    Coags:   Lab Results   Component Value Date/Time    PROTIME 12.1 12/17/2017 12:45 PM    INR 1.07 12/17/2017 12:45 PM    APTT 39.7 12/17/2017 12:45 PM       HCG (If Applicable): No results found for: \"PREGTESTUR\", \"PREGSERUM\", \"HCG\", \"HCGQUANT\"     ABGs: No results found for: \"PHART\", \"PO2ART\", \"DQQ7OXT\", \"HQF9EKP\", \"BEART\", \"F5XGRBCY\"     Type & Screen (If Applicable):  No results found for: \"LABABO\"    Drug/Infectious Status (If Applicable):  Lab Results   Component Value Date/Time    HIV Non-Reactive 06/17/2024 11:01 AM       COVID-19 Screening (If Applicable):   Lab Results   Component Value Date/Time    COVID19 Not Detected 09/19/2022 02:27 PM           Anesthesia Evaluation  Patient summary reviewed and Nursing notes reviewed  Airway: Mallampati: II  TM distance: <3 FB   Neck ROM: full  Mouth opening: > = 3 FB   Dental: normal exam         Pulmonary: breath sounds clear to auscultation  (+)           current smoker    (-) COPD, asthma, shortness of breath and sleep apnea                           Cardiovascular:  Exercise tolerance: good (>4 METS)  (+) hypertension:, hyperlipidemia    (-) past MI, CAD,  angina and  CHF      Rhythm: regular  Rate: normal                 ROS comment: 2022 echo   Conclusions      Summary   Left ventricular cavity size is normal with normal left ventricular wall   thickness.   There is asymmetric hypertrophy of the basal septum.   Ejection fraction is estimated to be

## 2024-07-16 NOTE — H&P
[facility]    I have reviewed the history and physical and examined the patient and find no relevant changes.    I have reviewed with the patient and/or family the risks, benefits, and alternatives to the procedure.    Zhou Meadows MD, FACS  7/16/2024  7:01 AM

## 2024-07-16 NOTE — PROGRESS NOTES
Patient arrives to phase 2 from PACU s/p cholecystectomy. Patient is alert, oriented, smiling and currently without complaint. Tolerating jello, offered another as well as water. Incisions x 5 are cdi and with surgical glue, ice pack to site. Pain currently 3/10, pt reports satisfaction. VSS. Rails up, call light in reach, family at bedside accompanied by .

## 2024-07-16 NOTE — DISCHARGE INSTRUCTIONS
[facility]        Discharge Instructions  Activity  No heavy lifting over 20 lbs for 6 weeks from date of surgery.  It is OK to be up walking around; walking up and down stairs is also OK.   Do what is comfortable: stop and rest if you feel tired  Driving  Do not drive for at least 48 hours after your surgery. Your reaction time and coordination will be slowed until your body totally reverses the effect of the anesthetic. Do not drive while taking narcotic pain medication.  It is OK to be up walking around. Walking up and down stairs is also OK.   Do what is comfortable. Stop and rest if you feel tired  Diet  Drink plenty of fluids. Stay on a bland diet for 2-3 days after your surgery. Avoid milk products and fatty foods for the first few days which may cause bloating, nausea, diarrhea.  Pain  Tylenol 1000mg by mouth every 4 hours for 1-2 weeks. Ibuprofen 600mg by mouth with food for 1-2 weeks (may need to adjust the dose as directed on the bottle if enteric coated ibuprofen chosen). May use narcotic pain medication as prescribed for breakthrough pain.   Narcotic pain medication will not be refilled on weekends or after hours. Please contact the office Monday-Friday 8-4p if a refill is requested.  During laparoscopic surgery, gas is pumped into the abdominal cavity and may cause abdominal, shoulder, rib pain for the first few days. May use ice on incision for short periods of time as needed.  Nausea  Avoid taking narcotic pain medication on an empty stomach which may result in nausea. If pain medication is causing nausea try decreasing the dose.  Nausea can be common for 24 hours after surgery. If nausea uncontrolled or worsening, contact the office or go to the ED  Constipation  Pain medication can be constipating. May be helpful to take a stool softener with the goal of at least one soft bowel movement daily with minimal

## 2024-07-16 NOTE — ANESTHESIA POSTPROCEDURE EVALUATION
Department of Anesthesiology  Postprocedure Note    Patient: Jose Alejandro Mesa  MRN: [id number]  YOB: 1960  Date of evaluation: 7/16/2024    Procedure Summary       Date: 07/16/24 Room / Location: [address]    Anesthesia Start: 0733 Anesthesia Stop: 0858    Procedure: ROBOTIC LAPAROSCOPIC CHOLECYSTECTOMY WITH CHOLANGIOGRAM (Abdomen) Diagnosis:       Symptomatic cholelithiasis      (Symptomatic cholelithiasis [K80.20])    Surgeons: Zhou Meadows MD Responsible Provider: Kylie Aceves MD    Anesthesia Type: general ASA Status: 3            Anesthesia Type: No value filed.    Navin Phase I: Navin Score: 10    Navin Phase II: Navin Score: 10    Anesthesia Post Evaluation    Patient location during evaluation: bedside  Patient participation: complete - patient participated  Level of consciousness: awake and alert  Pain score: 2  Airway patency: patent  Nausea & Vomiting: no vomiting  Cardiovascular status: hemodynamically stable  Respiratory status: nonlabored ventilation  Hydration status: stable  Multimodal analgesia pain management approach  Pain management: adequate    No notable events documented.

## 2024-07-16 NOTE — PROGRESS NOTES
Patient awake resting comfortably in bed, VSS. X5 surgical incisions to abdomen CDI. Patient states pain is tolerable and denies nausea- tolerating POs. Phase one criteria met, will transfer to [facility] for discharge

## 2024-07-30 ENCOUNTER — OFFICE VISIT (OUTPATIENT)
Dept: SURGERY | Age: 64
End: 2024-07-30

## 2024-07-30 VITALS — BODY MASS INDEX: 30.18 KG/M2 | DIASTOLIC BLOOD PRESSURE: 80 MMHG | SYSTOLIC BLOOD PRESSURE: 122 MMHG | WEIGHT: 165 LBS

## 2024-07-30 DIAGNOSIS — Z09 SURGERY FOLLOW-UP: Primary | ICD-10-CM

## 2024-07-30 PROCEDURE — 99024 POSTOP FOLLOW-UP VISIT: CPT | Performed by: SURGERY

## 2024-07-30 RX ORDER — IBUPROFEN 600 MG/1
600 TABLET ORAL 3 TIMES DAILY PRN
Qty: 90 TABLET | Refills: 1 | Status: SHIPPED | OUTPATIENT
Start: 2024-07-30

## 2024-07-30 NOTE — PROGRESS NOTES
[facility]  SUBJECTIVE:    Jose Alejandro Mesa   1960   64 y.o. male presents for routine postoperative followup after robot-assisted laparoscopic cholecystectomy with cholangiogram for symptomatic cholelithiasis on 7/16/24. Pain controlled. Some right upper quadrant pain and has been taking tylenol but not ibuprofen. Tolerating diet. Bowels normalized. Ambulating well    Past Medical History:   Diagnosis Date    Diabetes mellitus (HCC)     Hypertension      Past Surgical History:   Procedure Laterality Date    CHOLECYSTECTOMY, LAPAROSCOPIC N/A 7/16/2024    ROBOTIC LAPAROSCOPIC CHOLECYSTECTOMY WITH CHOLANGIOGRAM performed by Zhou Meadows MD at [facility] OR    FINGER TRIGGER RELEASE Right 3/3/2022    RIGHT LONG FINGER TRIGGER FINGER RELEASE performed by Monae Wahl MD at [facility] OR     Social History     Socioeconomic History    Marital status:      Spouse name: Not on file    Number of children: Not on file    Years of education: Not on file    Highest education level: Not on file   Occupational History    Not on file   Tobacco Use    Smoking status: Every Day    Smokeless tobacco: Never   Vaping Use    Vaping Use: Never used   Substance and Sexual Activity    Alcohol use: No    Drug use: No    Sexual activity: Not on file   Other Topics Concern    Not on file   Social History Narrative    Not on file     Social Determinants of Health     Financial Resource Strain: Low Risk  (6/17/2024)    Overall Financial Resource Strain (CARDIA)     Difficulty of Paying Living Expenses: Not hard at all   Food Insecurity: No Food Insecurity (6/17/2024)    Hunger Vital Sign     Worried About Running Out of Food in the Last Year: Never true     Ran Out of Food in the Last Year: Never true   Transportation Needs: Unmet Transportation Needs (6/17/2024)    PRAPARE - Transportation     Lack of Transportation (Medical): Not on file     Lack of Transportation (Non-Medical): Yes

## 2024-08-06 ENCOUNTER — OFFICE VISIT (OUTPATIENT)
Dept: PRIMARY CARE CLINIC | Age: 64
End: 2024-08-06
Payer: COMMERCIAL

## 2024-08-06 VITALS
HEIGHT: 62 IN | DIASTOLIC BLOOD PRESSURE: 72 MMHG | TEMPERATURE: 98.3 F | HEART RATE: 85 BPM | SYSTOLIC BLOOD PRESSURE: 124 MMHG | RESPIRATION RATE: 14 BRPM | OXYGEN SATURATION: 98 % | BODY MASS INDEX: 30.25 KG/M2 | WEIGHT: 164.4 LBS

## 2024-08-06 DIAGNOSIS — E11.9 TYPE 2 DIABETES MELLITUS WITHOUT COMPLICATION, WITHOUT LONG-TERM CURRENT USE OF INSULIN (HCC): ICD-10-CM

## 2024-08-06 DIAGNOSIS — I10 PRIMARY HYPERTENSION: ICD-10-CM

## 2024-08-06 DIAGNOSIS — Z90.49 HX OF CHOLECYSTECTOMY: Primary | ICD-10-CM

## 2024-08-06 DIAGNOSIS — E78.49 OTHER HYPERLIPIDEMIA: ICD-10-CM

## 2024-08-06 PROCEDURE — G8427 DOCREV CUR MEDS BY ELIG CLIN: HCPCS | Performed by: INTERNAL MEDICINE

## 2024-08-06 PROCEDURE — 2022F DILAT RTA XM EVC RTNOPTHY: CPT | Performed by: INTERNAL MEDICINE

## 2024-08-06 PROCEDURE — G8417 CALC BMI ABV UP PARAM F/U: HCPCS | Performed by: INTERNAL MEDICINE

## 2024-08-06 PROCEDURE — 3017F COLORECTAL CA SCREEN DOC REV: CPT | Performed by: INTERNAL MEDICINE

## 2024-08-06 PROCEDURE — 3078F DIAST BP <80 MM HG: CPT | Performed by: INTERNAL MEDICINE

## 2024-08-06 PROCEDURE — 99214 OFFICE O/P EST MOD 30 MIN: CPT | Performed by: INTERNAL MEDICINE

## 2024-08-06 PROCEDURE — 3074F SYST BP LT 130 MM HG: CPT | Performed by: INTERNAL MEDICINE

## 2024-08-06 PROCEDURE — 4004F PT TOBACCO SCREEN RCVD TLK: CPT | Performed by: INTERNAL MEDICINE

## 2024-08-06 PROCEDURE — 3044F HG A1C LEVEL LT 7.0%: CPT | Performed by: INTERNAL MEDICINE

## 2024-08-06 ASSESSMENT — ENCOUNTER SYMPTOMS
BLOOD IN STOOL: 0
COLOR CHANGE: 0
WHEEZING: 0
SHORTNESS OF BREATH: 0
CONSTIPATION: 0
NAUSEA: 0
EYE PAIN: 0
TROUBLE SWALLOWING: 0
EYE REDNESS: 0
VOMITING: 0
SINUS PRESSURE: 0
COUGH: 0
ABDOMINAL DISTENTION: 0
CHEST TIGHTNESS: 0
DIARRHEA: 0
ABDOMINAL PAIN: 0
BACK PAIN: 0
SORE THROAT: 0

## 2024-08-06 NOTE — PROGRESS NOTES
[id number]   [facility] #[id number]    
looking into preparing his will an advanced directives.       The ASCVD Risk score (Bernard WISE, et al., 2019) failed to calculate for the following reasons:    The valid total cholesterol range is 130 to 320 mg/dL     Review of Systems   Constitutional:  Negative for activity change, appetite change, chills, fatigue, fever and unexpected weight change.   HENT:  Negative for congestion, ear pain, postnasal drip, sinus pressure, sore throat, tinnitus and trouble swallowing.    Eyes:  Negative for pain and redness.   Respiratory:  Negative for cough, chest tightness, shortness of breath and wheezing.    Cardiovascular:  Negative for chest pain, palpitations and leg swelling.   Gastrointestinal:  Negative for abdominal distention, abdominal pain, blood in stool, constipation, diarrhea, nausea and vomiting.   Endocrine: Negative for cold intolerance, heat intolerance and polydipsia.   Genitourinary:  Negative for decreased urine volume, dysuria, flank pain, frequency, hematuria and urgency.   Musculoskeletal:  Negative for arthralgias, back pain, joint swelling, neck pain and neck stiffness.   Skin:  Negative for color change and rash.   Neurological:  Negative for dizziness, weakness, numbness and headaches.   Hematological:  Negative for adenopathy.   Psychiatric/Behavioral:  Negative for behavioral problems, sleep disturbance and suicidal ideas. The patient is not nervous/anxious.         /72 (Site: Left Upper Arm, Position: Sitting, Cuff Size: Medium Adult)   Pulse 85   Temp 98.3 °F (36.8 °C) (Infrared)   Resp 14   Ht 1.575 m (5' 2\")   Wt 74.6 kg (164 lb 6.4 oz)   SpO2 98%   BMI 30.07 kg/m²    Physical Exam  Constitutional:       General: He is not in acute distress.     Appearance: Normal appearance. He is not ill-appearing.   HENT:      Head: Normocephalic and atraumatic.      Right Ear: Ear canal normal.      Left Ear: Ear canal normal.      Mouth/Throat:      Mouth: Mucous membranes are moist.

## 2024-08-06 NOTE — CONSULTS
Session ID: [id number]  Language: [facility]   ID: #[id number]   Name: Prince

## 2024-08-06 NOTE — ASSESSMENT & PLAN NOTE
Patient s/p cholecystectomy 7/16/2024.  Patient had postop visit 7/30/2024.  Patient says he still has some discomfort to the right upper quadrant but overall he is feeling better.

## 2024-08-13 DIAGNOSIS — M51.36 DDD (DEGENERATIVE DISC DISEASE), LUMBAR: ICD-10-CM

## 2024-08-13 DIAGNOSIS — G89.29 CHRONIC PAIN OF LEFT KNEE: ICD-10-CM

## 2024-08-13 DIAGNOSIS — M25.512 CHRONIC LEFT SHOULDER PAIN: ICD-10-CM

## 2024-08-13 DIAGNOSIS — M25.562 CHRONIC PAIN OF LEFT KNEE: ICD-10-CM

## 2024-08-13 DIAGNOSIS — G89.29 CHRONIC LEFT SHOULDER PAIN: ICD-10-CM

## 2024-08-13 RX ORDER — CELECOXIB 200 MG/1
200 CAPSULE ORAL 2 TIMES DAILY
Qty: 60 CAPSULE | Refills: 2 | Status: SHIPPED | OUTPATIENT
Start: 2024-08-13

## 2024-09-16 ENCOUNTER — TELEPHONE (OUTPATIENT)
Dept: ORTHOPEDIC SURGERY | Age: 64
End: 2024-09-16

## 2024-10-23 DIAGNOSIS — E11.9 TYPE 2 DIABETES MELLITUS WITHOUT COMPLICATION, WITHOUT LONG-TERM CURRENT USE OF INSULIN (HCC): Primary | ICD-10-CM

## 2024-11-05 ENCOUNTER — OFFICE VISIT (OUTPATIENT)
Dept: PRIMARY CARE CLINIC | Age: 64
End: 2024-11-05
Payer: COMMERCIAL

## 2024-11-05 VITALS
HEIGHT: 62 IN | WEIGHT: 169 LBS | HEART RATE: 71 BPM | DIASTOLIC BLOOD PRESSURE: 80 MMHG | SYSTOLIC BLOOD PRESSURE: 138 MMHG | BODY MASS INDEX: 31.1 KG/M2 | OXYGEN SATURATION: 98 % | RESPIRATION RATE: 14 BRPM | TEMPERATURE: 98.3 F

## 2024-11-05 DIAGNOSIS — R10.9 FLANK PAIN: ICD-10-CM

## 2024-11-05 DIAGNOSIS — R31.9 HEMATURIA, UNSPECIFIED TYPE: ICD-10-CM

## 2024-11-05 DIAGNOSIS — R10.84 GENERALIZED ABDOMINAL PAIN: Primary | ICD-10-CM

## 2024-11-05 DIAGNOSIS — Z23 NEED FOR SHINGLES VACCINE: ICD-10-CM

## 2024-11-05 DIAGNOSIS — R10.84 GENERALIZED ABDOMINAL PAIN: ICD-10-CM

## 2024-11-05 LAB
ALBUMIN SERPL-MCNC: 4.4 G/DL (ref 3.4–5)
ANION GAP SERPL CALCULATED.3IONS-SCNC: 11 MMOL/L (ref 3–16)
BASOPHILS # BLD: 0.1 K/UL (ref 0–0.2)
BASOPHILS NFR BLD: 0.9 %
BILIRUBIN, POC: ABNORMAL
BLOOD URINE, POC: ABNORMAL
BUN SERPL-MCNC: 20 MG/DL (ref 7–20)
CALCIUM SERPL-MCNC: 8.9 MG/DL (ref 8.3–10.6)
CHLORIDE SERPL-SCNC: 99 MMOL/L (ref 99–110)
CLARITY, POC: CLEAR
CO2 SERPL-SCNC: 22 MMOL/L (ref 21–32)
COLOR, POC: YELLOW
CREAT SERPL-MCNC: 1.5 MG/DL (ref 0.8–1.3)
DEPRECATED RDW RBC AUTO: 13.7 % (ref 12.4–15.4)
EOSINOPHIL # BLD: 0.7 K/UL (ref 0–0.6)
EOSINOPHIL NFR BLD: 7.2 %
GFR SERPLBLD CREATININE-BSD FMLA CKD-EPI: 51 ML/MIN/{1.73_M2}
GLUCOSE SERPL-MCNC: 128 MG/DL (ref 70–99)
GLUCOSE URINE, POC: ABNORMAL MG/DL
HCT VFR BLD AUTO: 36.1 % (ref 40.5–52.5)
HGB BLD-MCNC: 11.9 G/DL (ref 13.5–17.5)
KETONES, POC: ABNORMAL MG/DL
LEUKOCYTE EST, POC: ABNORMAL
LYMPHOCYTES # BLD: 2 K/UL (ref 1–5.1)
LYMPHOCYTES NFR BLD: 20.6 %
MCH RBC QN AUTO: 28.9 PG (ref 26–34)
MCHC RBC AUTO-ENTMCNC: 32.9 G/DL (ref 31–36)
MCV RBC AUTO: 87.8 FL (ref 80–100)
MONOCYTES # BLD: 0.6 K/UL (ref 0–1.3)
MONOCYTES NFR BLD: 5.6 %
NEUTROPHILS # BLD: 6.4 K/UL (ref 1.7–7.7)
NEUTROPHILS NFR BLD: 65.7 %
NITRITE, POC: ABNORMAL
PH, POC: 5.5
PHOSPHATE SERPL-MCNC: 2.9 MG/DL (ref 2.5–4.9)
PLATELET # BLD AUTO: 288 K/UL (ref 135–450)
PMV BLD AUTO: 8.2 FL (ref 5–10.5)
POTASSIUM SERPL-SCNC: 4 MMOL/L (ref 3.5–5.1)
PROTEIN, POC: ABNORMAL MG/DL
RBC # BLD AUTO: 4.11 M/UL (ref 4.2–5.9)
SODIUM SERPL-SCNC: 132 MMOL/L (ref 136–145)
SPECIFIC GRAVITY, POC: 1
UROBILINOGEN, POC: 0.2 MG/DL
WBC # BLD AUTO: 9.8 K/UL (ref 4–11)

## 2024-11-05 PROCEDURE — 81002 URINALYSIS NONAUTO W/O SCOPE: CPT | Performed by: INTERNAL MEDICINE

## 2024-11-05 PROCEDURE — 90750 HZV VACC RECOMBINANT IM: CPT | Performed by: INTERNAL MEDICINE

## 2024-11-05 PROCEDURE — 90471 IMMUNIZATION ADMIN: CPT | Performed by: INTERNAL MEDICINE

## 2024-11-05 PROCEDURE — G8484 FLU IMMUNIZE NO ADMIN: HCPCS | Performed by: INTERNAL MEDICINE

## 2024-11-05 PROCEDURE — 3079F DIAST BP 80-89 MM HG: CPT | Performed by: INTERNAL MEDICINE

## 2024-11-05 PROCEDURE — 3017F COLORECTAL CA SCREEN DOC REV: CPT | Performed by: INTERNAL MEDICINE

## 2024-11-05 PROCEDURE — G8417 CALC BMI ABV UP PARAM F/U: HCPCS | Performed by: INTERNAL MEDICINE

## 2024-11-05 PROCEDURE — 4004F PT TOBACCO SCREEN RCVD TLK: CPT | Performed by: INTERNAL MEDICINE

## 2024-11-05 PROCEDURE — 99213 OFFICE O/P EST LOW 20 MIN: CPT | Performed by: INTERNAL MEDICINE

## 2024-11-05 PROCEDURE — G8427 DOCREV CUR MEDS BY ELIG CLIN: HCPCS | Performed by: INTERNAL MEDICINE

## 2024-11-05 PROCEDURE — 3075F SYST BP GE 130 - 139MM HG: CPT | Performed by: INTERNAL MEDICINE

## 2024-11-05 RX ORDER — ACETAMINOPHEN 500 MG
1000 TABLET ORAL 3 TIMES DAILY PRN
Qty: 180 TABLET | Refills: 5 | Status: SHIPPED | OUTPATIENT
Start: 2024-11-05

## 2024-11-05 ASSESSMENT — ENCOUNTER SYMPTOMS
TROUBLE SWALLOWING: 0
CONSTIPATION: 0
EYE REDNESS: 0
VOMITING: 0
DIARRHEA: 0
SHORTNESS OF BREATH: 0
ABDOMINAL PAIN: 0
COUGH: 0
EYE PAIN: 0
SINUS PRESSURE: 0
CHEST TIGHTNESS: 0
ABDOMINAL DISTENTION: 0
WHEEZING: 0
SORE THROAT: 0
BACK PAIN: 0
BLOOD IN STOOL: 0
NAUSEA: 0
COLOR CHANGE: 0

## 2024-11-05 NOTE — PROGRESS NOTES
[id number]   [facility] #[id number]    
30.91 kg/m²    Physical Exam  Constitutional:       General: He is not in acute distress.     Appearance: Normal appearance. He is not ill-appearing.   HENT:      Head: Normocephalic and atraumatic.      Right Ear: Ear canal normal.      Left Ear: Ear canal normal.      Mouth/Throat:      Mouth: Mucous membranes are moist.      Pharynx: No oropharyngeal exudate or posterior oropharyngeal erythema.   Eyes:      Extraocular Movements: Extraocular movements intact.      Conjunctiva/sclera: Conjunctivae normal.      Pupils: Pupils are equal, round, and reactive to light.   Neck:      Vascular: No carotid bruit.   Cardiovascular:      Rate and Rhythm: Normal rate and regular rhythm.      Pulses: Normal pulses.      Heart sounds: Normal heart sounds. No murmur heard.     No gallop.   Pulmonary:      Effort: Pulmonary effort is normal.      Breath sounds: Normal breath sounds. No wheezing, rhonchi or rales.   Abdominal:      General: Abdomen is flat. There is no distension.      Tenderness: There is abdominal tenderness (Tenderness to upper abdomen, right flank.  No rebound tenderness.  Right CVA tenderness). There is right CVA tenderness.   Musculoskeletal:         General: No swelling or tenderness. Normal range of motion.      Cervical back: No tenderness.   Lymphadenopathy:      Cervical: No cervical adenopathy.   Skin:     Findings: No erythema, lesion or rash.   Neurological:      General: No focal deficit present.      Mental Status: He is alert and oriented to person, place, and time. Mental status is at baseline.      Cranial Nerves: No cranial nerve deficit.      Motor: No weakness.   Psychiatric:         Mood and Affect: Mood normal.         Behavior: Behavior normal.         Thought Content: Thought content normal.         Judgment: Judgment normal.          ASSESSMENT/PLAN:  1. Generalized abdominal pain  Assessment & Plan:    we will repeat renal panel has history of elevated creatinine we will hold off

## 2024-11-05 NOTE — ASSESSMENT & PLAN NOTE
we will repeat renal panel has history of elevated creatinine we will hold off contrast study.  Will get CT abdomen pelvis without contrast, has hematuria also like to rule out nephrolithiasis.  Patient informed that if the pain is getting worse pending his appointment for CT scan he can consider going to the emergency room for further assessment and treatment.

## 2024-11-06 DIAGNOSIS — D64.9 LOW HEMOGLOBIN: ICD-10-CM

## 2024-11-06 DIAGNOSIS — Z12.11 COLON CANCER SCREENING: Primary | ICD-10-CM

## 2024-11-06 NOTE — RESULT ENCOUNTER NOTE
Please let patient know hemoglobin has dropped a little.  The next step is for him to have a FIT test to make sure he is not having a GI bleed.  Please Have Patient  the KIT from our office, he could then drop off  sample with the .

## 2024-11-07 ENCOUNTER — APPOINTMENT (OUTPATIENT)
Dept: CT IMAGING | Age: 64
End: 2024-11-07
Payer: COMMERCIAL

## 2024-11-07 ENCOUNTER — HOSPITAL ENCOUNTER (EMERGENCY)
Age: 64
Discharge: HOME | End: 2024-11-07
Attending: EMERGENCY MEDICINE
Payer: COMMERCIAL

## 2024-11-07 VITALS
TEMPERATURE: 98.2 F | WEIGHT: 168 LBS | SYSTOLIC BLOOD PRESSURE: 148 MMHG | DIASTOLIC BLOOD PRESSURE: 73 MMHG | HEART RATE: 81 BPM | RESPIRATION RATE: 13 BRPM | OXYGEN SATURATION: 99 % | BODY MASS INDEX: 29.77 KG/M2 | HEIGHT: 63 IN

## 2024-11-07 DIAGNOSIS — R10.11 RIGHT UPPER QUADRANT ABDOMINAL PAIN: ICD-10-CM

## 2024-11-07 DIAGNOSIS — K29.90 GASTRITIS AND DUODENITIS: Primary | ICD-10-CM

## 2024-11-07 LAB
ALBUMIN SERPL-MCNC: 4.1 G/DL (ref 3.4–5)
ALP SERPL-CCNC: 138 U/L (ref 40–129)
ALT SERPL-CCNC: 21 U/L (ref 10–40)
ANION GAP SERPL CALCULATED.3IONS-SCNC: 13 MMOL/L (ref 3–16)
AST SERPL-CCNC: 25 U/L (ref 15–37)
BASOPHILS # BLD: 0.1 K/UL (ref 0–0.2)
BASOPHILS NFR BLD: 1 %
BILIRUB DIRECT SERPL-MCNC: <0.1 MG/DL (ref 0–0.3)
BILIRUB INDIRECT SERPL-MCNC: 0.2 MG/DL (ref 0–1)
BILIRUB SERPL-MCNC: 0.3 MG/DL (ref 0–1)
BILIRUB UR QL STRIP.AUTO: NEGATIVE
BUN SERPL-MCNC: 16 MG/DL (ref 7–20)
CALCIUM SERPL-MCNC: 9.1 MG/DL (ref 8.3–10.6)
CHLORIDE SERPL-SCNC: 98 MMOL/L (ref 99–110)
CLARITY UR: CLEAR
CO2 SERPL-SCNC: 21 MMOL/L (ref 21–32)
COLOR UR: YELLOW
CREAT SERPL-MCNC: 1.4 MG/DL (ref 0.8–1.3)
DEPRECATED RDW RBC AUTO: 13.5 % (ref 12.4–15.4)
EOSINOPHIL # BLD: 0.5 K/UL (ref 0–0.6)
EOSINOPHIL NFR BLD: 5.6 %
GFR SERPLBLD CREATININE-BSD FMLA CKD-EPI: 56 ML/MIN/{1.73_M2}
GLUCOSE SERPL-MCNC: 206 MG/DL (ref 70–99)
GLUCOSE UR STRIP.AUTO-MCNC: NEGATIVE MG/DL
HCT VFR BLD AUTO: 33.8 % (ref 40.5–52.5)
HGB BLD-MCNC: 11.7 G/DL (ref 13.5–17.5)
HGB UR QL STRIP.AUTO: NEGATIVE
KETONES UR STRIP.AUTO-MCNC: NEGATIVE MG/DL
LEUKOCYTE ESTERASE UR QL STRIP.AUTO: NEGATIVE
LIPASE SERPL-CCNC: 35 U/L (ref 13–60)
LYMPHOCYTES # BLD: 1.7 K/UL (ref 1–5.1)
LYMPHOCYTES NFR BLD: 19.2 %
MCH RBC QN AUTO: 29.6 PG (ref 26–34)
MCHC RBC AUTO-ENTMCNC: 34.7 G/DL (ref 31–36)
MCV RBC AUTO: 85.4 FL (ref 80–100)
MONOCYTES # BLD: 0.6 K/UL (ref 0–1.3)
MONOCYTES NFR BLD: 7.4 %
NEUTROPHILS # BLD: 5.8 K/UL (ref 1.7–7.7)
NEUTROPHILS NFR BLD: 66.8 %
NITRITE UR QL STRIP.AUTO: NEGATIVE
NT-PROBNP SERPL-MCNC: <36 PG/ML (ref 0–124)
PH UR STRIP.AUTO: 5.5 [PH] (ref 5–8)
PLATELET # BLD AUTO: 261 K/UL (ref 135–450)
PMV BLD AUTO: 7.8 FL (ref 5–10.5)
POTASSIUM SERPL-SCNC: 3.7 MMOL/L (ref 3.5–5.1)
PROT SERPL-MCNC: 7.1 G/DL (ref 6.4–8.2)
PROT UR STRIP.AUTO-MCNC: NEGATIVE MG/DL
RBC # BLD AUTO: 3.96 M/UL (ref 4.2–5.9)
SODIUM SERPL-SCNC: 132 MMOL/L (ref 136–145)
SP GR UR STRIP.AUTO: 1.01 (ref 1–1.03)
TROPONIN, HIGH SENSITIVITY: 12 NG/L (ref 0–22)
UA COMPLETE W REFLEX CULTURE PNL UR: NORMAL
UA DIPSTICK W REFLEX MICRO PNL UR: NORMAL
URN SPEC COLLECT METH UR: NORMAL
UROBILINOGEN UR STRIP-ACNC: 0.2 E.U./DL
WBC # BLD AUTO: 8.6 K/UL (ref 4–11)

## 2024-11-07 PROCEDURE — 80076 HEPATIC FUNCTION PANEL: CPT

## 2024-11-07 PROCEDURE — 99284 EMERGENCY DEPT VISIT MOD MDM: CPT

## 2024-11-07 PROCEDURE — 83880 ASSAY OF NATRIURETIC PEPTIDE: CPT

## 2024-11-07 PROCEDURE — 71250 CT THORAX DX C-: CPT

## 2024-11-07 PROCEDURE — 83690 ASSAY OF LIPASE: CPT

## 2024-11-07 PROCEDURE — 80048 BASIC METABOLIC PNL TOTAL CA: CPT

## 2024-11-07 PROCEDURE — 81003 URINALYSIS AUTO W/O SCOPE: CPT

## 2024-11-07 PROCEDURE — 84484 ASSAY OF TROPONIN QUANT: CPT

## 2024-11-07 PROCEDURE — 6370000000 HC RX 637 (ALT 250 FOR IP): Performed by: EMERGENCY MEDICINE

## 2024-11-07 PROCEDURE — 85025 COMPLETE CBC W/AUTO DIFF WBC: CPT

## 2024-11-07 RX ORDER — FAMOTIDINE 20 MG/1
20 TABLET, FILM COATED ORAL 2 TIMES DAILY
Qty: 60 TABLET | Refills: 0 | Status: SHIPPED | OUTPATIENT
Start: 2024-11-07

## 2024-11-07 RX ORDER — SUCRALFATE 1 G/1
1 TABLET ORAL 4 TIMES DAILY
Qty: 120 TABLET | Refills: 0 | Status: SHIPPED | OUTPATIENT
Start: 2024-11-07

## 2024-11-07 RX ORDER — DICYCLOMINE HYDROCHLORIDE 10 MG/1
20 CAPSULE ORAL ONCE
Status: COMPLETED | OUTPATIENT
Start: 2024-11-07 | End: 2024-11-07

## 2024-11-07 RX ORDER — MAGNESIUM HYDROXIDE/ALUMINUM HYDROXICE/SIMETHICONE 120; 1200; 1200 MG/30ML; MG/30ML; MG/30ML
30 SUSPENSION ORAL ONCE
Status: COMPLETED | OUTPATIENT
Start: 2024-11-07 | End: 2024-11-07

## 2024-11-07 RX ORDER — SUCRALFATE 1 G/1
1 TABLET ORAL ONCE
Status: COMPLETED | OUTPATIENT
Start: 2024-11-07 | End: 2024-11-07

## 2024-11-07 RX ADMIN — SUCRALFATE 1 G: 1 TABLET ORAL at 18:23

## 2024-11-07 RX ADMIN — DICYCLOMINE HYDROCHLORIDE 20 MG: 10 CAPSULE ORAL at 18:23

## 2024-11-07 RX ADMIN — ALUMINUM HYDROXIDE, MAGNESIUM HYDROXIDE, AND SIMETHICONE 30 ML: 200; 200; 20 SUSPENSION ORAL at 18:24

## 2024-11-07 ASSESSMENT — ENCOUNTER SYMPTOMS
CONSTIPATION: 0
EYE REDNESS: 0
SHORTNESS OF BREATH: 0
EYE PAIN: 0
COUGH: 0
ABDOMINAL PAIN: 1
DIARRHEA: 0
BACK PAIN: 0
RHINORRHEA: 0
VOMITING: 0
NAUSEA: 1

## 2024-11-07 ASSESSMENT — PAIN DESCRIPTION - LOCATION: LOCATION: ABDOMEN;BACK

## 2024-11-07 ASSESSMENT — PAIN SCALES - GENERAL: PAINLEVEL_OUTOF10: 8

## 2024-11-07 NOTE — ED PROVIDER NOTES
[facility] EMERGENCY DEPARTMENT  EMERGENCY DEPARTMENT ENCOUNTER        Pt Name: Jose Alejandro Mesa  MRN: [id number]  Birthdate 1960  Date of evaluation: 11/7/2024  Provider: Christian Peralta DO  PCP: Chris Pena MD  Note Started: 5:54 PM EST 11/7/24    CHIEF COMPLAINT      Abdominal Pain    HISTORY OF PRESENT ILLNESS: 1 or more Elements     Chief Complaint   Patient presents with    Abdominal Pain     Pt via family from home, c/o upper abdominal pain, had an appt on the 5th, they ordered ct scan, hurts after eating, had gallbladder removed two months ago      History from : Patient and Family (Daughter)  Limitations to history : Language      Jose Alejandro Mesa is a 64 y.o. male who presents to the emergency department secondary to concern for abdominal pain.  He has been having upper abdominal pain for quite some time, however it has gotten worse over the last couple days or so.  He localized pain to the epigastric region and radiates to the right upper quadrant and into the right chest and to the right flank.  Reports history of cholecystectomy 2 months ago.  States this pain is worse after eating spicy foods.  He had a outpatient CT ordered from his primary care physician that was supposed be scheduled next Tuesday, however patient's pain worsened before then.  He is only been taking Tylenol for the pain with minimal relief.    Past medical history noted below. Aside from what is stated above denies any other symptoms or modifying factors.   reports that he has been smoking. He has never used smokeless tobacco. He reports that he does not drink alcohol and does not use drugs.  Nursing Notes were all reviewed and agreed with or any disagreements addressed in HPI/MDM.  REVIEW OF SYSTEMS :    Review of Systems   Constitutional:  Negative for chills and fever.   HENT:  Negative for congestion and rhinorrhea.    Eyes:  Negative for pain and redness.   Respiratory:  Negative for cough and

## 2024-11-15 ENCOUNTER — TELEPHONE (OUTPATIENT)
Dept: PRIMARY CARE CLINIC | Age: 64
End: 2024-11-15

## 2024-11-15 NOTE — TELEPHONE ENCOUNTER
Patient's daughter calling in to request a refill on the below medications:    Gabapentin 300 mg    Thank you.

## 2024-11-15 NOTE — TELEPHONE ENCOUNTER
I have never prescribed this medicine for this patient it is not even on his medication chart.  Will have to schedule an appointment.

## 2024-11-20 ENCOUNTER — OFFICE VISIT (OUTPATIENT)
Dept: PRIMARY CARE CLINIC | Age: 64
End: 2024-11-20

## 2024-11-20 VITALS
RESPIRATION RATE: 14 BRPM | SYSTOLIC BLOOD PRESSURE: 132 MMHG | DIASTOLIC BLOOD PRESSURE: 80 MMHG | HEIGHT: 63 IN | BODY MASS INDEX: 29.27 KG/M2 | OXYGEN SATURATION: 99 % | TEMPERATURE: 98.4 F | HEART RATE: 82 BPM | WEIGHT: 165.2 LBS

## 2024-11-20 DIAGNOSIS — Z12.11 COLON CANCER SCREENING: ICD-10-CM

## 2024-11-20 DIAGNOSIS — G89.29 CHRONIC PAIN OF LEFT KNEE: ICD-10-CM

## 2024-11-20 DIAGNOSIS — G89.29 CHRONIC LEFT SHOULDER PAIN: ICD-10-CM

## 2024-11-20 DIAGNOSIS — I10 PRIMARY HYPERTENSION: ICD-10-CM

## 2024-11-20 DIAGNOSIS — M25.562 CHRONIC PAIN OF LEFT KNEE: ICD-10-CM

## 2024-11-20 DIAGNOSIS — M25.512 CHRONIC LEFT SHOULDER PAIN: ICD-10-CM

## 2024-11-20 DIAGNOSIS — E11.42 DIABETIC POLYNEUROPATHY ASSOCIATED WITH TYPE 2 DIABETES MELLITUS (HCC): ICD-10-CM

## 2024-11-20 DIAGNOSIS — Z09 SURGERY FOLLOW-UP: ICD-10-CM

## 2024-11-20 DIAGNOSIS — E55.9 VITAMIN D DEFICIENCY: ICD-10-CM

## 2024-11-20 DIAGNOSIS — E11.9 TYPE 2 DIABETES MELLITUS WITHOUT COMPLICATION, WITHOUT LONG-TERM CURRENT USE OF INSULIN (HCC): Primary | ICD-10-CM

## 2024-11-20 DIAGNOSIS — M51.369 DDD (DEGENERATIVE DISC DISEASE), LUMBAR: ICD-10-CM

## 2024-11-20 DIAGNOSIS — F17.210 CIGARETTE SMOKER: ICD-10-CM

## 2024-11-20 DIAGNOSIS — N40.0 BENIGN PROSTATIC HYPERPLASIA WITHOUT LOWER URINARY TRACT SYMPTOMS: ICD-10-CM

## 2024-11-20 DIAGNOSIS — R91.1 PULMONARY NODULE: ICD-10-CM

## 2024-11-20 DIAGNOSIS — E78.49 OTHER HYPERLIPIDEMIA: ICD-10-CM

## 2024-11-20 DIAGNOSIS — Z23 FLU VACCINE NEED: ICD-10-CM

## 2024-11-20 RX ORDER — CHOLECALCIFEROL (VITAMIN D3) 125 MCG
CAPSULE ORAL
COMMUNITY
Start: 2024-10-16 | End: 2024-11-20

## 2024-11-20 RX ORDER — CETIRIZINE HYDROCHLORIDE 10 MG/1
1 TABLET ORAL DAILY
COMMUNITY
Start: 2024-10-03

## 2024-11-20 RX ORDER — HYDROCHLOROTHIAZIDE 25 MG/1
25 TABLET ORAL DAILY
Qty: 90 TABLET | Refills: 3 | Status: SHIPPED | OUTPATIENT
Start: 2024-11-20

## 2024-11-20 RX ORDER — CELECOXIB 200 MG/1
200 CAPSULE ORAL 2 TIMES DAILY
Qty: 60 CAPSULE | Refills: 1 | Status: SHIPPED | OUTPATIENT
Start: 2024-11-20 | End: 2024-11-20 | Stop reason: SDUPTHER

## 2024-11-20 RX ORDER — IBUPROFEN 600 MG/1
600 TABLET, FILM COATED ORAL 3 TIMES DAILY PRN
Qty: 90 TABLET | Refills: 1 | OUTPATIENT
Start: 2024-11-20

## 2024-11-20 RX ORDER — TAMSULOSIN HYDROCHLORIDE 0.4 MG/1
0.4 CAPSULE ORAL NIGHTLY
Qty: 90 CAPSULE | Refills: 3 | Status: SHIPPED | OUTPATIENT
Start: 2024-11-20

## 2024-11-20 RX ORDER — ATORVASTATIN CALCIUM 80 MG/1
80 TABLET, FILM COATED ORAL NIGHTLY
Qty: 90 TABLET | Refills: 3 | Status: SHIPPED | OUTPATIENT
Start: 2024-11-20

## 2024-11-20 RX ORDER — CELECOXIB 200 MG/1
200 CAPSULE ORAL 2 TIMES DAILY
Qty: 60 CAPSULE | Refills: 3 | Status: SHIPPED | OUTPATIENT
Start: 2024-11-20

## 2024-11-20 RX ORDER — GABAPENTIN 300 MG/1
300 CAPSULE ORAL 3 TIMES DAILY
Qty: 270 CAPSULE | Refills: 3 | Status: SHIPPED | OUTPATIENT
Start: 2024-11-20 | End: 2025-11-15

## 2024-11-20 RX ORDER — GABAPENTIN 300 MG/1
300 CAPSULE ORAL 3 TIMES DAILY
COMMUNITY
End: 2024-11-20 | Stop reason: SDUPTHER

## 2024-11-20 RX ORDER — CELECOXIB 200 MG/1
200 CAPSULE ORAL 2 TIMES DAILY
Qty: 60 CAPSULE | Refills: 2 | OUTPATIENT
Start: 2024-11-20

## 2024-11-20 ASSESSMENT — ENCOUNTER SYMPTOMS
TROUBLE SWALLOWING: 0
SHORTNESS OF BREATH: 0
COUGH: 0
ABDOMINAL PAIN: 0
EYE PAIN: 0
SORE THROAT: 0
DIARRHEA: 0
CONSTIPATION: 0
BACK PAIN: 0
COLOR CHANGE: 0
NAUSEA: 0
VOMITING: 0
EYE REDNESS: 0
WHEEZING: 0
BLOOD IN STOOL: 0
CHEST TIGHTNESS: 0
ABDOMINAL DISTENTION: 0
SINUS PRESSURE: 0

## 2024-11-20 NOTE — ASSESSMENT & PLAN NOTE
patient is a cigarette smoker has right-sided 4 mm lung nodule, incidental finding of right-sided rib pain  Patient referred to pulmonology

## 2024-11-20 NOTE — PROGRESS NOTES
Man Ingris Mesa (1960) is a 64 y.o. male   Follow-up     General health:  This patient presents for check up and refills. The problem and medicine lists and chart were reviewed in detail. The patient has been worked up and treated for this/these condition/s and is compliant with taking the medication without any significant side effects. The patient's condition/s is/are chronic and unchanged and otherwise remains stable. Feels well with minor complaints.    Main Problem Review - T2DM  - Patient's blood sugars have been stable. Patient denies complaints or symptoms today no polyuria or polydipsia. Patient mentions adherence with treatment.  Patient eats a low-carb diet, pays attention to food portion size.   Patient denies chest pain or shortness of breath with exertion or at rest.       Other problems review    1. HTN  - Patient blood pressure is stable today. Patient denies complaints or symptoms today. Patient mentions he is adherent with treatment. Patient denies chest pain or shortness of breath with exertion or at rest. Adherent to low salt diet.     2. HLD  - Patient denies complaints or symptoms of chest pain/shortness of breath with exertion or at rest.  Patient mentions they are adherent with treatment.  Patient follows a low-fat diet, tries to exercise.     3. BPH  - Patient denies complaints or symptoms today. Patient mentions he is adherent with treatment.     Health Maintenance     Annual retinal eye exam - scheduled   Annual foot exam -  Annual Dentist visit -  Tobacco smoking - YES  Alcohol Misuse -  NO  Illicit Drug Use-  NO  Healthy Diet and physical activity -  Obesity Screen- screened  Wears seat belt-  YES  End of life directives discussed with patient.-Mentions he does not have  a will/or/an advanced directives.  Was instructed to start process looking into preparing his will an advanced directives.        The ASCVD Risk score (Bernard WISE, et al., 2019) failed to calculate for the following

## 2024-11-20 NOTE — TELEPHONE ENCOUNTER
Medication:   Requested Prescriptions     Pending Prescriptions Disp Refills    celecoxib (CELEBREX) 200 MG capsule [Pharmacy Med Name: CELECOXIB 200 MG CAPS 200 Capsule] 60 capsule 2     Sig: TAKE 1 CAPSULE BY MOUTH 2 TIMES DAILY      Last Filled:  11/20/24    Patient Phone Number: [phone] (home)     Last appt: 11/5/2024   Next appt: 11/20/2024    Last OARRS:        No data to display              PDMP Monitoring:    Last PDMP [place] as Reviewed ([place]):  Review User Review Instant Review Result          Preferred Pharmacy:   [facility] - [place], [place] - [address] [phone] - F [phone]  [address] [place] [address]  Phone: [phone] Fax: [phone]    Recent Visits  Date Type Provider Dept   11/05/24 Office Visit Chris Pena MD [facility]   08/06/24 Office Visit Chris Pena MD [facility]   06/17/24 Office Visit Chris Pena MD [facility]   Showing recent visits within past 540 days with a meds authorizing provider and meeting all other requirements  Today's Visits  Date Type Provider Dept   11/20/24 Office Visit Chris Pena MD [facility]   Showing today's visits with a meds authorizing provider and meeting all other requirements  Future Appointments  Date Type Provider Dept   12/03/24 Appointment Chris Pena MD [facility]   Showing future appointments within next 150 days with a meds authorizing provider and meeting all other requirements     11/5/2024

## 2024-11-20 NOTE — TELEPHONE ENCOUNTER
Medication:   Requested Prescriptions     Pending Prescriptions Disp Refills    celecoxib (CELEBREX) 200 MG capsule [Pharmacy Med Name: CELECOXIB 200 MG CAPS 200 Capsule] 60 capsule 1     Sig: TAKE 1 CAPSULE BY MOUTH 2 TIMES DAILY      Last Filled:  8/13/24    Patient Phone Number: [phone] (home)     Last appt: 11/5/2024   Next appt: 11/20/2024    Last OARRS:        No data to display              PDMP Monitoring:    Last PDMP [place] as Reviewed ([place]):  Review User Review Instant Review Result          Preferred Pharmacy:   [facility] - [place], [place] - [address] [phone] - F [phone]  [address] [place] [address]  Phone: [phone] Fax: [phone]    Recent Visits  Date Type Provider Dept   11/05/24 Office Visit Chris Pena MD [facility]   08/06/24 Office Visit Chris Pena MD [facility]   06/17/24 Office Visit Chris Pena MD [facility]   Showing recent visits within past 540 days with a meds authorizing provider and meeting all other requirements  Today's Visits  Date Type Provider Dept   11/20/24 Office Visit Chris Pena MD [facility]   Showing today's visits with a meds authorizing provider and meeting all other requirements  Future Appointments  Date Type Provider Dept   12/03/24 Appointment Chris Pena MD [facility]   Showing future appointments within next 150 days with a meds authorizing provider and meeting all other requirements     11/5/2024

## 2024-12-03 ENCOUNTER — OFFICE VISIT (OUTPATIENT)
Dept: PRIMARY CARE CLINIC | Age: 64
End: 2024-12-03
Payer: COMMERCIAL

## 2024-12-03 VITALS
BODY MASS INDEX: 30.88 KG/M2 | HEIGHT: 62 IN | RESPIRATION RATE: 16 BRPM | DIASTOLIC BLOOD PRESSURE: 80 MMHG | OXYGEN SATURATION: 99 % | TEMPERATURE: 98.4 F | SYSTOLIC BLOOD PRESSURE: 128 MMHG | WEIGHT: 167.8 LBS | HEART RATE: 68 BPM

## 2024-12-03 DIAGNOSIS — Z12.5 SCREENING PSA (PROSTATE SPECIFIC ANTIGEN): ICD-10-CM

## 2024-12-03 DIAGNOSIS — I10 PRIMARY HYPERTENSION: ICD-10-CM

## 2024-12-03 DIAGNOSIS — E11.42 DIABETIC POLYNEUROPATHY ASSOCIATED WITH TYPE 2 DIABETES MELLITUS (HCC): ICD-10-CM

## 2024-12-03 DIAGNOSIS — E78.49 OTHER HYPERLIPIDEMIA: ICD-10-CM

## 2024-12-03 DIAGNOSIS — R53.83 OTHER FATIGUE: ICD-10-CM

## 2024-12-03 DIAGNOSIS — G47.33 OSA (OBSTRUCTIVE SLEEP APNEA): ICD-10-CM

## 2024-12-03 DIAGNOSIS — Z00.00 ENCOUNTER FOR WELL ADULT EXAM WITHOUT ABNORMAL FINDINGS: Primary | ICD-10-CM

## 2024-12-03 DIAGNOSIS — N40.0 BENIGN PROSTATIC HYPERPLASIA WITHOUT LOWER URINARY TRACT SYMPTOMS: ICD-10-CM

## 2024-12-03 DIAGNOSIS — E55.9 VITAMIN D DEFICIENCY: ICD-10-CM

## 2024-12-03 DIAGNOSIS — E11.9 TYPE 2 DIABETES MELLITUS WITHOUT COMPLICATION, WITHOUT LONG-TERM CURRENT USE OF INSULIN (HCC): ICD-10-CM

## 2024-12-03 PROCEDURE — 99396 PREV VISIT EST AGE 40-64: CPT | Performed by: INTERNAL MEDICINE

## 2024-12-03 PROCEDURE — G8484 FLU IMMUNIZE NO ADMIN: HCPCS | Performed by: INTERNAL MEDICINE

## 2024-12-03 PROCEDURE — 3074F SYST BP LT 130 MM HG: CPT | Performed by: INTERNAL MEDICINE

## 2024-12-03 PROCEDURE — 3079F DIAST BP 80-89 MM HG: CPT | Performed by: INTERNAL MEDICINE

## 2024-12-03 ASSESSMENT — ENCOUNTER SYMPTOMS
COLOR CHANGE: 0
SORE THROAT: 0
EYE PAIN: 0
NAUSEA: 0
BACK PAIN: 0
COUGH: 0
ABDOMINAL PAIN: 0
CHEST TIGHTNESS: 0
SINUS PRESSURE: 0
EYE REDNESS: 0
DIARRHEA: 0
VOMITING: 0
WHEEZING: 0
ABDOMINAL DISTENTION: 0
TROUBLE SWALLOWING: 0
SHORTNESS OF BREATH: 0
BLOOD IN STOOL: 0
CONSTIPATION: 0

## 2024-12-03 NOTE — PATIENT INSTRUCTIONS
cause a range of symptoms, including confusion. But, what if your nutritional needs are being met? Can herbs and supplements still offer a benefit? Researchers have investigated a range of natural remedies, such as ginkgo , ginseng , and the supplement phosphatidylserine (PS). So far, though, the evidence is inconsistent as to whether these products can improve memory or thinking.   If you are interested in taking herbs and supplements, talk to your doctor first because they may interact with other medicines that you are taking.   Exercise Regularly    Among the many benefits of regular exercise are increased blood flow to the brain and decreased risk of certain diseases that can interfere with memory function. One study found that even moderate exercise has a beneficial effect. Examples of \"moderate\" exercise include:   Playing 18 holes of golf once a week, without a cart   Playing tennis twice a week   Walking one mile per day   Manage Stress    It can be tough to remember what is important when your mind is cluttered. Make time for relaxation. Choose activities that calm you down, and make it routine.   Manage Chronic Conditions    Side effects of high blood pressure , diabetes, and heart disease can interfere with mental function. Many of the lifestyle steps discussed here can help manage these conditions. Strive to eat a healthy diet, exercise regularly, get stress under control, and follow your doctor's advice for your condition.   Minimize Medications    Talk to your doctor about the medicines that you take. Some may be unnecessary. Also, healthy lifestyle habits may lower the need for certain drugs.     Last Reviewed: April 2010 Jonny Ferris MD   Updated: 4/13/2010          A Healthy Lifestyle: Care Instructions  A healthy lifestyle can help you feel good, have more energy, and stay at a weight that's healthy for you. You can share a healthy lifestyle with your friends and family. And you can do it on your

## 2024-12-09 ENCOUNTER — OFFICE VISIT (OUTPATIENT)
Dept: PULMONOLOGY | Age: 64
End: 2024-12-09
Payer: COMMERCIAL

## 2024-12-09 VITALS
BODY MASS INDEX: 29.84 KG/M2 | OXYGEN SATURATION: 98 % | HEIGHT: 63 IN | WEIGHT: 168.4 LBS | HEART RATE: 64 BPM | SYSTOLIC BLOOD PRESSURE: 124 MMHG | DIASTOLIC BLOOD PRESSURE: 80 MMHG

## 2024-12-09 DIAGNOSIS — I10 PRIMARY HYPERTENSION: ICD-10-CM

## 2024-12-09 DIAGNOSIS — J30.9 ALLERGIC RHINITIS, UNSPECIFIED SEASONALITY, UNSPECIFIED TRIGGER: Primary | ICD-10-CM

## 2024-12-09 DIAGNOSIS — R06.09 DOE (DYSPNEA ON EXERTION): Primary | ICD-10-CM

## 2024-12-09 DIAGNOSIS — K21.9 GASTROESOPHAGEAL REFLUX DISEASE WITHOUT ESOPHAGITIS: ICD-10-CM

## 2024-12-09 DIAGNOSIS — F17.210 CIGARETTE NICOTINE DEPENDENCE WITHOUT COMPLICATION: ICD-10-CM

## 2024-12-09 DIAGNOSIS — R91.8 MULTIPLE LUNG NODULES ON CT: ICD-10-CM

## 2024-12-09 DIAGNOSIS — J44.9 COPD, SEVERITY TO BE DETERMINED (HCC): ICD-10-CM

## 2024-12-09 PROCEDURE — 3074F SYST BP LT 130 MM HG: CPT | Performed by: INTERNAL MEDICINE

## 2024-12-09 PROCEDURE — 3079F DIAST BP 80-89 MM HG: CPT | Performed by: INTERNAL MEDICINE

## 2024-12-09 PROCEDURE — 3023F SPIROM DOC REV: CPT | Performed by: INTERNAL MEDICINE

## 2024-12-09 PROCEDURE — 99407 BEHAV CHNG SMOKING > 10 MIN: CPT | Performed by: INTERNAL MEDICINE

## 2024-12-09 PROCEDURE — G8417 CALC BMI ABV UP PARAM F/U: HCPCS | Performed by: INTERNAL MEDICINE

## 2024-12-09 PROCEDURE — 99244 OFF/OP CNSLTJ NEW/EST MOD 40: CPT | Performed by: INTERNAL MEDICINE

## 2024-12-09 PROCEDURE — G8484 FLU IMMUNIZE NO ADMIN: HCPCS | Performed by: INTERNAL MEDICINE

## 2024-12-09 PROCEDURE — G8427 DOCREV CUR MEDS BY ELIG CLIN: HCPCS | Performed by: INTERNAL MEDICINE

## 2024-12-09 RX ORDER — FAMOTIDINE 20 MG/1
20 TABLET, FILM COATED ORAL 2 TIMES DAILY
Qty: 60 TABLET | Refills: 11 | Status: SHIPPED | OUTPATIENT
Start: 2024-12-09

## 2024-12-09 RX ORDER — CETIRIZINE HYDROCHLORIDE 10 MG/1
10 TABLET ORAL DAILY
Qty: 30 TABLET | Refills: 11 | Status: SHIPPED | OUTPATIENT
Start: 2024-12-09

## 2024-12-09 RX ORDER — LANOLIN ALCOHOL/MO/W.PET/CERES
1 CREAM (GRAM) TOPICAL DAILY
COMMUNITY
Start: 2024-10-16

## 2024-12-09 RX ORDER — ASPIRIN 81 MG/1
1 TABLET ORAL DAILY
COMMUNITY
Start: 2024-04-11

## 2024-12-09 RX ORDER — LOSARTAN POTASSIUM 100 MG/1
100 TABLET ORAL DAILY
Qty: 90 TABLET | Refills: 1 | Status: SHIPPED | OUTPATIENT
Start: 2024-12-09

## 2024-12-09 RX ORDER — VARENICLINE TARTRATE 0.5 MG/1
.5-1 TABLET, FILM COATED ORAL SEE ADMIN INSTRUCTIONS
Qty: 57 TABLET | Refills: 3 | Status: SHIPPED | OUTPATIENT
Start: 2024-12-09

## 2024-12-09 ASSESSMENT — ENCOUNTER SYMPTOMS
ABDOMINAL DISTENTION: 0
VOICE CHANGE: 0
SHORTNESS OF BREATH: 1
DIARRHEA: 0
SORE THROAT: 0
APNEA: 0
RHINORRHEA: 0
CONSTIPATION: 0
CHOKING: 0
BACK PAIN: 0
CHEST TIGHTNESS: 0
VOMITING: 0
ABDOMINAL PAIN: 0
SINUS PRESSURE: 0
WHEEZING: 0
COLOR CHANGE: 0
STRIDOR: 0
BLOOD IN STOOL: 0
COUGH: 1

## 2024-12-09 NOTE — PROGRESS NOTES
Jose Alejandro Mesa    YOB: 1960     Date of Service:  12/9/2024     Chief Complaint   Patient presents with    Pulmonary Nodule     Stated he has had no symptoms other than at night he has difficulty breathing. It will usually wake him.        HPI patient has been referred for consultation by Chris Pena MD for evaluation of lung nodules.  Patient has been accompanied by his daughter to our office today who provided me with all of the history.  Patient presented to the ER with upper abdominal pain on 11/7.  CT chest/abdomen and pelvis revealed multiple small lung nodules, noncalcified measuring up to 4 mm and hence patient has now been referred for a pulmonary consultation.    Patient states that he has some dyspnea with exertion and this is chronic and persistent for several years.  He does have a cough with mucoid phlegm.  Denies any chest pain, has some leg edema.    Smoker, up to half pack per day since he was very young.  No prior diagnosis of chronic bronchitis or COPD, does not use inhalers.  No history of CAD or CHF.  History of hypertension/T2DM.  Originally from [facility], [place].  Worked in the  before.  Patient states that he was tested for TB prior to arrival to [place] and was noted to be negative.    Allergies   Allergen Reactions    Lisinopril Rash     Outpatient Medications Marked as Taking for the 12/9/24 encounter (Office Visit) with Samir Abad MD   Medication Sig Dispense Refill    vitamin B-12 (CYANOCOBALAMIN) 1000 MCG tablet Take 1 tablet by mouth daily      aspirin 81 MG EC tablet Take 1 tablet by mouth daily      varenicline (CHANTIX) 0.5 MG tablet Take 1-2 tablets by mouth See Admin Instructions 0.5mg DAILY for 3 days followed by 0.5mg TWICE DAILY for 4 days followed by 1mg TWICE DAILY 57 tablet 3    cetirizine (ZYRTEC) 10 MG tablet Take 1 tablet by mouth daily      hydroCHLOROthiazide (HYDRODIURIL) 25 MG tablet Take 1 tablet by mouth daily

## 2024-12-17 ENCOUNTER — HOSPITAL ENCOUNTER (OUTPATIENT)
Dept: PULMONOLOGY | Age: 64
Discharge: HOME | End: 2024-12-17
Attending: INTERNAL MEDICINE
Payer: COMMERCIAL

## 2024-12-17 DIAGNOSIS — R06.09 DOE (DYSPNEA ON EXERTION): ICD-10-CM

## 2024-12-17 LAB
DLCO %PRED: 78 %
DLCO PRED: NORMAL
DLCO/VA %PRED: NORMAL
DLCO/VA PRED: NORMAL
DLCO/VA: NORMAL
DLCO: NORMAL
EXPIRATORY TIME-POST: NORMAL
EXPIRATORY TIME: NORMAL
FEF 25-75 %CHNG: NORMAL
FEF 25-75 POST %PRED: NORMAL
FEF 25-75% %PRED-PRE: NORMAL
FEF 25-75% PRED: NORMAL
FEF 25-75-POST: NORMAL
FEF 25-75-PRE: NORMAL
FEV1 %PRED-POST: 54 %
FEV1 %PRED-PRE: 66 %
FEV1 PRED: NORMAL
FEV1-POST: NORMAL
FEV1-PRE: NORMAL
FEV1/FVC %PRED-POST: NORMAL
FEV1/FVC %PRED-PRE: NORMAL
FEV1/FVC PRED: NORMAL
FEV1/FVC-POST: 72 %
FEV1/FVC-PRE: 90 %
FVC %PRED-POST: NORMAL
FVC %PRED-PRE: NORMAL
FVC PRED: NORMAL
FVC-POST: NORMAL
FVC-PRE: NORMAL
GAW %PRED: NORMAL
GAW PRED: NORMAL
GAW: NORMAL
IC PRE %PRED: NORMAL
IC PRED: NORMAL
IC: NORMAL
MEP: NORMAL
MIP: NORMAL
MVV %PRED-PRE: NORMAL
MVV PRED: NORMAL
MVV-PRE: NORMAL
PEF %PRED-POST: NORMAL
PEF %PRED-PRE: NORMAL
PEF PRED: NORMAL
PEF%CHNG: NORMAL
PEF-POST: NORMAL
PEF-PRE: NORMAL
RAW %PRED: NORMAL
RAW PRED: NORMAL
RAW: NORMAL
RV PRE %PRED: NORMAL
RV PRED: NORMAL
RV: NORMAL
SVC %PRED: NORMAL
SVC PRED: NORMAL
SVC: NORMAL
TLC PRE %PRED: 79 %
TLC PRED: NORMAL
TLC: NORMAL
VA %PRED: NORMAL
VA PRED: NORMAL
VA: NORMAL
VTG %PRED: NORMAL
VTG PRED: NORMAL
VTG: NORMAL

## 2024-12-17 PROCEDURE — 94726 PLETHYSMOGRAPHY LUNG VOLUMES: CPT

## 2024-12-17 PROCEDURE — 94729 DIFFUSING CAPACITY: CPT

## 2024-12-17 PROCEDURE — 6370000000 HC RX 637 (ALT 250 FOR IP): Performed by: INTERNAL MEDICINE

## 2024-12-17 PROCEDURE — 94760 N-INVAS EAR/PLS OXIMETRY 1: CPT

## 2024-12-17 PROCEDURE — 94060 EVALUATION OF WHEEZING: CPT

## 2024-12-17 RX ORDER — ALBUTEROL SULFATE 90 UG/1
4 INHALANT RESPIRATORY (INHALATION) ONCE
Status: COMPLETED | OUTPATIENT
Start: 2024-12-17 | End: 2024-12-17

## 2024-12-17 RX ADMIN — Medication 4 PUFF: at 10:11

## 2024-12-17 ASSESSMENT — PULMONARY FUNCTION TESTS
FEV1_PERCENT_PREDICTED_POST: 54
FEV1_PERCENT_PREDICTED_PRE: 66
FEV1/FVC_PRE: 90
FEV1/FVC_POST: 72

## 2024-12-18 NOTE — PROCEDURES
Pulmonary Function Testing      Patient name:  Jose Alejandro Mesa      Unit #:   [id number]   Date of test: 12/17/2024  Date of interpretation:   12/18/2024    Mr. Jose Alejandro Mesa is a 64 y.o. year-old.  The spirometry data were acceptable and reproducible.     Spirometry:  Flow volume loops were normal. The FEV-1/FVC ratio was normal . The  best FEV-1 was 1.66 liters (66% of predicted) (Z score: -2.07), which was mildly decreased. The FVC was 2.3 liters (72% of predicted) (Z score: -1.77), which was decreased. Response to inhaled bronchodilators (albuterol) was not significant.    Lung volumes:  Lung volumes were tested by plethysmography. The total lung capacity was 4.42 liters (79% of predicted), which was decreased. The residual volume was 2.41 liters (122% of predicted), which was increased. The ratio of residual volume to total lung capacity (RV/TLC) was 159%, which was increased.     Diffusion capacity was found to be (78% of predicted) which was decreased.       Interpretation:  Possible mild obstructive airway disease with evidence of air trapping and mildly reduced diffusion capacity      Ham Clark MD, [facility]  [facility] Pulmonary, Critical Care and Sleep Medicine  [address], [facility] [address], [place] [address]      Z-scores are recommended over fixed percent predicted thresholds to classify the severity of airflow obstruction.  Using Z-scores can reduce age and hide by, and the recommended threshold correlate with mortality risk.  The recommended Z-score threshold for different severities of airflow obstruction are:    Mild: -1.645 to -2.5  Moderate: -2.5 to -4.0  Severe:> -4.0

## 2025-01-02 PROBLEM — Z00.00 ENCOUNTER FOR WELL ADULT EXAM WITHOUT ABNORMAL FINDINGS: Status: RESOLVED | Noted: 2024-12-03 | Resolved: 2025-01-02

## 2025-01-08 ENCOUNTER — OFFICE VISIT (OUTPATIENT)
Dept: PULMONOLOGY | Age: 65
End: 2025-01-08

## 2025-01-08 VITALS
HEART RATE: 77 BPM | HEIGHT: 63 IN | BODY MASS INDEX: 30.33 KG/M2 | OXYGEN SATURATION: 96 % | SYSTOLIC BLOOD PRESSURE: 118 MMHG | DIASTOLIC BLOOD PRESSURE: 82 MMHG | WEIGHT: 171.2 LBS

## 2025-01-08 DIAGNOSIS — R91.8 LUNG NODULE, MULTIPLE: ICD-10-CM

## 2025-01-08 DIAGNOSIS — F17.210 CIGARETTE NICOTINE DEPENDENCE WITHOUT COMPLICATION: ICD-10-CM

## 2025-01-08 DIAGNOSIS — J44.9 CHRONIC OBSTRUCTIVE PULMONARY DISEASE, UNSPECIFIED COPD TYPE (HCC): Primary | ICD-10-CM

## 2025-01-08 RX ORDER — ALBUTEROL SULFATE 90 UG/1
2 INHALANT RESPIRATORY (INHALATION) 4 TIMES DAILY PRN
Qty: 18 G | Refills: 5 | Status: SHIPPED | OUTPATIENT
Start: 2025-01-08

## 2025-01-08 ASSESSMENT — ENCOUNTER SYMPTOMS
COLOR CHANGE: 0
WHEEZING: 0
SHORTNESS OF BREATH: 1
VOICE CHANGE: 0
ABDOMINAL DISTENTION: 0
RHINORRHEA: 0
BLOOD IN STOOL: 0
APNEA: 0
SORE THROAT: 0
DIARRHEA: 0
SINUS PRESSURE: 0
ABDOMINAL PAIN: 0
VOMITING: 0
STRIDOR: 0
CONSTIPATION: 0
CHOKING: 0
BACK PAIN: 0
COUGH: 1
CHEST TIGHTNESS: 0

## 2025-01-08 NOTE — PROGRESS NOTES
stool, constipation, diarrhea and vomiting.   Musculoskeletal:  Negative for arthralgias, back pain and gait problem.   Skin:  Negative for color change, pallor and rash.   Allergic/Immunologic: Negative for environmental allergies.   Neurological:  Negative for dizziness, tremors, seizures, syncope, speech difficulty, weakness, light-headedness, numbness and headaches.   Hematological:  Negative for adenopathy. Does not bruise/bleed easily.   Psychiatric/Behavioral:  Negative for sleep disturbance.        Vitals:    01/08/25 1127   BP: 118/82   Site: Left Upper Arm   Position: Sitting   Cuff Size: Medium Adult   Pulse: 77   SpO2: 96%   Weight: 77.7 kg (171 lb 3.2 oz)   Height: 1.6 m (5' 3\")          No data to display               Body mass index is 30.33 kg/m².     Wt Readings from Last 3 Encounters:   01/08/25 77.7 kg (171 lb 3.2 oz)   12/09/24 76.4 kg (168 lb 6.4 oz)   12/03/24 76.1 kg (167 lb 12.8 oz)     BP Readings from Last 3 Encounters:   01/08/25 118/82   12/09/24 124/80   12/03/24 128/80         Physical Exam  Constitutional:       General: He is not in acute distress.     Appearance: He is well-developed. He is not ill-appearing or diaphoretic.   HENT:      Nose: No congestion or rhinorrhea.      Mouth/Throat:      Pharynx: No oropharyngeal exudate or posterior oropharyngeal erythema.   Cardiovascular:      Rate and Rhythm: Normal rate and regular rhythm.      Heart sounds: Normal heart sounds. No murmur heard.     No friction rub.   Pulmonary:      Effort: No respiratory distress.      Breath sounds: Normal breath sounds. No wheezing, rhonchi or rales.   Chest:      Chest wall: No tenderness.   Abdominal:      General: There is no distension.      Palpations: There is no mass.      Tenderness: There is no abdominal tenderness. There is no guarding or rebound.   Musculoskeletal:         General: No swelling, tenderness or deformity.   Lymphadenopathy:      Cervical: No cervical adenopathy.   Skin:

## 2025-03-26 DIAGNOSIS — F17.210 CIGARETTE NICOTINE DEPENDENCE WITHOUT COMPLICATION: Primary | ICD-10-CM

## 2025-03-26 DIAGNOSIS — I10 PRIMARY HYPERTENSION: ICD-10-CM

## 2025-03-26 DIAGNOSIS — E11.9 TYPE 2 DIABETES MELLITUS WITHOUT COMPLICATION, WITHOUT LONG-TERM CURRENT USE OF INSULIN: ICD-10-CM

## 2025-03-26 RX ORDER — LOSARTAN POTASSIUM 100 MG/1
100 TABLET ORAL DAILY
Qty: 90 TABLET | Refills: 1 | Status: SHIPPED | OUTPATIENT
Start: 2025-03-26

## 2025-03-26 RX ORDER — VARENICLINE TARTRATE 0.5 MG/1
TABLET, FILM COATED ORAL
Qty: 56 TABLET | Refills: 3 | Status: SHIPPED | OUTPATIENT
Start: 2025-03-26

## 2025-04-03 ENCOUNTER — TELEPHONE (OUTPATIENT)
Dept: ORTHOPEDIC SURGERY | Age: 65
End: 2025-04-03

## 2025-04-03 NOTE — TELEPHONE ENCOUNTER
----- Message from Iesha OBRIEN sent at 4/3/2025  9:10 AM EDT -----  Regarding: Specialty  Needed  Specialty  Needed    Reason for Request: Appointment Scheduled  Type of : Non-English Speaking  Language Needed: Pashto  --------------------------------------------------------------------------------------------------------------------------    Scheduled Appointment Information:  Appointment Date 04/11/2025:  Appointment Time 11:00 AM:  Provider Name: ANA Perez Information (If Applicable)  Previous Appointment Date (RAYMOND/DANIEL/YYYY):      Additional Information:   --------------------------------------------------------------------------------------------------------------------------    Relationship to Patient: Self    Call Back Info: OK to leave message on voicemail  Preferred Call Back Number: Phone [phone]

## 2025-04-10 ENCOUNTER — TELEPHONE (OUTPATIENT)
Dept: ORTHOPEDIC SURGERY | Age: 65
End: 2025-04-10

## 2025-04-10 NOTE — TELEPHONE ENCOUNTER
----- Message from Kaye COLINDRES sent at 4/10/2025 12:31 PM EDT -----  Specialty  Needed    Reason for Request: Appointment Scheduled  Type of : Non-English Speaking  Language Needed: Wolof  --------------------------------------------------------------------------------------------------------------------------    Scheduled Appointment Information:  Appointment Date 04/14/25  Appointment Time 1:00PM   Provider Name: RONNY PEREZ     Reschedule Information WE JUST RESCHEDULE FROM 04/11/25 TO 04/14/25  Previous Appointment Date 04/11/25      Additional Information: PATIENT NEEDED TO RESCHEDULE HAD TRANSPORTATION ISSUES   --------------------------------------------------------------------------------------------------------------------------    Relationship to Patient: Beverly SKAGGS     Call Back Info: CALL LV ON PHONE   Preferred Call Back Number: [phone]

## 2025-04-11 NOTE — TELEPHONE ENCOUNTER
Iesha Gross [facility] Clinical Support  Specialty  Needed    Reason for Request: Appointment Rescheduled  Type of : Non-English Speaking  Language Needed: Slovenian  --------------------------------------------------------------------------------------------------------------------------    Scheduled Appointment Information:  Appointment Date 04/18/2025:  Appointment Time : 8:30 AM  Provider Name: (Replace with Provider Name)    Reschedule Information (If Applicable)  Previous Appointment Date (RAYMOND/DANIEL/YYYY): ANA      Additional Information:  --------------------------------------------------------------------------------------------------------------------------    Relationship to Patient: Self    Call Back Info: OK to leave message on voicemail  Preferred Call Back Number: Phone [phone]

## 2025-04-18 ENCOUNTER — OFFICE VISIT (OUTPATIENT)
Dept: ORTHOPEDIC SURGERY | Age: 65
End: 2025-04-18
Payer: COMMERCIAL

## 2025-04-18 VITALS — WEIGHT: 171 LBS | BODY MASS INDEX: 30.3 KG/M2 | HEIGHT: 63 IN

## 2025-04-18 DIAGNOSIS — M23.204 DEGENERATIVE TEAR OF LEFT MEDIAL MENISCUS: ICD-10-CM

## 2025-04-18 DIAGNOSIS — M94.262 CHONDROMALACIA OF LEFT KNEE: Primary | ICD-10-CM

## 2025-04-18 PROCEDURE — G8427 DOCREV CUR MEDS BY ELIG CLIN: HCPCS | Performed by: ORTHOPAEDIC SURGERY

## 2025-04-18 PROCEDURE — G8417 CALC BMI ABV UP PARAM F/U: HCPCS | Performed by: ORTHOPAEDIC SURGERY

## 2025-04-18 PROCEDURE — 1123F ACP DISCUSS/DSCN MKR DOCD: CPT | Performed by: ORTHOPAEDIC SURGERY

## 2025-04-18 PROCEDURE — 4004F PT TOBACCO SCREEN RCVD TLK: CPT | Performed by: ORTHOPAEDIC SURGERY

## 2025-04-18 PROCEDURE — 3017F COLORECTAL CA SCREEN DOC REV: CPT | Performed by: ORTHOPAEDIC SURGERY

## 2025-04-18 PROCEDURE — 99214 OFFICE O/P EST MOD 30 MIN: CPT | Performed by: ORTHOPAEDIC SURGERY

## 2025-04-18 NOTE — PROGRESS NOTES
ORTHOPAEDIC OFFICE NOTE    Chief Complaint   Patient presents with    New Patient     L knee        HPI  4/18/25 Portuguese  present  Patient returns to clinic today for new problem: Left knee pain  He reports his left knee started hurting approximately 2 months ago  There was no injury or trauma at that time  He reports he did injure his left knee over 20 years ago while playing soccer  Pain is worse medially, some pain laterally  Pain is rated 8 out of 10  He does describe occasional catching and locking type symptoms  Pain is worse with walking, activities  He has not tried any over-the-counter medications or other treatment thus far        7/1/24 Portuguese  present  64 y.o. male RHD seen in consultation at the request of Chris Pena MD for evaluation of left shoulder pain:  Third opinion  Previously saw Dr Wahl for this, then Dr Rodriguez at [facility]  He reports the symptoms have been going on for few years  The pain is mainly superiorly, into the neck  He does describe intermittent radiation, numbness and tingling in the left middle finger  He reports he had a corticosteroid injection in 2022 with reasonable relief  He is supposed to start formal physical therapy next week for this issue        Allergies   Allergen Reactions    Lisinopril Rash        Current Outpatient Medications   Medication Sig Dispense Refill    varenicline (CHANTIX) 0.5 MG tablet TAKE 0.5 MG TABLET DAILY FOR 3 DAYS THEN 0.5 MG TWICE A DAY FOR 4 DAYS THEN TAKE 1 MG BY MOUTH TWO TIMES A DAY 56 tablet 3    metFORMIN (GLUCOPHAGE) 500 MG tablet TAKE 1 TABLET BY MOUTH 2 TIMES DAILY (WITH MEALS) 60 tablet 5    losartan (COZAAR) 100 MG tablet TAKE ONE TABLET BY MOUTH EVERY DAY 90 tablet 1    albuterol sulfate HFA (VENTOLIN HFA) 108 (90 Base) MCG/ACT inhaler Inhale 2 puffs into the lungs 4 times daily as needed for Wheezing 18 g 5    vitamin B-12 (CYANOCOBALAMIN) 1000 MCG tablet Take 1 tablet by mouth daily      aspirin 81 MG EC

## 2025-04-24 ENCOUNTER — OFFICE VISIT (OUTPATIENT)
Dept: PULMONOLOGY | Age: 65
End: 2025-04-24
Payer: COMMERCIAL

## 2025-04-24 VITALS
HEIGHT: 63 IN | HEART RATE: 90 BPM | BODY MASS INDEX: 30.69 KG/M2 | OXYGEN SATURATION: 97 % | SYSTOLIC BLOOD PRESSURE: 128 MMHG | WEIGHT: 173.2 LBS | DIASTOLIC BLOOD PRESSURE: 82 MMHG

## 2025-04-24 DIAGNOSIS — J44.9 CHRONIC OBSTRUCTIVE PULMONARY DISEASE, UNSPECIFIED COPD TYPE (HCC): Primary | ICD-10-CM

## 2025-04-24 DIAGNOSIS — R91.8 MULTIPLE LUNG NODULES ON CT: ICD-10-CM

## 2025-04-24 PROCEDURE — 3074F SYST BP LT 130 MM HG: CPT | Performed by: INTERNAL MEDICINE

## 2025-04-24 PROCEDURE — G2211 COMPLEX E/M VISIT ADD ON: HCPCS | Performed by: INTERNAL MEDICINE

## 2025-04-24 PROCEDURE — 3079F DIAST BP 80-89 MM HG: CPT | Performed by: INTERNAL MEDICINE

## 2025-04-24 PROCEDURE — 3023F SPIROM DOC REV: CPT | Performed by: INTERNAL MEDICINE

## 2025-04-24 PROCEDURE — 3017F COLORECTAL CA SCREEN DOC REV: CPT | Performed by: INTERNAL MEDICINE

## 2025-04-24 PROCEDURE — G8417 CALC BMI ABV UP PARAM F/U: HCPCS | Performed by: INTERNAL MEDICINE

## 2025-04-24 PROCEDURE — 1123F ACP DISCUSS/DSCN MKR DOCD: CPT | Performed by: INTERNAL MEDICINE

## 2025-04-24 PROCEDURE — 4004F PT TOBACCO SCREEN RCVD TLK: CPT | Performed by: INTERNAL MEDICINE

## 2025-04-24 PROCEDURE — G8427 DOCREV CUR MEDS BY ELIG CLIN: HCPCS | Performed by: INTERNAL MEDICINE

## 2025-04-24 PROCEDURE — 99214 OFFICE O/P EST MOD 30 MIN: CPT | Performed by: INTERNAL MEDICINE

## 2025-04-24 ASSESSMENT — ENCOUNTER SYMPTOMS
COLOR CHANGE: 0
SORE THROAT: 0
WHEEZING: 0
STRIDOR: 0
SINUS PRESSURE: 0
APNEA: 0
CONSTIPATION: 0
CHEST TIGHTNESS: 0
VOMITING: 0
ABDOMINAL DISTENTION: 0
VOICE CHANGE: 0
CHOKING: 0
BACK PAIN: 0
DIARRHEA: 0
BLOOD IN STOOL: 0
SHORTNESS OF BREATH: 1
RHINORRHEA: 0
ABDOMINAL PAIN: 0
COUGH: 1

## 2025-04-24 NOTE — PROGRESS NOTES
Jose Alejandro Mesa    YOB: 1960     Date of Service:  4/24/2025     Chief Complaint   Patient presents with    3 Month Follow-Up       HPI patient accompanied by his daughter to office who provided me with all of the history.  Continues to have some cough and shortness of breath-using albuterol inhaler twice daily per report.  Continues to smoke.    Allergies   Allergen Reactions    Lisinopril Rash     Outpatient Medications Marked as Taking for the 4/24/25 encounter (Office Visit) with Samir Abad MD   Medication Sig Dispense Refill    metFORMIN (GLUCOPHAGE) 500 MG tablet TAKE 1 TABLET BY MOUTH 2 TIMES DAILY (WITH MEALS) 60 tablet 5    losartan (COZAAR) 100 MG tablet TAKE ONE TABLET BY MOUTH EVERY DAY 90 tablet 1    albuterol sulfate HFA (VENTOLIN HFA) 108 (90 Base) MCG/ACT inhaler Inhale 2 puffs into the lungs 4 times daily as needed for Wheezing 18 g 5    vitamin B-12 (CYANOCOBALAMIN) 1000 MCG tablet Take 1 tablet by mouth daily      aspirin 81 MG EC tablet Take 1 tablet by mouth daily      famotidine (PEPCID) 20 MG tablet TAKE 1 TABLET BY MOUTH 2 TIMES DAILY 60 tablet 11    cetirizine (ZYRTEC) 10 MG tablet TAKE ONE TABLET BY MOUTH EVERY DAY 30 tablet 11    hydroCHLOROthiazide (HYDRODIURIL) 25 MG tablet Take 1 tablet by mouth daily 90 tablet 3    gabapentin (NEURONTIN) 300 MG capsule Take 1 capsule by mouth 3 times daily for 360 days. 270 capsule 3    atorvastatin (LIPITOR) 80 MG tablet Take 1 tablet by mouth at bedtime 90 tablet 3    tamsulosin (FLOMAX) 0.4 MG capsule Take 1 capsule by mouth at bedtime 90 capsule 3    Cholecalciferol 50 MCG (2000 UT) TABS Take 1 tablet by mouth daily (with breakfast) 90 tablet 3    celecoxib (CELEBREX) 200 MG capsule Take 1 capsule by mouth 2 times daily 60 capsule 3    sucralfate (CARAFATE) 1 GM tablet Take 1 tablet by mouth 4 times daily 120 tablet 0    acetaminophen (TYLENOL) 500 MG tablet Take 2 tablets by mouth 3 times daily as needed for Pain

## 2025-05-13 NOTE — PLAN OF CARE
Hearing Aid Visit:    Name:  Bianka Boyle  :  1955  Age:  69 y.o.  MRN:  [id number]  Date of Evaluation: 25      HISTORY:    Bianka Boyle was seen today (2025) for a(n) in-warranty hearing aid check of her bilateral hearing aids. Today, Bianka was seen to  new earmolds.    Most recent Audiogram: 25    DEVICE INFORMATION:       Left Device Right Device   Hearing Aid Make: [employer]  [employer]    Hearing Aid Model: REAL 1 Mini RITE-R REAL 1 Mini RITE-R   Serial Number: [id number]   Repair Warranty Date: 5/15/28 5/15/28   Loss/Damage Warranty Status: Active  Active         Length/Output    Wax System: Pro Wax Pro Wax   Dome Size/Style: - -   Battery: Lithium-ion Rechargeable Lithium-ion Rechargeable       Earmold Serial Number: [id number]   Earmold Warranty Date:  28    Serial Number:  [id number]    Warranty Date:  5/15/28     Accessories: N/A       ACTION/ADJUSTMENTS:    Patient was happy with fit and sound quality.     RECOMMENDATIONS:     Follow up in 6 months or PRN.      Lindsay Diehl, CCC-A  Clinical Audiologist  [facility]  [facility] [place] [phone]  
Deficits.  [] Progressing: [] Met: [] Not Met: [] Adjusted    Long Term Goals: To be achieved in: 6-8 weeks  1. Disability index score of 20% or less for the Quick DASH, Modified Oswestry, and Neck Disability Index to assist with reaching prior level of function with activities such as household chores, ADLs.  [] Progressing: [] Met: [] Not Met: [] Adjusted  2. Patient will demonstrate increased AROM of L shoulder in all planes to symmetrical to that of the R without pain to allow for proper joint functioning to enable patient to be more independent with ADLs.   [] Progressing: [] Met: [] Not Met: [] Adjusted  4. Patient will demonstrate improved postural awareness to aid in long term management of symptoms.  [] Progressing: [] Met: [] Not Met: [] Adjusted  5. Improve lumbar and cervical ROM by at least 5-10 degrees in all planes to indicate improved mobility and tissue extensibility lending to less pain with ADLs.   [] Progressing: [] Met: [] Not Met: [] Adjusted     Overall Progression Towards Functional goals/ Treatment Progress Update:  [] Patient is progressing as expected towards functional goals listed.    [] Progression is slowed due to complexities/Impairments listed.  [] Progression has been slowed due to co-morbidities.  [x] Plan just implemented, too soon (<30days) to assess goals progression   [] Goals require adjustment due to lack of progress  [] Patient is not progressing as expected and requires additional follow up with physician  [] Other:     TREATMENT PLAN     Frequency/Duration: 2x/week for 4-6 weeks for the following treatment interventions:    Interventions:  Therapeutic Exercise (43236) including: strength training, ROM, and functional mobility  Therapeutic Activities (75727) including: functional mobility training and education.  Neuromuscular Re-education (67461) activation and proprioception, including postural re-education.    Gait Training (98336) for normalization of ambulation

## 2025-06-03 DIAGNOSIS — M25.512 CHRONIC LEFT SHOULDER PAIN: ICD-10-CM

## 2025-06-03 DIAGNOSIS — G89.29 CHRONIC PAIN OF LEFT KNEE: ICD-10-CM

## 2025-06-03 DIAGNOSIS — M25.562 CHRONIC PAIN OF LEFT KNEE: ICD-10-CM

## 2025-06-03 DIAGNOSIS — M51.369 DDD (DEGENERATIVE DISC DISEASE), LUMBAR: ICD-10-CM

## 2025-06-03 DIAGNOSIS — G89.29 CHRONIC LEFT SHOULDER PAIN: ICD-10-CM

## 2025-06-03 RX ORDER — CELECOXIB 200 MG/1
200 CAPSULE ORAL 2 TIMES DAILY
Qty: 60 CAPSULE | Refills: 2 | Status: SHIPPED | OUTPATIENT
Start: 2025-06-03

## 2025-07-03 ENCOUNTER — TELEPHONE (OUTPATIENT)
Dept: PRIMARY CARE CLINIC | Age: 65
End: 2025-07-03

## 2025-07-03 NOTE — TELEPHONE ENCOUNTER
----- Message from Dr. Chris Pena MD sent at 7/3/2025  1:07 PM EDT -----  Regarding: f/u  Please ask patient to get his fasting lab work and schedule him for  follow-up visit

## 2025-07-08 ENCOUNTER — TELEPHONE (OUTPATIENT)
Dept: PRIMARY CARE CLINIC | Age: 65
End: 2025-07-08

## 2025-07-08 DIAGNOSIS — E11.9 TYPE 2 DIABETES MELLITUS WITHOUT COMPLICATION, WITHOUT LONG-TERM CURRENT USE OF INSULIN (HCC): ICD-10-CM

## 2025-07-08 DIAGNOSIS — E55.9 VITAMIN D DEFICIENCY: ICD-10-CM

## 2025-07-08 DIAGNOSIS — E78.49 OTHER HYPERLIPIDEMIA: ICD-10-CM

## 2025-07-08 DIAGNOSIS — R53.83 OTHER FATIGUE: ICD-10-CM

## 2025-07-08 DIAGNOSIS — Z12.5 SCREENING PSA (PROSTATE SPECIFIC ANTIGEN): ICD-10-CM

## 2025-07-08 DIAGNOSIS — Z00.00 ENCOUNTER FOR WELL ADULT EXAM WITHOUT ABNORMAL FINDINGS: ICD-10-CM

## 2025-07-08 NOTE — TELEPHONE ENCOUNTER
Daughter of Man called and was checking to see if  Dr. Pena fillrd out forms for [facility] for her dad. He has an appointment tomorrow.

## 2025-07-09 ENCOUNTER — OFFICE VISIT (OUTPATIENT)
Dept: PRIMARY CARE CLINIC | Age: 65
End: 2025-07-09
Payer: COMMERCIAL

## 2025-07-09 VITALS
HEART RATE: 83 BPM | WEIGHT: 167.8 LBS | SYSTOLIC BLOOD PRESSURE: 100 MMHG | HEIGHT: 63 IN | DIASTOLIC BLOOD PRESSURE: 64 MMHG | BODY MASS INDEX: 29.73 KG/M2 | OXYGEN SATURATION: 95 %

## 2025-07-09 DIAGNOSIS — M25.562 CHRONIC PAIN OF LEFT KNEE: ICD-10-CM

## 2025-07-09 DIAGNOSIS — G89.29 CHRONIC PAIN OF LEFT KNEE: ICD-10-CM

## 2025-07-09 DIAGNOSIS — F17.210 CIGARETTE SMOKER: ICD-10-CM

## 2025-07-09 DIAGNOSIS — I10 PRIMARY HYPERTENSION: ICD-10-CM

## 2025-07-09 DIAGNOSIS — N50.811 PAIN IN RIGHT TESTICLE: ICD-10-CM

## 2025-07-09 DIAGNOSIS — N18.32 CKD STAGE 3B, GFR 30-44 ML/MIN (HCC): ICD-10-CM

## 2025-07-09 DIAGNOSIS — E11.9 TYPE 2 DIABETES MELLITUS WITHOUT COMPLICATION, WITHOUT LONG-TERM CURRENT USE OF INSULIN (HCC): Primary | ICD-10-CM

## 2025-07-09 DIAGNOSIS — E11.42 DIABETIC POLYNEUROPATHY ASSOCIATED WITH TYPE 2 DIABETES MELLITUS (HCC): ICD-10-CM

## 2025-07-09 DIAGNOSIS — E78.49 OTHER HYPERLIPIDEMIA: ICD-10-CM

## 2025-07-09 DIAGNOSIS — E78.1 HIGH TRIGLYCERIDES: ICD-10-CM

## 2025-07-09 LAB
25(OH)D3 SERPL-MCNC: 45.1 NG/ML
ALBUMIN SERPL-MCNC: 4.2 G/DL (ref 3.4–5)
ALBUMIN/GLOB SERPL: 1.5 {RATIO} (ref 1.1–2.2)
ALP SERPL-CCNC: 176 U/L (ref 40–129)
ALT SERPL-CCNC: 32 U/L (ref 10–40)
ANION GAP SERPL CALCULATED.3IONS-SCNC: 12 MMOL/L (ref 3–16)
AST SERPL-CCNC: 34 U/L (ref 15–37)
BASOPHILS # BLD: 0.1 K/UL (ref 0–0.2)
BASOPHILS NFR BLD: 1.2 %
BILIRUB SERPL-MCNC: 0.3 MG/DL (ref 0–1)
BUN SERPL-MCNC: 23 MG/DL (ref 7–20)
CALCIUM SERPL-MCNC: 9.1 MG/DL (ref 8.3–10.6)
CHLORIDE SERPL-SCNC: 105 MMOL/L (ref 99–110)
CHOLEST SERPL-MCNC: 115 MG/DL (ref 0–199)
CO2 SERPL-SCNC: 20 MMOL/L (ref 21–32)
CREAT SERPL-MCNC: 2 MG/DL (ref 0.8–1.3)
CREAT UR-MCNC: 46.9 MG/DL (ref 39–259)
DEPRECATED RDW RBC AUTO: 14.3 % (ref 12.4–15.4)
EOSINOPHIL # BLD: 0.5 K/UL (ref 0–0.6)
EOSINOPHIL NFR BLD: 7.5 %
EST. AVERAGE GLUCOSE BLD GHB EST-MCNC: 162.8 MG/DL
GFR SERPLBLD CREATININE-BSD FMLA CKD-EPI: 36 ML/MIN/{1.73_M2}
GLUCOSE SERPL-MCNC: 174 MG/DL (ref 70–99)
HBA1C MFR BLD: 7.3 %
HCT VFR BLD AUTO: 36.6 % (ref 40.5–52.5)
HDLC SERPL-MCNC: 28 MG/DL (ref 40–60)
HGB BLD-MCNC: 12.2 G/DL (ref 13.5–17.5)
LDLC SERPL CALC-MCNC: 31 MG/DL
LYMPHOCYTES # BLD: 1.7 K/UL (ref 1–5.1)
LYMPHOCYTES NFR BLD: 23.2 %
MCH RBC QN AUTO: 29.8 PG (ref 26–34)
MCHC RBC AUTO-ENTMCNC: 33.4 G/DL (ref 31–36)
MCV RBC AUTO: 89.2 FL (ref 80–100)
MICROALBUMIN UR DL<=1MG/L-MCNC: <1.2 MG/DL
MICROALBUMIN/CREAT UR: NORMAL MG/G (ref 0–30)
MONOCYTES # BLD: 0.6 K/UL (ref 0–1.3)
MONOCYTES NFR BLD: 8.6 %
NEUTROPHILS # BLD: 4.3 K/UL (ref 1.7–7.7)
NEUTROPHILS NFR BLD: 59.5 %
PLATELET # BLD AUTO: 234 K/UL (ref 135–450)
PMV BLD AUTO: 9.9 FL (ref 5–10.5)
POTASSIUM SERPL-SCNC: 4.2 MMOL/L (ref 3.5–5.1)
PROT SERPL-MCNC: 7 G/DL (ref 6.4–8.2)
PSA SERPL DL<=0.01 NG/ML-MCNC: 1.74 NG/ML (ref 0–4)
RBC # BLD AUTO: 4.1 M/UL (ref 4.2–5.9)
SODIUM SERPL-SCNC: 137 MMOL/L (ref 136–145)
T4 FREE SERPL-MCNC: 1 NG/DL (ref 0.9–1.8)
TRIGL SERPL-MCNC: 281 MG/DL (ref 0–150)
TSH SERPL DL<=0.005 MIU/L-ACNC: 4.46 UIU/ML (ref 0.27–4.2)
VIT B12 SERPL-MCNC: 449 PG/ML (ref 211–911)
VLDLC SERPL CALC-MCNC: 56 MG/DL
WBC # BLD AUTO: 7.2 K/UL (ref 4–11)

## 2025-07-09 PROCEDURE — G8417 CALC BMI ABV UP PARAM F/U: HCPCS | Performed by: INTERNAL MEDICINE

## 2025-07-09 PROCEDURE — G8427 DOCREV CUR MEDS BY ELIG CLIN: HCPCS | Performed by: INTERNAL MEDICINE

## 2025-07-09 PROCEDURE — 99214 OFFICE O/P EST MOD 30 MIN: CPT | Performed by: INTERNAL MEDICINE

## 2025-07-09 PROCEDURE — 3051F HG A1C>EQUAL 7.0%<8.0%: CPT | Performed by: INTERNAL MEDICINE

## 2025-07-09 PROCEDURE — 3017F COLORECTAL CA SCREEN DOC REV: CPT | Performed by: INTERNAL MEDICINE

## 2025-07-09 PROCEDURE — 2022F DILAT RTA XM EVC RTNOPTHY: CPT | Performed by: INTERNAL MEDICINE

## 2025-07-09 PROCEDURE — 3074F SYST BP LT 130 MM HG: CPT | Performed by: INTERNAL MEDICINE

## 2025-07-09 PROCEDURE — 4004F PT TOBACCO SCREEN RCVD TLK: CPT | Performed by: INTERNAL MEDICINE

## 2025-07-09 PROCEDURE — 1123F ACP DISCUSS/DSCN MKR DOCD: CPT | Performed by: INTERNAL MEDICINE

## 2025-07-09 PROCEDURE — 3078F DIAST BP <80 MM HG: CPT | Performed by: INTERNAL MEDICINE

## 2025-07-09 RX ORDER — OMEGA-3 FATTY ACIDS/FISH OIL 300-1000MG
1000 CAPSULE ORAL
Qty: 90 CAPSULE | Refills: 3 | Status: ON HOLD | OUTPATIENT
Start: 2025-07-09

## 2025-07-09 RX ORDER — GLIPIZIDE 5 MG/1
5 TABLET, FILM COATED, EXTENDED RELEASE ORAL
Qty: 30 TABLET | Refills: 5 | Status: ON HOLD | OUTPATIENT
Start: 2025-07-09

## 2025-07-09 SDOH — ECONOMIC STABILITY: FOOD INSECURITY: WITHIN THE PAST 12 MONTHS, THE FOOD YOU BOUGHT JUST DIDN'T LAST AND YOU DIDN'T HAVE MONEY TO GET MORE.: NEVER TRUE

## 2025-07-09 SDOH — ECONOMIC STABILITY: FOOD INSECURITY: WITHIN THE PAST 12 MONTHS, YOU WORRIED THAT YOUR FOOD WOULD RUN OUT BEFORE YOU GOT MONEY TO BUY MORE.: NEVER TRUE

## 2025-07-09 ASSESSMENT — ENCOUNTER SYMPTOMS
CHEST TIGHTNESS: 0
SHORTNESS OF BREATH: 0
DIARRHEA: 0
ABDOMINAL DISTENTION: 0
TROUBLE SWALLOWING: 0
SINUS PRESSURE: 0
NAUSEA: 0
VOMITING: 0
EYE REDNESS: 0
COLOR CHANGE: 0
BACK PAIN: 0
CONSTIPATION: 0
SORE THROAT: 0
EYE PAIN: 0
WHEEZING: 0
ABDOMINAL PAIN: 0
BLOOD IN STOOL: 0
COUGH: 0

## 2025-07-09 ASSESSMENT — PATIENT HEALTH QUESTIONNAIRE - PHQ9
7. TROUBLE CONCENTRATING ON THINGS, SUCH AS READING THE NEWSPAPER OR WATCHING TELEVISION: SEVERAL DAYS
SUM OF ALL RESPONSES TO PHQ QUESTIONS 1-9: 17
SUM OF ALL RESPONSES TO PHQ QUESTIONS 1-9: 17
10. IF YOU CHECKED OFF ANY PROBLEMS, HOW DIFFICULT HAVE THESE PROBLEMS MADE IT FOR YOU TO DO YOUR WORK, TAKE CARE OF THINGS AT HOME, OR GET ALONG WITH OTHER PEOPLE: VERY DIFFICULT
8. MOVING OR SPEAKING SO SLOWLY THAT OTHER PEOPLE COULD HAVE NOTICED. OR THE OPPOSITE, BEING SO FIGETY OR RESTLESS THAT YOU HAVE BEEN MOVING AROUND A LOT MORE THAN USUAL: SEVERAL DAYS
3. TROUBLE FALLING OR STAYING ASLEEP: MORE THAN HALF THE DAYS
2. FEELING DOWN, DEPRESSED OR HOPELESS: SEVERAL DAYS
5. POOR APPETITE OR OVEREATING: NEARLY EVERY DAY
SUM OF ALL RESPONSES TO PHQ QUESTIONS 1-9: 14
SUM OF ALL RESPONSES TO PHQ QUESTIONS 1-9: 17
4. FEELING TIRED OR HAVING LITTLE ENERGY: NEARLY EVERY DAY
1. LITTLE INTEREST OR PLEASURE IN DOING THINGS: MORE THAN HALF THE DAYS
9. THOUGHTS THAT YOU WOULD BE BETTER OFF DEAD, OR OF HURTING YOURSELF: NEARLY EVERY DAY
6. FEELING BAD ABOUT YOURSELF - OR THAT YOU ARE A FAILURE OR HAVE LET YOURSELF OR YOUR FAMILY DOWN: SEVERAL DAYS

## 2025-07-09 NOTE — ASSESSMENT & PLAN NOTE
Patient will should avoid nephrotoxic drugs and supplements, adhere to low-salt diet.   Maintain blood pressure within normal range for age.  Referred to nephrologist

## 2025-07-09 NOTE — CONSULTS
Session ID: [id number]  Session Duration: Longer than 54 minutes  Language: Earl   ID: #[id number]   Name: Maritaage: Earl   ID: #[id number]   Name: Jagjit

## 2025-07-09 NOTE — PROGRESS NOTES
recognition software.  Voice recognition is usually quite accurate but there are transcription errors that can often occur.  All attempts were made to correct these errors I apologized for any  typographical errors that were not detected and corrected.     Electronically signed by Chris Pena MD on 7/9/2025 at 12:42 PM.

## 2025-07-09 NOTE — ASSESSMENT & PLAN NOTE
A1C:   Lab Results   Component Value Date/Time    LABA1C 7.3 07/08/2025 10:46 AM    LABA1C 6.6 06/17/2024 10:31 AM    LABA1C 7.0 09/21/2022 05:21 AM     Stable- continue home blood sugar monitoring  Counselled on Diet and exercise with goal to achieve appropriate BMI  Patient has had some decline in his GFR.  Will DC metformin, prescribe Glucotrol XL 5 mg to take with food.  Biggest meal is at night so he will take it with dinner  Continue home foot care  Patient agreed with plan with verbal understanding

## 2025-07-10 ENCOUNTER — TELEPHONE (OUTPATIENT)
Dept: PRIMARY CARE CLINIC | Age: 65
End: 2025-07-10

## 2025-07-10 NOTE — TELEPHONE ENCOUNTER
Medication  Eastman 3 1000 MG CAPS [[id number]]  Omega 3 1000 MG CAPS [[id number]]    Order Details  Dose: 1,000 mg Route: Oral Frequency: DAILY WITH DINNER   Dispense Quantity: 90 capsule Refills: 3          Sig: Take 1,000 mg by mouth Daily with supper         Start Date: 07/09/25 End Date: --   Written Date: 07/09/25 Expiration Date: 07/09/26       Associated Diagnoses: High triglycerides [E78.1]   Providers    Authorizing Provider: Chris Pena MD NPI: [id number]   Ordering User: Chris Pena MD

## 2025-07-11 ENCOUNTER — TELEPHONE (OUTPATIENT)
Dept: PRIMARY CARE CLINIC | Age: 65
End: 2025-07-11

## 2025-07-11 NOTE — TELEPHONE ENCOUNTER
Omega-3 1000 mg capsules    [facility] [address]    [place]: [id number]

## 2025-07-13 ENCOUNTER — APPOINTMENT (OUTPATIENT)
Dept: GENERAL RADIOLOGY | Age: 65
DRG: 720 | End: 2025-07-13
Payer: MEDICAID

## 2025-07-13 ENCOUNTER — HOSPITAL ENCOUNTER (INPATIENT)
Age: 65
LOS: 3 days | Discharge: HOME | DRG: 720 | End: 2025-07-16
Attending: INTERNAL MEDICINE | Admitting: INTERNAL MEDICINE
Payer: MEDICAID

## 2025-07-13 ENCOUNTER — APPOINTMENT (OUTPATIENT)
Dept: ULTRASOUND IMAGING | Age: 65
DRG: 720 | End: 2025-07-13
Payer: MEDICAID

## 2025-07-13 ENCOUNTER — APPOINTMENT (OUTPATIENT)
Dept: CT IMAGING | Age: 65
DRG: 720 | End: 2025-07-13
Payer: MEDICAID

## 2025-07-13 DIAGNOSIS — N10 ACUTE PYELONEPHRITIS: ICD-10-CM

## 2025-07-13 DIAGNOSIS — A41.9 SEPSIS, DUE TO UNSPECIFIED ORGANISM, UNSPECIFIED WHETHER ACUTE ORGAN DYSFUNCTION PRESENT (HCC): ICD-10-CM

## 2025-07-13 DIAGNOSIS — N45.3 EPIDIDYMOORCHITIS: Primary | ICD-10-CM

## 2025-07-13 LAB
ALBUMIN SERPL-MCNC: 4.4 G/DL (ref 3.4–5)
ALBUMIN/GLOB SERPL: 1.1 {RATIO} (ref 1.1–2.2)
ALP SERPL-CCNC: 174 U/L (ref 40–129)
ALT SERPL-CCNC: 32 U/L (ref 10–40)
ANION GAP SERPL CALCULATED.3IONS-SCNC: 15 MMOL/L (ref 3–16)
AST SERPL-CCNC: 35 U/L (ref 15–37)
BACTERIA URNS QL MICRO: ABNORMAL /HPF
BASE EXCESS BLDV CALC-SCNC: -5.8 MMOL/L (ref -3–3)
BASOPHILS # BLD: 0.1 K/UL (ref 0–0.2)
BASOPHILS NFR BLD: 0.5 %
BILIRUB SERPL-MCNC: 1 MG/DL (ref 0–1)
BILIRUB UR QL STRIP.AUTO: NEGATIVE
BUN SERPL-MCNC: 22 MG/DL (ref 7–20)
CALCIUM SERPL-MCNC: 9.5 MG/DL (ref 8.3–10.6)
CHLORIDE SERPL-SCNC: 103 MMOL/L (ref 99–110)
CLARITY UR: CLEAR
CO2 BLDV-SCNC: 42 MMOL/L
CO2 SERPL-SCNC: 15 MMOL/L (ref 21–32)
COHGB MFR BLDV: 2.4 % (ref 0–1.5)
COLOR UR: YELLOW
CREAT SERPL-MCNC: 1.8 MG/DL (ref 0.8–1.3)
DEPRECATED RDW RBC AUTO: 13.9 % (ref 12.4–15.4)
EOSINOPHIL # BLD: 0.1 K/UL (ref 0–0.6)
EOSINOPHIL NFR BLD: 0.7 %
EPI CELLS #/AREA URNS AUTO: 0 /HPF (ref 0–5)
FLUAV RNA RESP QL NAA+PROBE: NOT DETECTED
FLUBV RNA RESP QL NAA+PROBE: NOT DETECTED
GFR SERPLBLD CREATININE-BSD FMLA CKD-EPI: 41 ML/MIN/{1.73_M2}
GLUCOSE BLD-MCNC: 176 MG/DL (ref 70–99)
GLUCOSE SERPL-MCNC: 277 MG/DL (ref 70–99)
GLUCOSE UR STRIP.AUTO-MCNC: NEGATIVE MG/DL
HCO3 BLDV-SCNC: 17.9 MMOL/L (ref 23–29)
HCT VFR BLD AUTO: 38.7 % (ref 40.5–52.5)
HGB BLD-MCNC: 13.2 G/DL (ref 13.5–17.5)
HGB UR QL STRIP.AUTO: NEGATIVE
HYALINE CASTS #/AREA URNS AUTO: 0 /LPF (ref 0–8)
INR PPP: 1.01 (ref 0.86–1.14)
KETONES UR STRIP.AUTO-MCNC: NEGATIVE MG/DL
LACTATE BLDV-SCNC: 1.9 MMOL/L (ref 0.4–1.9)
LEUKOCYTE ESTERASE UR QL STRIP.AUTO: ABNORMAL
LYMPHOCYTES # BLD: 1.2 K/UL (ref 1–5.1)
LYMPHOCYTES NFR BLD: 8.3 %
MAGNESIUM SERPL-MCNC: 1.81 MG/DL (ref 1.8–2.4)
MCH RBC QN AUTO: 29.9 PG (ref 26–34)
MCHC RBC AUTO-ENTMCNC: 34.2 G/DL (ref 31–36)
MCV RBC AUTO: 87.3 FL (ref 80–100)
METHGB MFR BLDV: 0.2 %
MONOCYTES # BLD: 0.9 K/UL (ref 0–1.3)
MONOCYTES NFR BLD: 6.5 %
NEUTROPHILS # BLD: 11.8 K/UL (ref 1.7–7.7)
NEUTROPHILS NFR BLD: 84 %
NITRITE UR QL STRIP.AUTO: NEGATIVE
O2 CT VFR BLDV CALC: 18 VOL %
O2 THERAPY: ABNORMAL
PCO2 BLDV: 29.7 MMHG (ref 40–50)
PERFORMED ON: ABNORMAL
PH BLDV: 7.39 [PH] (ref 7.35–7.45)
PH UR STRIP.AUTO: 5.5 [PH] (ref 5–8)
PLATELET # BLD AUTO: 247 K/UL (ref 135–450)
PMV BLD AUTO: 9.5 FL (ref 5–10.5)
PO2 BLDV: 176 MMHG (ref 25–40)
POTASSIUM SERPL-SCNC: 4 MMOL/L (ref 3.5–5.1)
PROCALCITONIN SERPL IA-MCNC: 0.29 NG/ML (ref 0–0.15)
PROT SERPL-MCNC: 8.3 G/DL (ref 6.4–8.2)
PROT UR STRIP.AUTO-MCNC: NEGATIVE MG/DL
PROTHROMBIN TIME: 13.6 SEC (ref 12.1–14.9)
RBC # BLD AUTO: 4.43 M/UL (ref 4.2–5.9)
RBC CLUMPS #/AREA URNS AUTO: 0 /HPF (ref 0–4)
SAO2 % BLDV: 100 %
SARS-COV-2 RNA RESP QL NAA+PROBE: NOT DETECTED
SODIUM SERPL-SCNC: 133 MMOL/L (ref 136–145)
SP GR UR STRIP.AUTO: 1.01 (ref 1–1.03)
UA COMPLETE W REFLEX CULTURE PNL UR: ABNORMAL
UA DIPSTICK W REFLEX MICRO PNL UR: YES
URN SPEC COLLECT METH UR: ABNORMAL
UROBILINOGEN UR STRIP-ACNC: 0.2 E.U./DL
WBC # BLD AUTO: 14 K/UL (ref 4–11)
WBC #/AREA URNS AUTO: 5 /HPF (ref 0–5)

## 2025-07-13 PROCEDURE — 93975 VASCULAR STUDY: CPT

## 2025-07-13 PROCEDURE — 6360000002 HC RX W HCPCS: Performed by: PHYSICIAN ASSISTANT

## 2025-07-13 PROCEDURE — 87040 BLOOD CULTURE FOR BACTERIA: CPT

## 2025-07-13 PROCEDURE — 84145 PROCALCITONIN (PCT): CPT

## 2025-07-13 PROCEDURE — 2580000003 HC RX 258: Performed by: INTERNAL MEDICINE

## 2025-07-13 PROCEDURE — 99285 EMERGENCY DEPT VISIT HI MDM: CPT

## 2025-07-13 PROCEDURE — 83605 ASSAY OF LACTIC ACID: CPT

## 2025-07-13 PROCEDURE — 85025 COMPLETE CBC W/AUTO DIFF WBC: CPT

## 2025-07-13 PROCEDURE — 81001 URINALYSIS AUTO W/SCOPE: CPT

## 2025-07-13 PROCEDURE — 2500000003 HC RX 250 WO HCPCS: Performed by: INTERNAL MEDICINE

## 2025-07-13 PROCEDURE — 1200000000 HC SEMI PRIVATE

## 2025-07-13 PROCEDURE — 85610 PROTHROMBIN TIME: CPT

## 2025-07-13 PROCEDURE — 87077 CULTURE AEROBIC IDENTIFY: CPT

## 2025-07-13 PROCEDURE — 83735 ASSAY OF MAGNESIUM: CPT

## 2025-07-13 PROCEDURE — 82803 BLOOD GASES ANY COMBINATION: CPT

## 2025-07-13 PROCEDURE — 87186 SC STD MICRODIL/AGAR DIL: CPT

## 2025-07-13 PROCEDURE — 2580000003 HC RX 258: Performed by: PHYSICIAN ASSISTANT

## 2025-07-13 PROCEDURE — 87636 SARSCOV2 & INF A&B AMP PRB: CPT

## 2025-07-13 PROCEDURE — 93005 ELECTROCARDIOGRAM TRACING: CPT | Performed by: INTERNAL MEDICINE

## 2025-07-13 PROCEDURE — 6370000000 HC RX 637 (ALT 250 FOR IP): Performed by: INTERNAL MEDICINE

## 2025-07-13 PROCEDURE — 96361 HYDRATE IV INFUSION ADD-ON: CPT

## 2025-07-13 PROCEDURE — 87086 URINE CULTURE/COLONY COUNT: CPT

## 2025-07-13 PROCEDURE — 80053 COMPREHEN METABOLIC PANEL: CPT

## 2025-07-13 PROCEDURE — 71045 X-RAY EXAM CHEST 1 VIEW: CPT

## 2025-07-13 PROCEDURE — 6370000000 HC RX 637 (ALT 250 FOR IP): Performed by: PHYSICIAN ASSISTANT

## 2025-07-13 PROCEDURE — 96365 THER/PROPH/DIAG IV INF INIT: CPT

## 2025-07-13 PROCEDURE — 76870 US EXAM SCROTUM: CPT

## 2025-07-13 PROCEDURE — 74176 CT ABD & PELVIS W/O CONTRAST: CPT

## 2025-07-13 RX ORDER — INSULIN LISPRO 100 [IU]/ML
0-8 INJECTION, SOLUTION INTRAVENOUS; SUBCUTANEOUS
Status: DISCONTINUED | OUTPATIENT
Start: 2025-07-13 | End: 2025-07-16 | Stop reason: HOSPADM

## 2025-07-13 RX ORDER — FAMOTIDINE 20 MG/1
20 TABLET, FILM COATED ORAL 2 TIMES DAILY
Status: DISCONTINUED | OUTPATIENT
Start: 2025-07-13 | End: 2025-07-16 | Stop reason: HOSPADM

## 2025-07-13 RX ORDER — 0.9 % SODIUM CHLORIDE 0.9 %
500 INTRAVENOUS SOLUTION INTRAVENOUS ONCE
Status: COMPLETED | OUTPATIENT
Start: 2025-07-13 | End: 2025-07-13

## 2025-07-13 RX ORDER — ASPIRIN 81 MG/1
81 TABLET ORAL DAILY
Status: DISCONTINUED | OUTPATIENT
Start: 2025-07-13 | End: 2025-07-16 | Stop reason: HOSPADM

## 2025-07-13 RX ORDER — ACETAMINOPHEN 650 MG/1
650 SUPPOSITORY RECTAL EVERY 6 HOURS PRN
Status: DISCONTINUED | OUTPATIENT
Start: 2025-07-13 | End: 2025-07-16 | Stop reason: HOSPADM

## 2025-07-13 RX ORDER — LEVOFLOXACIN 5 MG/ML
750 INJECTION, SOLUTION INTRAVENOUS ONCE
Status: COMPLETED | OUTPATIENT
Start: 2025-07-13 | End: 2025-07-13

## 2025-07-13 RX ORDER — VITAMIN B COMPLEX
2000 TABLET ORAL
Status: DISCONTINUED | OUTPATIENT
Start: 2025-07-14 | End: 2025-07-16 | Stop reason: HOSPADM

## 2025-07-13 RX ORDER — ATORVASTATIN CALCIUM 80 MG/1
80 TABLET, FILM COATED ORAL NIGHTLY
Status: DISCONTINUED | OUTPATIENT
Start: 2025-07-13 | End: 2025-07-16 | Stop reason: HOSPADM

## 2025-07-13 RX ORDER — HYDROCHLOROTHIAZIDE 25 MG/1
25 TABLET ORAL DAILY
Status: DISCONTINUED | OUTPATIENT
Start: 2025-07-14 | End: 2025-07-15

## 2025-07-13 RX ORDER — SODIUM CHLORIDE, SODIUM LACTATE, POTASSIUM CHLORIDE, CALCIUM CHLORIDE 600; 310; 30; 20 MG/100ML; MG/100ML; MG/100ML; MG/100ML
INJECTION, SOLUTION INTRAVENOUS CONTINUOUS
Status: DISCONTINUED | OUTPATIENT
Start: 2025-07-13 | End: 2025-07-16 | Stop reason: HOSPADM

## 2025-07-13 RX ORDER — ALBUTEROL SULFATE 90 UG/1
2 INHALANT RESPIRATORY (INHALATION) 4 TIMES DAILY PRN
Status: DISCONTINUED | OUTPATIENT
Start: 2025-07-13 | End: 2025-07-13

## 2025-07-13 RX ORDER — ACETAMINOPHEN 325 MG/1
650 TABLET ORAL EVERY 6 HOURS PRN
Status: DISCONTINUED | OUTPATIENT
Start: 2025-07-13 | End: 2025-07-16 | Stop reason: HOSPADM

## 2025-07-13 RX ORDER — SUCRALFATE 1 G/1
1 TABLET ORAL
Status: DISCONTINUED | OUTPATIENT
Start: 2025-07-13 | End: 2025-07-16 | Stop reason: HOSPADM

## 2025-07-13 RX ORDER — TAMSULOSIN HYDROCHLORIDE 0.4 MG/1
0.4 CAPSULE ORAL NIGHTLY
Status: DISCONTINUED | OUTPATIENT
Start: 2025-07-13 | End: 2025-07-16 | Stop reason: HOSPADM

## 2025-07-13 RX ORDER — SODIUM CHLORIDE 9 MG/ML
INJECTION, SOLUTION INTRAVENOUS PRN
Status: DISCONTINUED | OUTPATIENT
Start: 2025-07-13 | End: 2025-07-16 | Stop reason: HOSPADM

## 2025-07-13 RX ORDER — SODIUM CHLORIDE 0.9 % (FLUSH) 0.9 %
5-40 SYRINGE (ML) INJECTION EVERY 12 HOURS SCHEDULED
Status: DISCONTINUED | OUTPATIENT
Start: 2025-07-13 | End: 2025-07-16 | Stop reason: HOSPADM

## 2025-07-13 RX ORDER — POLYETHYLENE GLYCOL 3350 17 G/17G
17 POWDER, FOR SOLUTION ORAL DAILY PRN
Status: DISCONTINUED | OUTPATIENT
Start: 2025-07-13 | End: 2025-07-16 | Stop reason: HOSPADM

## 2025-07-13 RX ORDER — SODIUM CHLORIDE 0.9 % (FLUSH) 0.9 %
5-40 SYRINGE (ML) INJECTION PRN
Status: DISCONTINUED | OUTPATIENT
Start: 2025-07-13 | End: 2025-07-16 | Stop reason: HOSPADM

## 2025-07-13 RX ORDER — GABAPENTIN 300 MG/1
300 CAPSULE ORAL 3 TIMES DAILY
Status: DISCONTINUED | OUTPATIENT
Start: 2025-07-13 | End: 2025-07-16 | Stop reason: HOSPADM

## 2025-07-13 RX ORDER — ENOXAPARIN SODIUM 100 MG/ML
40 INJECTION SUBCUTANEOUS DAILY
Status: DISCONTINUED | OUTPATIENT
Start: 2025-07-14 | End: 2025-07-16 | Stop reason: HOSPADM

## 2025-07-13 RX ORDER — CELECOXIB 100 MG/1
200 CAPSULE ORAL 2 TIMES DAILY
Status: DISCONTINUED | OUTPATIENT
Start: 2025-07-13 | End: 2025-07-15

## 2025-07-13 RX ORDER — MORPHINE SULFATE 2 MG/ML
2 INJECTION, SOLUTION INTRAMUSCULAR; INTRAVENOUS EVERY 4 HOURS PRN
Status: DISCONTINUED | OUTPATIENT
Start: 2025-07-13 | End: 2025-07-16 | Stop reason: HOSPADM

## 2025-07-13 RX ORDER — ACETAMINOPHEN 500 MG
1000 TABLET ORAL ONCE
Status: COMPLETED | OUTPATIENT
Start: 2025-07-13 | End: 2025-07-13

## 2025-07-13 RX ADMIN — ACETAMINOPHEN 650 MG: 325 TABLET ORAL at 18:37

## 2025-07-13 RX ADMIN — FAMOTIDINE 20 MG: 20 TABLET, FILM COATED ORAL at 21:42

## 2025-07-13 RX ADMIN — ATORVASTATIN CALCIUM 80 MG: 80 TABLET, FILM COATED ORAL at 21:42

## 2025-07-13 RX ADMIN — GABAPENTIN 300 MG: 300 CAPSULE ORAL at 21:42

## 2025-07-13 RX ADMIN — TAMSULOSIN HYDROCHLORIDE 0.4 MG: 0.4 CAPSULE ORAL at 21:42

## 2025-07-13 RX ADMIN — SUCRALFATE 1 G: 1 TABLET ORAL at 21:42

## 2025-07-13 RX ADMIN — ASPIRIN 81 MG: 81 TABLET, COATED ORAL at 21:42

## 2025-07-13 RX ADMIN — SODIUM CHLORIDE, SODIUM LACTATE, POTASSIUM CHLORIDE, AND CALCIUM CHLORIDE: .6; .31; .03; .02 INJECTION, SOLUTION INTRAVENOUS at 18:33

## 2025-07-13 RX ADMIN — SODIUM CHLORIDE, PRESERVATIVE FREE 10 ML: 5 INJECTION INTRAVENOUS at 21:43

## 2025-07-13 RX ADMIN — LEVOFLOXACIN 750 MG: 5 INJECTION, SOLUTION INTRAVENOUS at 15:36

## 2025-07-13 RX ADMIN — ACETAMINOPHEN 1000 MG: 500 TABLET ORAL at 12:41

## 2025-07-13 RX ADMIN — CELECOXIB 200 MG: 100 CAPSULE ORAL at 21:42

## 2025-07-13 RX ADMIN — SODIUM CHLORIDE 500 ML: 0.9 INJECTION, SOLUTION INTRAVENOUS at 13:14

## 2025-07-13 ASSESSMENT — PAIN SCALES - GENERAL
PAINLEVEL_OUTOF10: 10
PAINLEVEL_OUTOF10: 6
PAINLEVEL_OUTOF10: 0

## 2025-07-13 ASSESSMENT — PAIN DESCRIPTION - ORIENTATION
ORIENTATION: RIGHT
ORIENTATION: RIGHT;MID

## 2025-07-13 ASSESSMENT — PAIN DESCRIPTION - LOCATION
LOCATION: GROIN;KNEE
LOCATION: GROIN

## 2025-07-13 ASSESSMENT — PAIN - FUNCTIONAL ASSESSMENT: PAIN_FUNCTIONAL_ASSESSMENT: 0-10

## 2025-07-13 NOTE — H&P
HOSPITALISTS HISTORY AND PHYSICAL    7/13/2025 4:54 PM    Patient Information:  PEDRO MESA is a 65 y.o. male [id number]  PCP:  Chris Pena MD (Tel: [phone] )    Chief complaint:    Chief Complaint   Patient presents with    Groin Pain     Pt arrives from home by his daughter. Pt C/O R testicle pain for a month. Pt states that he has been having fever yesterday. Pt rates his pain a 10 out of 10. Pt states that he has been having more swelling.      History of Present Illness:  Pedro Mesa is a 65 y.o. male has had 1 month of worsening right lower testicular pain that is sharp in nature warm to touch states had a fever yesterday with chills and sweats no nausea vomiting no sick contacts states had a similar episode many years ago resolved with antibiotics patient does smoke 9 cigarettes daily no alcohol use history of diabetes on oral pills      REVIEW OF SYSTEMS:     ENT: Negative for rhinorrhea, epistaxis, hoarseness, sore throat.  Respiratory: Negative for shortness of breath,wheezing  Cardiovascular: Negative for chest pain, palpitations   Gastrointestinal: Negative for nausea, vomiting, diarrhea  Hematologic/Lymphatic: Negative for bleeding tendency, easy bruising  Musculoskeletal: Negative for myalgias and arthralgias  Neurologic: Negative for confusion,dysarthria.  Skin: Negative for itching,rash  Psychiatric: Negative for depression,anxiety, agitation.  Endocrine: Negative for polydipsia,polyuria,heat /cold intolerance.    Past Medical History:   has a past medical history of Diabetes mellitus (HCC) and Hypertension.     Past Surgical History:   has a past surgical history that includes Finger trigger release (Right, 3/3/2022) and Cholecystectomy, laparoscopic (N/A, 7/16/2024).     Medications:  No current facility-administered medications on file prior to encounter.     Current Outpatient

## 2025-07-13 NOTE — CONSULTS
Session ID: [id number]  Session Duration: Longer than 47 minutes  Language: French   ID: #[id number]   Name: Rafa

## 2025-07-13 NOTE — ACP (ADVANCE CARE PLANNING)
Advanced Care Planning Note.    Purpose of Encounter: Advanced care planning in light of hospitalization  Parties In Attendance: Patient,  daugther  Decisional Capacity: Yes  Subjective: Patient  understand that this conversation is to address long term care goal  Objective: Patient  to hospital with sepsis  Goals of Care Determination: Patient would pursue CPR and Intubation if required.  Code Status: full code  Time spent on Advanced care Plannin minutes  Advanced Care Planning Documents: documented patient's wishes, would like Wife  to make medical decisions if unable to make decisions    Tigre Tatum MD  2025 4:56 PM

## 2025-07-13 NOTE — PROGRESS NOTES
Patient arrived onto unit. Sign and held orders released. Vital signs obtained. Temp 101 degrees, prn tylenol given for fever. Med rec completed. Tele box 5t21 placed onto patient. Continuous fluids started.

## 2025-07-13 NOTE — ED NOTES
Patient Name: Jose Alejandro Mesa  : 1960 65 y.o.  MRN: [id number]  ED Room #: ED-0007/     Chief complaint:   Chief Complaint   Patient presents with    Groin Pain     Pt arrives from home by his daughter. Pt C/O R testicle pain for a month. Pt states that he has been having fever yesterday. Pt rates his pain a 10 out of 10. Pt states that he has been having more swelling.      Hospital Problem/Diagnosis:       O2 Flow Rate:O2 Device: None (Room air)   (if applicable)  Cardiac Rhythm:   (if applicable)  Active LDA's:   Peripheral IV 25 Posterior;Right Forearm (Active)   Site Assessment Clean, dry & intact 25 1222   Line Status Normal saline locked 25 1222   Phlebitis Assessment No symptoms 25 1222   Infiltration Assessment 0 25 1222   Alcohol Cap Used Yes 25 122   Dressing Status Clean, dry & intact 25 1222   Dressing Type Transparent 25 1222            How does patient ambulate? Stand by assist    2. How does patient take pills? Whole with Water    3. Is patient alert? Alert    4. Is patient oriented? To Person, To Place, To Time, and To Situation    5.   Patient arrived from:  home  Facility Name: ___________________________________________    6. If patient is disoriented or from a Skill Nursing Facility has family been notified of admission? No    7. Patient belongings? Clothing and cell phone    Disposition of belongings? Kept with Patient     8. Any specific patient or family belongings/needs/dynamics?   a. none    9. Miscellaneous comments/pending orders?  a. none      If there are any additional questions please reach out to the Emergency Department.

## 2025-07-13 NOTE — ED TRIAGE NOTES
Patient oriented to room and ED throughput process.  Safety measures with ED bed locked in lowest position and call light in reach.  Patient educated on all orders, including any medications.  Patient educated on chief complaint/symptoms. Patient encouraged to ask questions regarding care, medications or treatment plan.  Patient aware of how to reach staff with questions/concerns.     98

## 2025-07-13 NOTE — ED PROVIDER NOTES
The Ekg interpreted by me in the absence of a cardiologist shows.  Sinus tachycardia with a ventricular rate of 125.  Axis normal.  QTc appropriate.  No specific ST or T wave abnormality.  Compared to prior 5-9-2023, heart rate today is increased.      I only performed EKG interpretation and was not otherwise involved in the care of this patient.       Dorie Shah MD  07/13/25 1038

## 2025-07-13 NOTE — ED NOTES
Patient used urinal, this RN instructed patient to provide sample, however pt dumped urinal prior to this RN returning to room.

## 2025-07-13 NOTE — ED PROVIDER NOTES
[facility]  EMERGENCY DEPARTMENT ENCOUNTER        Pt Name: Jose Alejandro Mesa  MRN: [id number]  Birthdate 1960  Date of evaluation: 7/13/2025  Provider: Olaf Winn PA-C  PCP: Chris Pena MD  Note Started: 1:21 PM EDT 7/13/25      LAURA. I have evaluated this patient.      I personally saw the patient and independently provided 33 minutes of non-concurrent critical care time out of the total critical care time provided.  This excludes time spent doing separately billable procedures.  This includes time at the bedside, data interpretation, medication management, obtaining critical history from collateral sources if the patient is unable to provide it directly, and physician consultation.  Specifics of interventions taken and potentially life-threatening diagnostic considerations are listed above in the medical decision making.      CHIEF COMPLAINT       Chief Complaint   Patient presents with    Groin Pain     Pt arrives from home by his daughter. Pt C/O R testicle pain for a month. Pt states that he has been having fever yesterday. Pt rates his pain a 10 out of 10. Pt states that he has been having more swelling.        HISTORY OF PRESENT ILLNESS: 1 or more Elements     History From: patient  Limitations to history : Language history is translated by daughter at bedside with Romanian per patient's request    Jose Alejandro Mesa is a 65 y.o. male who presents to the emergency department with a chief complaint of fever and right testicular pain.  He has been having some right testicular pain for the past 1 month.  Became worse yesterday associated with fevers.  Has some chronic cough and shortness of breath and denies any change in this.  Denies dysuria, hematuria, diarrhea, bloody stool, rhinorrhea, nausea, vomiting or any other symptoms.  Has not taken anything for his fever today.    Nursing Notes were all reviewed and agreed with or any disagreements were addressed in the HPI.    REVIEW OF

## 2025-07-14 ENCOUNTER — APPOINTMENT (OUTPATIENT)
Dept: ULTRASOUND IMAGING | Age: 65
DRG: 720 | End: 2025-07-14
Payer: MEDICAID

## 2025-07-14 PROBLEM — N18.31 CKD STAGE 3A, GFR 45-59 ML/MIN (HCC): Status: ACTIVE | Noted: 2025-07-14

## 2025-07-14 PROBLEM — N17.9 AKI (ACUTE KIDNEY INJURY): Status: ACTIVE | Noted: 2025-07-14

## 2025-07-14 PROBLEM — N45.3 EPIDIDYMOORCHITIS: Status: ACTIVE | Noted: 2025-07-14

## 2025-07-14 LAB
ANION GAP SERPL CALCULATED.3IONS-SCNC: 13 MMOL/L (ref 3–16)
BASOPHILS # BLD: 0.1 K/UL (ref 0–0.2)
BASOPHILS NFR BLD: 0.5 %
BUN SERPL-MCNC: 22 MG/DL (ref 7–20)
CALCIUM SERPL-MCNC: 9 MG/DL (ref 8.3–10.6)
CHLORIDE SERPL-SCNC: 105 MMOL/L (ref 99–110)
CO2 SERPL-SCNC: 18 MMOL/L (ref 21–32)
CREAT SERPL-MCNC: 1.9 MG/DL (ref 0.8–1.3)
DEPRECATED RDW RBC AUTO: 13.9 % (ref 12.4–15.4)
EKG ATRIAL RATE: 125 BPM
EKG DIAGNOSIS: NORMAL
EKG P AXIS: 26 DEGREES
EKG P-R INTERVAL: 172 MS
EKG Q-T INTERVAL: 286 MS
EKG QRS DURATION: 88 MS
EKG QTC CALCULATION (BAZETT): 412 MS
EKG R AXIS: -65 DEGREES
EKG T AXIS: 19 DEGREES
EKG VENTRICULAR RATE: 125 BPM
EOSINOPHIL # BLD: 0 K/UL (ref 0–0.6)
EOSINOPHIL NFR BLD: 0.3 %
GFR SERPLBLD CREATININE-BSD FMLA CKD-EPI: 39 ML/MIN/{1.73_M2}
GLUCOSE BLD-MCNC: 153 MG/DL (ref 70–99)
GLUCOSE BLD-MCNC: 153 MG/DL (ref 70–99)
GLUCOSE BLD-MCNC: 155 MG/DL (ref 70–99)
GLUCOSE BLD-MCNC: 167 MG/DL (ref 70–99)
GLUCOSE SERPL-MCNC: 111 MG/DL (ref 70–99)
HCT VFR BLD AUTO: 36.3 % (ref 40.5–52.5)
HGB BLD-MCNC: 12.3 G/DL (ref 13.5–17.5)
LYMPHOCYTES # BLD: 1.4 K/UL (ref 1–5.1)
LYMPHOCYTES NFR BLD: 10.3 %
MCH RBC QN AUTO: 29.6 PG (ref 26–34)
MCHC RBC AUTO-ENTMCNC: 34 G/DL (ref 31–36)
MCV RBC AUTO: 87 FL (ref 80–100)
MONOCYTES # BLD: 1.4 K/UL (ref 0–1.3)
MONOCYTES NFR BLD: 9.7 %
NEUTROPHILS # BLD: 11 K/UL (ref 1.7–7.7)
NEUTROPHILS NFR BLD: 79.2 %
PERFORMED ON: ABNORMAL
PLATELET # BLD AUTO: 200 K/UL (ref 135–450)
PMV BLD AUTO: 9.1 FL (ref 5–10.5)
POTASSIUM SERPL-SCNC: 3.9 MMOL/L (ref 3.5–5.1)
RBC # BLD AUTO: 4.17 M/UL (ref 4.2–5.9)
SODIUM SERPL-SCNC: 136 MMOL/L (ref 136–145)
WBC # BLD AUTO: 13.9 K/UL (ref 4–11)

## 2025-07-14 PROCEDURE — 76770 US EXAM ABDO BACK WALL COMP: CPT

## 2025-07-14 PROCEDURE — 2500000003 HC RX 250 WO HCPCS: Performed by: INTERNAL MEDICINE

## 2025-07-14 PROCEDURE — 87491 CHLMYD TRACH DNA AMP PROBE: CPT

## 2025-07-14 PROCEDURE — 2580000003 HC RX 258: Performed by: INTERNAL MEDICINE

## 2025-07-14 PROCEDURE — 85025 COMPLETE CBC W/AUTO DIFF WBC: CPT

## 2025-07-14 PROCEDURE — 99223 1ST HOSP IP/OBS HIGH 75: CPT | Performed by: HOSPITALIST

## 2025-07-14 PROCEDURE — 36415 COLL VENOUS BLD VENIPUNCTURE: CPT

## 2025-07-14 PROCEDURE — 1200000000 HC SEMI PRIVATE

## 2025-07-14 PROCEDURE — 93010 ELECTROCARDIOGRAM REPORT: CPT | Performed by: INTERNAL MEDICINE

## 2025-07-14 PROCEDURE — 80048 BASIC METABOLIC PNL TOTAL CA: CPT

## 2025-07-14 PROCEDURE — 6370000000 HC RX 637 (ALT 250 FOR IP): Performed by: INTERNAL MEDICINE

## 2025-07-14 PROCEDURE — 6360000002 HC RX W HCPCS: Performed by: INTERNAL MEDICINE

## 2025-07-14 PROCEDURE — 87591 N.GONORRHOEAE DNA AMP PROB: CPT

## 2025-07-14 RX ORDER — LEVOFLOXACIN 5 MG/ML
750 INJECTION, SOLUTION INTRAVENOUS
Status: DISCONTINUED | OUTPATIENT
Start: 2025-07-15 | End: 2025-07-16

## 2025-07-14 RX ADMIN — GABAPENTIN 300 MG: 300 CAPSULE ORAL at 22:08

## 2025-07-14 RX ADMIN — FAMOTIDINE 20 MG: 20 TABLET, FILM COATED ORAL at 22:09

## 2025-07-14 RX ADMIN — HYDROCHLOROTHIAZIDE 25 MG: 25 TABLET ORAL at 08:57

## 2025-07-14 RX ADMIN — SUCRALFATE 1 G: 1 TABLET ORAL at 15:07

## 2025-07-14 RX ADMIN — Medication 2000 UNITS: at 08:57

## 2025-07-14 RX ADMIN — SODIUM CHLORIDE, SODIUM LACTATE, POTASSIUM CHLORIDE, AND CALCIUM CHLORIDE: .6; .31; .03; .02 INJECTION, SOLUTION INTRAVENOUS at 06:15

## 2025-07-14 RX ADMIN — SUCRALFATE 1 G: 1 TABLET ORAL at 12:31

## 2025-07-14 RX ADMIN — GABAPENTIN 300 MG: 300 CAPSULE ORAL at 08:57

## 2025-07-14 RX ADMIN — SODIUM CHLORIDE, PRESERVATIVE FREE 10 ML: 5 INJECTION INTRAVENOUS at 22:11

## 2025-07-14 RX ADMIN — GABAPENTIN 300 MG: 300 CAPSULE ORAL at 15:07

## 2025-07-14 RX ADMIN — SUCRALFATE 1 G: 1 TABLET ORAL at 06:15

## 2025-07-14 RX ADMIN — ENOXAPARIN SODIUM 40 MG: 100 INJECTION SUBCUTANEOUS at 08:56

## 2025-07-14 RX ADMIN — ATORVASTATIN CALCIUM 80 MG: 80 TABLET, FILM COATED ORAL at 22:09

## 2025-07-14 RX ADMIN — SODIUM CHLORIDE, PRESERVATIVE FREE 10 ML: 5 INJECTION INTRAVENOUS at 09:07

## 2025-07-14 RX ADMIN — ACETAMINOPHEN 650 MG: 325 TABLET ORAL at 08:58

## 2025-07-14 RX ADMIN — SUCRALFATE 1 G: 1 TABLET ORAL at 22:08

## 2025-07-14 RX ADMIN — ASPIRIN 81 MG: 81 TABLET, COATED ORAL at 08:57

## 2025-07-14 RX ADMIN — CELECOXIB 200 MG: 100 CAPSULE ORAL at 22:16

## 2025-07-14 RX ADMIN — FAMOTIDINE 20 MG: 20 TABLET, FILM COATED ORAL at 08:58

## 2025-07-14 RX ADMIN — ACETAMINOPHEN 650 MG: 325 TABLET ORAL at 22:07

## 2025-07-14 RX ADMIN — CELECOXIB 200 MG: 100 CAPSULE ORAL at 08:57

## 2025-07-14 RX ADMIN — TAMSULOSIN HYDROCHLORIDE 0.4 MG: 0.4 CAPSULE ORAL at 22:08

## 2025-07-14 RX ADMIN — POLYVINYL ALCOHOL, POVIDONE 1 DROP: 14; 6 SOLUTION/ DROPS OPHTHALMIC at 08:58

## 2025-07-14 ASSESSMENT — PAIN SCALES - GENERAL
PAINLEVEL_OUTOF10: 7
PAINLEVEL_OUTOF10: 5

## 2025-07-14 ASSESSMENT — PAIN DESCRIPTION - ORIENTATION: ORIENTATION: LOWER

## 2025-07-14 ASSESSMENT — PAIN DESCRIPTION - PAIN TYPE
TYPE: ACUTE PAIN
TYPE: ACUTE PAIN

## 2025-07-14 ASSESSMENT — PAIN DESCRIPTION - LOCATION
LOCATION: NECK
LOCATION: HEAD

## 2025-07-14 NOTE — CONSULTS
Urology Consult Note  [facility]     Patient: Jose Alejandro Mesa MRN: [id number]  Room/Bed: 5TN-5569/5569-01   YOB: 1960  Age/Sex: 65 y.o.male  Admission Date: 7/13/2025     Date of Service:  7/14/2025    Consulting Provider: KANDACE Jacques CNP   Admitting/Requesting Physician: Tigre Tatum MD  Primary Care Physician: Chris Pena MD    Reason for Consult: Orchalgia    ASSESSMENT/PLAN     64 yo male admitted with right orchalgia  Exam with right epididymo-orchitis, confirmed by BREANA  CT a/p negative from gu standpoint    Cr is 1.9  WBC 13.9 from 14k    Recommendations:  Cotninue abx and fu cultures  Bladder scan and continue flomax  Will follow with serial exams  Very Low threshold to require orchiectomy    All patient questions were answered. He understands the plan as listed above.    HISTORY     Chief Complaint:   Chief Complaint   Patient presents with    Groin Pain     Pt arrives from home by his daughter. Pt C/O R testicle pain for a month. Pt states that he has been having fever yesterday. Pt rates his pain a 10 out of 10. Pt states that he has been having more swelling.        History of Present Illness: Jose Alejandro Mesa is a 65 y.o. male with orchalgia. Onset of symptoms was days ago with improving course since that time. Symptoms are aggravated by abx. Symptoms improved with abx and pain meds. Associated symptoms include testi pain. Patient also reports feeling fine. He has tried the following treatments: abx    Past Medical History:  He has a past medical history of Diabetes mellitus (HCC) and Hypertension.     Hospital Problem List:  Principal Problem:    Sepsis (HCC)  Resolved Problems:    * No resolved hospital problems. *      Past Surgical History:  He has a past surgical history that includes Finger trigger release (Right, 3/3/2022) and Cholecystectomy, laparoscopic (N/A, 7/16/2024).     Social History:  He reports that he has been smoking cigarettes. He

## 2025-07-14 NOTE — PLAN OF CARE
Problem: Chronic Conditions and Co-morbidities  Goal: Patient's chronic conditions and co-morbidity symptoms are monitored and maintained or improved  7/14/2025 1104 by Christiana Carballo RN  Outcome: Progressing     Problem: Pain  Goal: Verbalizes/displays adequate comfort level or baseline comfort level  7/14/2025 1104 by Christiana Carballo RN  Outcome: Progressing     Problem: ABCDS Injury Assessment  Goal: Absence of physical injury  7/14/2025 1104 by Christiana Carbalol RN  Outcome: Progressing     Problem: Safety - Adult  Goal: Free from fall injury  7/14/2025 1104 by Christiana Carballo RN  Outcome: Progressing

## 2025-07-14 NOTE — PROGRESS NOTES
Nephrology consult received - full consult note to follow.   Thank you for allowing us to participate in this patient’s care. Please do not hesitate to contact, if any questions or concerns. We will follow along with you.     Kraig Rachel MD  [facility]   [phone]   [address].

## 2025-07-14 NOTE — PROGRESS NOTES
[facility]    7/14/2025 10:59 AM        Name: Jose Alejandro Mesa .              Admitted: 7/13/2025  Primary Care Provider: Chris Pena MD (Tel: [phone])        Subjective:    Patient lying in bed still having fevers still having pain in right scrotum    Reviewed interval ancillary notes    Current Medications  [START ON 7/15/2025] levoFLOXacin (LEVAQUIN) 750 MG/150ML infusion 750 mg, Q48H  lactated ringers infusion, Continuous  morphine (PF) injection 2 mg, Q4H PRN  aspirin EC tablet 81 mg, Daily  atorvastatin (LIPITOR) tablet 80 mg, Nightly  Polyvinyl Alcohol-Povidone PF (REFRESH) 1.4-0.6 % ophthalmic solution 1 drop, Daily  celecoxib (CELEBREX) capsule 200 mg, BID  Vitamin D (CHOLECALCIFEROL) tablet 2,000 Units, Daily with breakfast  famotidine (PEPCID) tablet 20 mg, BID  gabapentin (NEURONTIN) capsule 300 mg, TID  insulin lispro (HUMALOG,ADMELOG) injection vial 0-8 Units, 4x Daily AC & HS  hydroCHLOROthiazide (HYDRODIURIL) tablet 25 mg, Daily  sucralfate (CARAFATE) tablet 1 g, 4x Daily AC & HS  tamsulosin (FLOMAX) capsule 0.4 mg, Nightly  sodium chloride flush 0.9 % injection 5-40 mL, 2 times per day  sodium chloride flush 0.9 % injection 5-40 mL, PRN  0.9 % sodium chloride infusion, PRN  enoxaparin (LOVENOX) injection 40 mg, Daily  polyethylene glycol (GLYCOLAX) packet 17 g, Daily PRN  acetaminophen (TYLENOL) tablet 650 mg, Q6H PRN   Or  acetaminophen (TYLENOL) suppository 650 mg, Q6H PRN        Objective:  /64   Pulse (!) 107   Temp (!) 100.9 °F (38.3 °C) (Oral)   Resp 16   Ht 1.524 m (5')   Wt 76.5 kg (168 lb 9 oz)   SpO2 95%   BMI 32.92 kg/m²     Intake/Output Summary (Last 24 hours) at 7/14/2025 1059  Last data filed at 7/14/2025 0826  Gross per 24 hour   Intake 240 ml   Output 600 ml   Net -360 ml      Wt Readings from Last 3 Encounters:   07/13/25 76.5 kg (168 lb 9 oz)   07/09/25 76.1 kg

## 2025-07-14 NOTE — CARE COORDINATION
Discharge Planning:     (CM) reviewed the patient's chart to assess needs. Patient's Readmission Risk Score is 10%. Patient's medical insurance is [employer]. Patient's PCP is GILL CALVERT .     No needs anticipated, at this time. CM team to follow. Staff to inform CM if additional discharge needs arise.     Electronically signed by Betzaida Santillan on 7/14/25 at 8:42 AM EDT

## 2025-07-14 NOTE — PLAN OF CARE
Problem: Chronic Conditions and Co-morbidities  Goal: Patient's chronic conditions and co-morbidity symptoms are monitored and maintained or improved  7/14/2025 0151 by Jacky Ocampo RN  Outcome: Progressing  7/14/2025 0148 by Jacky Ocampo RN  Outcome: Progressing     Problem: Pain  Goal: Verbalizes/displays adequate comfort level or baseline comfort level  7/14/2025 0151 by Jacky Ocampo RN  Outcome: Progressing  7/14/2025 0148 by Jacky Ocampo RN  Outcome: Progressing     Problem: ABCDS Injury Assessment  Goal: Absence of physical injury  7/14/2025 0151 by Jacky Ocampo RN  Outcome: Progressing  7/14/2025 0148 by Jakcy Ocampo RN  Outcome: Progressing     Problem: Safety - Adult  Goal: Free from fall injury  7/14/2025 0151 by Jacky Ocampo RN  Outcome: Progressing  7/14/2025 0148 by Jacky Ocampo RN  Outcome: Progressing

## 2025-07-14 NOTE — PROGRESS NOTES
4 Eyes Skin Assessment     NAME:  Jose Alejandro Mesa  YOB: 1960  MEDICAL RECORD NUMBER:  [id number]    The patient is being assessed for  Admission    I agree that at least one RN has performed a thorough Head to Toe Skin Assessment on the patient. ALL assessment sites listed below have been assessed.      Areas assessed by both nurses:    Head, Face, Ears, Shoulders, Back, Chest, Arms, Elbows, Hands, Sacrum. Buttock, Coccyx, Ischium, Legs. Feet and Heels, and Under Medical Devices         Does the Patient have a Wound? No noted wound(s)       Jacob Prevention initiated by RN: Yes  Wound Care Orders initiated by RN: No    Pressure Injury (Stage 1,2,3,4, Unstageable, DTI, NWPT, and Complex wounds) if present, place Wound referral order by RN under : No    New Ostomies, if present place, Ostomy referral order under : No     Nurse 1 eSignature: Electronically signed by Jacky Ocampo RN on 7/14/25 at 1:52 AM EDT    **SHARE this note so that the co-signing nurse can place an eSignature**    Nurse 2 eSignature: Electronically signed by Georgette Drew RN on 7/14/25 at 4:21 AM EDT

## 2025-07-14 NOTE — CONSULTS
Nephrology Consult Note                                                                                                                                                                                                                                                                                                                                                               Office : [phone]     Fax :[phone]    Patient's Name: Jose Alejandro Mesa  11:14 AM  7/14/2025    Reason for Consult:  NANETTE  Requesting Physician:  Chris Pena MD  Chief Complaint:    Chief Complaint   Patient presents with    Groin Pain     Pt arrives from home by his daughter. Pt C/O R testicle pain for a month. Pt states that he has been having fever yesterday. Pt rates his pain a 10 out of 10. Pt states that he has been having more swelling.        Assessment/Plan     NANETTE   From sepsis   Creatinine at baseline 1.5   On admission 2.0 --> 1.9   Continue antibiotics and IV fluids   Renal ultrasound   We will monitor creatinine   Right epididymo-orchitis  Urology on board   Check urine for Gonorrhea and Chlamydia   Sepsis  Blood cultures   Antibiotics   CKD 3a  No proteinuria     History of Present Ilness:    Jose Alejandro Mesa is a 65 y.o. male with 1 month of worsening right lower testicular pain that is sharp in nature warm to touch. States had a fever with chills and sweats no nausea vomiting no sick contacts states had a similar episode many years ago resolved with antibiotics. Smoke cigarettes daily no alcohol use, history of diabetes on oral pills.       Interval hx     Past Medical History:   Diagnosis Date    Diabetes mellitus (HCC)     Hypertension        Past Surgical History:   Procedure Laterality Date    CHOLECYSTECTOMY, LAPAROSCOPIC N/A 7/16/2024    ROBOTIC LAPAROSCOPIC CHOLECYSTECTOMY WITH CHOLANGIOGRAM performed by Zhou Meadows MD at [facility] OR    FINGER TRIGGER RELEASE Right 3/3/2022    RIGHT LONG FINGER TRIGGER FINGER  RELEASE performed by Monae Wahl MD at [facility] OR       History reviewed. No pertinent family history.     reports that he has been smoking cigarettes. He started smoking about 35 years ago. He has a 9 pack-year smoking history. He has never used smokeless tobacco. He reports that he does not drink alcohol and does not use drugs.    Allergies:  Lisinopril    Current Medications:    [START ON 7/15/2025] levoFLOXacin (LEVAQUIN) 750 MG/150ML infusion 750 mg, Q48H  lactated ringers infusion, Continuous  morphine (PF) injection 2 mg, Q4H PRN  aspirin EC tablet 81 mg, Daily  atorvastatin (LIPITOR) tablet 80 mg, Nightly  Polyvinyl Alcohol-Povidone PF (REFRESH) 1.4-0.6 % ophthalmic solution 1 drop, Daily  celecoxib (CELEBREX) capsule 200 mg, BID  Vitamin D (CHOLECALCIFEROL) tablet 2,000 Units, Daily with breakfast  famotidine (PEPCID) tablet 20 mg, BID  gabapentin (NEURONTIN) capsule 300 mg, TID  insulin lispro (HUMALOG,ADMELOG) injection vial 0-8 Units, 4x Daily AC & HS  hydroCHLOROthiazide (HYDRODIURIL) tablet 25 mg, Daily  sucralfate (CARAFATE) tablet 1 g, 4x Daily AC & HS  tamsulosin (FLOMAX) capsule 0.4 mg, Nightly  sodium chloride flush 0.9 % injection 5-40 mL, 2 times per day  sodium chloride flush 0.9 % injection 5-40 mL, PRN  0.9 % sodium chloride infusion, PRN  enoxaparin (LOVENOX) injection 40 mg, Daily  polyethylene glycol (GLYCOLAX) packet 17 g, Daily PRN  acetaminophen (TYLENOL) tablet 650 mg, Q6H PRN   Or  acetaminophen (TYLENOL) suppository 650 mg, Q6H PRN        Review of Systems:   14 point ROS obtained but were negative except mentioned in HPI      Physical exam:     Vitals:  /64   Pulse (!) 107   Temp (!) 100.9 °F (38.3 °C) (Oral)   Resp 16   Ht 1.524 m (5')   Wt 76.5 kg (168 lb 9 oz)   SpO2 95%   BMI 32.92 kg/m²   Constitutional:  OAA X3 NAD Yes  Skin: no rash, turgor wnl  Heent:  eomi, mmm  Neck: no bruits or jvd noted  Cardiovascular:  S1, S2 without m/r/g  Respiratory: CTA B without

## 2025-07-15 LAB
ANION GAP SERPL CALCULATED.3IONS-SCNC: 10 MMOL/L (ref 3–16)
BACTERIA UR CULT: ABNORMAL
BASOPHILS # BLD: 0.1 K/UL (ref 0–0.2)
BASOPHILS NFR BLD: 0.5 %
BUN SERPL-MCNC: 26 MG/DL (ref 7–20)
CALCIUM SERPL-MCNC: 9.1 MG/DL (ref 8.3–10.6)
CHLORIDE SERPL-SCNC: 106 MMOL/L (ref 99–110)
CO2 SERPL-SCNC: 21 MMOL/L (ref 21–32)
CREAT SERPL-MCNC: 2 MG/DL (ref 0.8–1.3)
DEPRECATED RDW RBC AUTO: 13.8 % (ref 12.4–15.4)
EOSINOPHIL # BLD: 0.3 K/UL (ref 0–0.6)
EOSINOPHIL NFR BLD: 2.7 %
GFR SERPLBLD CREATININE-BSD FMLA CKD-EPI: 36 ML/MIN/{1.73_M2}
GLUCOSE BLD-MCNC: 134 MG/DL (ref 70–99)
GLUCOSE BLD-MCNC: 147 MG/DL (ref 70–99)
GLUCOSE BLD-MCNC: 148 MG/DL (ref 70–99)
GLUCOSE BLD-MCNC: 245 MG/DL (ref 70–99)
GLUCOSE SERPL-MCNC: 174 MG/DL (ref 70–99)
HCT VFR BLD AUTO: 34.6 % (ref 40.5–52.5)
HGB BLD-MCNC: 11.9 G/DL (ref 13.5–17.5)
LYMPHOCYTES # BLD: 1.9 K/UL (ref 1–5.1)
LYMPHOCYTES NFR BLD: 19.5 %
MCH RBC QN AUTO: 29.8 PG (ref 26–34)
MCHC RBC AUTO-ENTMCNC: 34.3 G/DL (ref 31–36)
MCV RBC AUTO: 87 FL (ref 80–100)
MONOCYTES # BLD: 1.1 K/UL (ref 0–1.3)
MONOCYTES NFR BLD: 11.8 %
NEUTROPHILS # BLD: 6.3 K/UL (ref 1.7–7.7)
NEUTROPHILS NFR BLD: 65.5 %
ORGANISM: ABNORMAL
PERFORMED ON: ABNORMAL
PLATELET # BLD AUTO: 172 K/UL (ref 135–450)
PMV BLD AUTO: 9 FL (ref 5–10.5)
POTASSIUM SERPL-SCNC: 3.6 MMOL/L (ref 3.5–5.1)
RBC # BLD AUTO: 3.98 M/UL (ref 4.2–5.9)
SODIUM SERPL-SCNC: 137 MMOL/L (ref 136–145)
WBC # BLD AUTO: 9.6 K/UL (ref 4–11)

## 2025-07-15 PROCEDURE — 1200000000 HC SEMI PRIVATE

## 2025-07-15 PROCEDURE — 2580000003 HC RX 258: Performed by: INTERNAL MEDICINE

## 2025-07-15 PROCEDURE — 85025 COMPLETE CBC W/AUTO DIFF WBC: CPT

## 2025-07-15 PROCEDURE — 6360000002 HC RX W HCPCS: Performed by: INTERNAL MEDICINE

## 2025-07-15 PROCEDURE — 6370000000 HC RX 637 (ALT 250 FOR IP): Performed by: INTERNAL MEDICINE

## 2025-07-15 PROCEDURE — 2500000003 HC RX 250 WO HCPCS: Performed by: INTERNAL MEDICINE

## 2025-07-15 PROCEDURE — 80048 BASIC METABOLIC PNL TOTAL CA: CPT

## 2025-07-15 PROCEDURE — 36415 COLL VENOUS BLD VENIPUNCTURE: CPT

## 2025-07-15 PROCEDURE — 6370000000 HC RX 637 (ALT 250 FOR IP): Performed by: HOSPITALIST

## 2025-07-15 PROCEDURE — 99233 SBSQ HOSP IP/OBS HIGH 50: CPT | Performed by: HOSPITALIST

## 2025-07-15 RX ORDER — ACETAMINOPHEN 500 MG
1000 TABLET ORAL EVERY 8 HOURS SCHEDULED
Status: DISCONTINUED | OUTPATIENT
Start: 2025-07-15 | End: 2025-07-16 | Stop reason: HOSPADM

## 2025-07-15 RX ADMIN — SUCRALFATE 1 G: 1 TABLET ORAL at 20:06

## 2025-07-15 RX ADMIN — INSULIN LISPRO 2 UNITS: 100 INJECTION, SOLUTION INTRAVENOUS; SUBCUTANEOUS at 20:07

## 2025-07-15 RX ADMIN — GABAPENTIN 300 MG: 300 CAPSULE ORAL at 20:07

## 2025-07-15 RX ADMIN — LEVOFLOXACIN 750 MG: 750 INJECTION, SOLUTION INTRAVENOUS at 17:16

## 2025-07-15 RX ADMIN — Medication 2000 UNITS: at 08:18

## 2025-07-15 RX ADMIN — ENOXAPARIN SODIUM 40 MG: 100 INJECTION SUBCUTANEOUS at 08:18

## 2025-07-15 RX ADMIN — SUCRALFATE 1 G: 1 TABLET ORAL at 17:19

## 2025-07-15 RX ADMIN — SUCRALFATE 1 G: 1 TABLET ORAL at 12:50

## 2025-07-15 RX ADMIN — SODIUM CHLORIDE, PRESERVATIVE FREE 10 ML: 5 INJECTION INTRAVENOUS at 09:35

## 2025-07-15 RX ADMIN — ACETAMINOPHEN 1000 MG: 500 TABLET ORAL at 09:33

## 2025-07-15 RX ADMIN — TAMSULOSIN HYDROCHLORIDE 0.4 MG: 0.4 CAPSULE ORAL at 20:06

## 2025-07-15 RX ADMIN — ATORVASTATIN CALCIUM 80 MG: 80 TABLET, FILM COATED ORAL at 20:07

## 2025-07-15 RX ADMIN — SODIUM CHLORIDE, SODIUM LACTATE, POTASSIUM CHLORIDE, AND CALCIUM CHLORIDE: .6; .31; .03; .02 INJECTION, SOLUTION INTRAVENOUS at 03:42

## 2025-07-15 RX ADMIN — GABAPENTIN 300 MG: 300 CAPSULE ORAL at 14:46

## 2025-07-15 RX ADMIN — ASPIRIN 81 MG: 81 TABLET, COATED ORAL at 08:19

## 2025-07-15 RX ADMIN — FAMOTIDINE 20 MG: 20 TABLET, FILM COATED ORAL at 20:06

## 2025-07-15 RX ADMIN — POLYVINYL ALCOHOL, POVIDONE 1 DROP: 14; 6 SOLUTION/ DROPS OPHTHALMIC at 08:18

## 2025-07-15 RX ADMIN — SODIUM CHLORIDE, SODIUM LACTATE, POTASSIUM CHLORIDE, AND CALCIUM CHLORIDE: .6; .31; .03; .02 INJECTION, SOLUTION INTRAVENOUS at 14:51

## 2025-07-15 RX ADMIN — SUCRALFATE 1 G: 1 TABLET ORAL at 07:03

## 2025-07-15 RX ADMIN — FAMOTIDINE 20 MG: 20 TABLET, FILM COATED ORAL at 08:18

## 2025-07-15 RX ADMIN — ACETAMINOPHEN 1000 MG: 500 TABLET ORAL at 21:23

## 2025-07-15 RX ADMIN — ACETAMINOPHEN 1000 MG: 500 TABLET ORAL at 14:45

## 2025-07-15 RX ADMIN — SODIUM CHLORIDE, PRESERVATIVE FREE 10 ML: 5 INJECTION INTRAVENOUS at 20:10

## 2025-07-15 RX ADMIN — GABAPENTIN 300 MG: 300 CAPSULE ORAL at 08:19

## 2025-07-15 ASSESSMENT — PAIN SCALES - GENERAL
PAINLEVEL_OUTOF10: 0
PAINLEVEL_OUTOF10: 7
PAINLEVEL_OUTOF10: 6

## 2025-07-15 ASSESSMENT — PAIN DESCRIPTION - ORIENTATION: ORIENTATION: LOWER

## 2025-07-15 ASSESSMENT — PAIN DESCRIPTION - LOCATION: LOCATION: FLANK

## 2025-07-15 NOTE — PROGRESS NOTES
At 2202 patient temperature was 101.0 , 650 mg of tylenol was given , Temp at 11: 42 is 99.3. will continue to monitor.

## 2025-07-15 NOTE — PROGRESS NOTES
Urology Progress Note  [facility]     Patient: Jose Alejandro Mesa MRN: [id number]  Room/Bed: 5TN-5569/5569-01   YOB: 1960  Age/Sex: 65 y.o.male  Admission Date: 7/13/2025     Date of Service:  7/15/2025    ASSESSMENT/PLAN     64 yo male admitted with right orchalgia  Exam with right epididymo-orchitis, confirmed by BREANA  CT a/p negative from gu standpoint     Cr is 1.9  WBC 13.9 from 14k  =====  Cr 2.0  WBC 9.6 from 13.9k  Exam improved       Recommendations:  Cotninue abx and fu cultures  Flomax  Anticipate discharge on PO abx with return of sensitivities, perhaps tomorrow if exam remains stable. Will follow.       All patient questions were answered. He understands the plan as listed above.    SUBJECTIVE     Chief Complaint:   Chief Complaint   Patient presents with    Groin Pain     Pt arrives from home by his daughter. Pt C/O R testicle pain for a month. Pt states that he has been having fever yesterday. Pt rates his pain a 10 out of 10. Pt states that he has been having more swelling.        24 Hour Events: Today patient reports see hpi.  Otherwise symptoms are overall improved and pain is adequately controlled.  Denies nausea/vomiting.  No other genitourinary symptoms.    OBJECTIVE     Hospital Problem List:  Principal Problem:    Sepsis (HCC)  Active Problems:    Epididymoorchitis    NANETTE (acute kidney injury)    CKD stage 3a, GFR 45-59 ml/min ([facility])  Resolved Problems:    * No resolved hospital problems. *      Physical Exam:  Vitals:    07/15/25 0800   BP: 100/68   Pulse: 64   Resp: 16   Temp: 97.6 °F (36.4 °C)   SpO2: 96%     CONSTITUTIONAL: The patient is well nourished/developed, with no distress noted.   NEUROLOGICAL/PSYCHIATRIC: Oriented to place and time, normal affected noted.   CARDIOVASCULAR: Regular rate and rhythm, no evidence of swelling noted.   RESPIRATORY: Normal respiratory effort with no wheezing noted.   ABDOMEN: Abdomen soft, non-tender, non-distended. No enlarged  arterial and venous Doppler flow within the testicle. Scrotal Sac: While there is no evidence of a hydrocele, there is a small left varicocele. Epididymis: There is a 4 x 2 x 2 mm left epididymal cyst.     1. Findings are consistent with right epididymo-orchitis. 2. No evidence of testicular torsion or mass. 3. Bilateral testicular microlithiasis.  This is most likely a benign innocuous finding.  However, if the patient is at increased risk for testicular neoplasia, including a family history of testicular neoplasia, or a personal history of undescended testicles or prior orchiopexy, annual scrotal ultrasound follow-up would be recommended. 4. Small bilateral varicoceles. 5. Small left epididymal cyst.     XR CHEST PORTABLE  Result Date: 7/13/2025  EXAMINATION: ONE XRAY VIEW OF THE CHEST 7/13/2025 12:37 pm COMPARISON: 05/09/2023 HISTORY: ORDERING SYSTEM PROVIDED HISTORY: fever TECHNOLOGIST PROVIDED HISTORY: Reason for exam:->fever Reason for Exam: Groin Pain (Pt arrives from home by his daughter. Pt C/O R testicle pain for a month. Pt states that he has been having fever yesterday. Pt rates his pain a 10 out of 10. Pt states that he has been having more swelling. ) FINDINGS: The lungs are without acute focal process.  There is no effusion or pneumothorax. The cardiomediastinal silhouette is without acute process. The osseous structures are without acute process.     No acute process.            Electronically signed by: KANDACE Jacques CNP, 7/15/2025 12:00 PM  [facility]  Office Contact: [phone]

## 2025-07-15 NOTE — PROGRESS NOTES
Nephrology Note                                                                                                                                                                                                                                                                                                                                                               Office : [phone]     Fax :[phone]    Patient's Name: Jose Alejandro Mesa  2:59 PM  7/15/2025    Reason for Consult:  NANETTE  Requesting Physician:  Chris Pena MD  Chief Complaint:    Chief Complaint   Patient presents with    Groin Pain     Pt arrives from home by his daughter. Pt C/O R testicle pain for a month. Pt states that he has been having fever yesterday. Pt rates his pain a 10 out of 10. Pt states that he has been having more swelling.        Assessment/Plan     NANETTE   From sepsis   Creatinine at baseline 1.5   On admission 2.0   Continue antibiotics and IV fluids   Renal ultrasound without issues   We will monitor creatinine   Right epididymo-orchitis  Urology on board   Check urine for Gonorrhea and Chlamydia   Sepsis  Blood cultures   Antibiotics   CKD 3a  No proteinuria     History of Present Ilness:    Jose Alejandro Mesa is a 65 y.o. male with 1 month of worsening right lower testicular pain that is sharp in nature warm to touch. States had a fever with chills and sweats no nausea vomiting no sick contacts states had a similar episode many years ago resolved with antibiotics. Smoke cigarettes daily no alcohol use, history of diabetes on oral pills.       Interval hx       RFP stable  Celecoxib stopped   HCTZ stopped   Tylenol 1 gram tid      Past Surgical History:   Procedure Laterality Date    CHOLECYSTECTOMY, LAPAROSCOPIC N/A 7/16/2024    ROBOTIC LAPAROSCOPIC CHOLECYSTECTOMY WITH CHOLANGIOGRAM performed by Zhou Meadows MD at [facility] OR    FINGER TRIGGER RELEASE Right 3/3/2022    RIGHT LONG FINGER TRIGGER FINGER RELEASE performed by

## 2025-07-15 NOTE — PLAN OF CARE
Shift Assessment completed, Medication administered. IV infusing. Patient is alert and oriented lying supine. Patient not in pain. Lights are dim as per Patient request. Bed in lowest position and call light in place.  Problem: Chronic Conditions and Co-morbidities  Goal: Patient's chronic conditions and co-morbidity symptoms are monitored and maintained or improved  7/15/2025 0958 by Christiana Carballo RN  Outcome: Progressing     Problem: Pain  Goal: Verbalizes/displays adequate comfort level or baseline comfort level  7/15/2025 0958 by Christiana Carballo RN  Outcome: Progressing     Problem: ABCDS Injury Assessment  Goal: Absence of physical injury  7/15/2025 0958 by Christiana Carballo RN  Outcome: Progressing     Problem: Safety - Adult  Goal: Free from fall injury  7/15/2025 0958 by Christiana Carballo RN  Outcome: Progressing

## 2025-07-15 NOTE — PLAN OF CARE
Problem: Chronic Conditions and Co-morbidities  Goal: Patient's chronic conditions and co-morbidity symptoms are monitored and maintained or improved  7/15/2025 1930 by Mariel Ocasio LPN  Outcome: Progressing     Problem: Pain  Goal: Verbalizes/displays adequate comfort level or baseline comfort level  7/15/2025 1930 by Mariel Ocasio LPN  Outcome: Progressing     Problem: ABCDS Injury Assessment  Goal: Absence of physical injury  7/15/2025 1930 by Mariel Ocasio LPN  Outcome: Progressing     Problem: Safety - Adult  Goal: Free from fall injury  7/15/2025 1930 by Mariel Ocasio LPN  Outcome: Progressing

## 2025-07-15 NOTE — CONSULTS
Session ID: [id number]  Session Duration: 1 minutes  Language: Luxembourgish   ID: #[id number]   Name: Eulalia

## 2025-07-15 NOTE — PROGRESS NOTES
[facility]    7/15/2025 10:10 AM        Name: Jose Alejandro Mesa .              Admitted: 7/13/2025  Primary Care Provider: Chris Pena MD (Tel: [phone])        Subjective:    Right scrotum pain improving, no n.v  Had feer overnight    Reviewed interval ancillary notes    Current Medications  acetaminophen (TYLENOL) tablet 1,000 mg, 3 times per day  levoFLOXacin (LEVAQUIN) 750 MG/150ML infusion 750 mg, Q48H  lactated ringers infusion, Continuous  morphine (PF) injection 2 mg, Q4H PRN  aspirin EC tablet 81 mg, Daily  atorvastatin (LIPITOR) tablet 80 mg, Nightly  Polyvinyl Alcohol-Povidone PF (REFRESH) 1.4-0.6 % ophthalmic solution 1 drop, Daily  Vitamin D (CHOLECALCIFEROL) tablet 2,000 Units, Daily with breakfast  famotidine (PEPCID) tablet 20 mg, BID  gabapentin (NEURONTIN) capsule 300 mg, TID  insulin lispro (HUMALOG,ADMELOG) injection vial 0-8 Units, 4x Daily AC & HS  sucralfate (CARAFATE) tablet 1 g, 4x Daily AC & HS  tamsulosin (FLOMAX) capsule 0.4 mg, Nightly  sodium chloride flush 0.9 % injection 5-40 mL, 2 times per day  sodium chloride flush 0.9 % injection 5-40 mL, PRN  0.9 % sodium chloride infusion, PRN  enoxaparin (LOVENOX) injection 40 mg, Daily  polyethylene glycol (GLYCOLAX) packet 17 g, Daily PRN  acetaminophen (TYLENOL) tablet 650 mg, Q6H PRN   Or  acetaminophen (TYLENOL) suppository 650 mg, Q6H PRN        Objective:  /68   Pulse 64   Temp 97.6 °F (36.4 °C) (Oral)   Resp 16   Ht 1.524 m (5')   Wt 76.5 kg (168 lb 9 oz)   SpO2 96%   BMI 32.92 kg/m²     Intake/Output Summary (Last 24 hours) at 7/15/2025 1010  Last data filed at 7/15/2025 0705  Gross per 24 hour   Intake 360 ml   Output 3150 ml   Net -2790 ml      Wt Readings from Last 3 Encounters:   07/13/25 76.5 kg (168 lb 9 oz)   07/09/25 76.1 kg (167 lb 12.8 oz)   04/24/25 78.6 kg (173 lb 3.2 oz)       General appearance:  Appears

## 2025-07-15 NOTE — PLAN OF CARE
Problem: Chronic Conditions and Co-morbidities  Goal: Patient's chronic conditions and co-morbidity symptoms are monitored and maintained or improved  7/14/2025 2228 by Mariel Ocasio LPN  Outcome: Progressing     Problem: Pain  Goal: Verbalizes/displays adequate comfort level or baseline comfort level  7/14/2025 2228 by Mariel Ocasio LPN  Outcome: Progressing     Problem: ABCDS Injury Assessment  Goal: Absence of physical injury  7/14/2025 2228 by Mariel Ocasio LPN  Outcome: Progressing

## 2025-07-16 VITALS
HEART RATE: 69 BPM | HEIGHT: 60 IN | RESPIRATION RATE: 16 BRPM | TEMPERATURE: 97.6 F | OXYGEN SATURATION: 97 % | WEIGHT: 168.56 LBS | DIASTOLIC BLOOD PRESSURE: 81 MMHG | SYSTOLIC BLOOD PRESSURE: 127 MMHG | BODY MASS INDEX: 33.09 KG/M2

## 2025-07-16 LAB
ANION GAP SERPL CALCULATED.3IONS-SCNC: 9 MMOL/L (ref 3–16)
BASOPHILS # BLD: 0 K/UL (ref 0–0.2)
BASOPHILS NFR BLD: 0.5 %
BUN SERPL-MCNC: 18 MG/DL (ref 7–20)
CALCIUM SERPL-MCNC: 9.2 MG/DL (ref 8.3–10.6)
CHLORIDE SERPL-SCNC: 108 MMOL/L (ref 99–110)
CO2 SERPL-SCNC: 21 MMOL/L (ref 21–32)
CREAT SERPL-MCNC: 1.6 MG/DL (ref 0.8–1.3)
DEPRECATED RDW RBC AUTO: 13.9 % (ref 12.4–15.4)
EOSINOPHIL # BLD: 0.4 K/UL (ref 0–0.6)
EOSINOPHIL NFR BLD: 4.3 %
GFR SERPLBLD CREATININE-BSD FMLA CKD-EPI: 47 ML/MIN/{1.73_M2}
GLUCOSE BLD-MCNC: 131 MG/DL (ref 70–99)
GLUCOSE BLD-MCNC: 202 MG/DL (ref 70–99)
GLUCOSE SERPL-MCNC: 124 MG/DL (ref 70–99)
HCT VFR BLD AUTO: 32.3 % (ref 40.5–52.5)
HGB BLD-MCNC: 11 G/DL (ref 13.5–17.5)
LYMPHOCYTES # BLD: 1.5 K/UL (ref 1–5.1)
LYMPHOCYTES NFR BLD: 16.4 %
MCH RBC QN AUTO: 29.5 PG (ref 26–34)
MCHC RBC AUTO-ENTMCNC: 33.9 G/DL (ref 31–36)
MCV RBC AUTO: 86.9 FL (ref 80–100)
MONOCYTES # BLD: 0.8 K/UL (ref 0–1.3)
MONOCYTES NFR BLD: 8.6 %
NEUTROPHILS # BLD: 6.2 K/UL (ref 1.7–7.7)
NEUTROPHILS NFR BLD: 70.2 %
PERFORMED ON: ABNORMAL
PERFORMED ON: ABNORMAL
PLATELET # BLD AUTO: 196 K/UL (ref 135–450)
PMV BLD AUTO: 8.7 FL (ref 5–10.5)
POTASSIUM SERPL-SCNC: 4.7 MMOL/L (ref 3.5–5.1)
RBC # BLD AUTO: 3.72 M/UL (ref 4.2–5.9)
SODIUM SERPL-SCNC: 138 MMOL/L (ref 136–145)
WBC # BLD AUTO: 8.9 K/UL (ref 4–11)

## 2025-07-16 PROCEDURE — 80048 BASIC METABOLIC PNL TOTAL CA: CPT

## 2025-07-16 PROCEDURE — 6370000000 HC RX 637 (ALT 250 FOR IP): Performed by: INTERNAL MEDICINE

## 2025-07-16 PROCEDURE — 85025 COMPLETE CBC W/AUTO DIFF WBC: CPT

## 2025-07-16 PROCEDURE — 2500000003 HC RX 250 WO HCPCS: Performed by: INTERNAL MEDICINE

## 2025-07-16 PROCEDURE — 99233 SBSQ HOSP IP/OBS HIGH 50: CPT | Performed by: HOSPITALIST

## 2025-07-16 PROCEDURE — 36415 COLL VENOUS BLD VENIPUNCTURE: CPT

## 2025-07-16 PROCEDURE — 6370000000 HC RX 637 (ALT 250 FOR IP): Performed by: HOSPITALIST

## 2025-07-16 PROCEDURE — 2580000003 HC RX 258: Performed by: INTERNAL MEDICINE

## 2025-07-16 PROCEDURE — 6360000002 HC RX W HCPCS: Performed by: INTERNAL MEDICINE

## 2025-07-16 RX ORDER — AMOXICILLIN AND CLAVULANATE POTASSIUM 500; 125 MG/1; MG/1
1 TABLET, FILM COATED ORAL 2 TIMES DAILY
Qty: 20 TABLET | Refills: 0 | Status: SHIPPED | OUTPATIENT
Start: 2025-07-16 | End: 2025-07-26

## 2025-07-16 RX ADMIN — SODIUM CHLORIDE, PRESERVATIVE FREE 10 ML: 5 INJECTION INTRAVENOUS at 10:09

## 2025-07-16 RX ADMIN — FAMOTIDINE 20 MG: 20 TABLET, FILM COATED ORAL at 10:08

## 2025-07-16 RX ADMIN — ENOXAPARIN SODIUM 40 MG: 100 INJECTION SUBCUTANEOUS at 10:07

## 2025-07-16 RX ADMIN — GABAPENTIN 300 MG: 300 CAPSULE ORAL at 10:07

## 2025-07-16 RX ADMIN — SODIUM CHLORIDE, SODIUM LACTATE, POTASSIUM CHLORIDE, AND CALCIUM CHLORIDE: .6; .31; .03; .02 INJECTION, SOLUTION INTRAVENOUS at 10:17

## 2025-07-16 RX ADMIN — SUCRALFATE 1 G: 1 TABLET ORAL at 10:07

## 2025-07-16 RX ADMIN — SUCRALFATE 1 G: 1 TABLET ORAL at 05:37

## 2025-07-16 RX ADMIN — SODIUM CHLORIDE, SODIUM LACTATE, POTASSIUM CHLORIDE, AND CALCIUM CHLORIDE: .6; .31; .03; .02 INJECTION, SOLUTION INTRAVENOUS at 00:26

## 2025-07-16 RX ADMIN — ASPIRIN 81 MG: 81 TABLET, COATED ORAL at 10:07

## 2025-07-16 RX ADMIN — ACETAMINOPHEN 1000 MG: 500 TABLET ORAL at 05:37

## 2025-07-16 RX ADMIN — WATER 1000 MG: 1 INJECTION INTRAMUSCULAR; INTRAVENOUS; SUBCUTANEOUS at 10:08

## 2025-07-16 RX ADMIN — Medication 2000 UNITS: at 10:07

## 2025-07-16 NOTE — DISCHARGE INSTR - DIET

## 2025-07-16 NOTE — PROGRESS NOTES
CLINICAL PHARMACY NOTE: MEDS TO BEDS    Total # of Prescriptions Filled: 1   The following medications were delivered to the patient:  AMOXICILLIN-POT CLAVUL 500    Additional Documentation:  Delivered to patients room   Ok to be delivered per RN Radha Lofton CPhT

## 2025-07-16 NOTE — DISCHARGE SUMMARY
Hospital Medicine Discharge Summary    Patient: Jose Alejandro Mesa     Gender: male  : 1960   Age: 65 y.o.  MRN: [id number]    Admitting Physician: Tigre Tatum MD  Discharge Physician: Tigre Tatum MD     Code Status: Full Code     Admit Date: 2025   Discharge Date:   2025    Disposition:  Home  Time spent arranging discharge: 31 minutes    Discharge Diagnoses:    Active Hospital Problems    Diagnosis Date Noted    Epididymoorchitis [N45.3] 2025    NANETTE (acute kidney injury) [N17.9] 2025    CKD stage 3a, GFR 45-59 ml/min ([facility]) [N18.31] 2025    Sepsis ([facility]) [A41.9] 2025         Condition at Discharge:  Stable    Hospital Course:   Sepsis secondary to right epididymo-orchitis.   Ecoli uti  Start on iv atbx improved cleared for dc by urolgoy fu with urology as outpatient  Discharged on course of oral atbx    NANETTE on ckd3 improved with ivf    Discharge Exam:    /81   Pulse 66   Temp 97.6 °F (36.4 °C) (Oral)   Resp 16   Ht 1.524 m (5')   Wt 76.5 kg (168 lb 9 oz)   SpO2 97%   BMI 32.92 kg/m²   General appearance:  Appears comfortable. AAOx3  HEENT: atraumatic, Pupils equal, muscous membranes moist, no masses appreciated  Cardiovascular: Regular rate and rhythm no murmurs appreciated  Respiratory: CTAB no wheezing  Gastrointestinal: Abdomen soft, non-tender, BS+  : right testicle tenderness to palpation improving  EXT: no edema  Neurology: no gross focal deficts  Psychiatry: Appropriate affect. Not agitated  Skin: Warm, dry, no rashes appreciated    Discharge Medications:   Current Discharge Medication List        START taking these medications    Details   amoxicillin-clavulanate (AUGMENTIN) 500-125 MG per tablet Take 1 tablet by mouth in the morning and at bedtime for 10 days  Qty: 20 tablet, Refills: 0           Current Discharge Medication List        Current Discharge Medication List        CONTINUE these medications which have NOT CHANGED

## 2025-07-16 NOTE — PROGRESS NOTES
Nephrology Note                                                                                                                                                                                                                                                                                                                                                               Office : [phone]     Fax :[phone]    Patient's Name: Jose Alejandro Mesa  7/16/2025    Reason for Consult:  NANETTE  Requesting Physician:  Chris Pena MD  Chief Complaint:    Chief Complaint   Patient presents with    Groin Pain     Pt arrives from home by his daughter. Pt C/O R testicle pain for a month. Pt states that he has been having fever yesterday. Pt rates his pain a 10 out of 10. Pt states that he has been having more swelling.        Assessment/Plan     NANETTE   From sepsis   Creatinine at baseline 1.5 -1.6  On admission 2.0   Continue antibiotics and IV fluids   Renal ultrasound without issues   We will monitor creatinine   Right epididymo-orchitis  Urology on board   Gonorrhea and Chlamydia pending   Sepsis  Blood cultures   Antibiotics   CKD 3a  No proteinuria     History of Present Ilness:    Jose Alejandro Mesa is a 65 y.o. male with 1 month of worsening right lower testicular pain that is sharp in nature warm to touch. States had a fever with chills and sweats no nausea vomiting no sick contacts states had a similar episode many years ago resolved with antibiotics. Smoke cigarettes daily no alcohol use, history of diabetes on oral pills.     Interval hx:    Creatinine overall stable  Electrolytes stable  BP's controlled  Great UOP  Anticipating discharge today       Past Surgical History:   Procedure Laterality Date    CHOLECYSTECTOMY, LAPAROSCOPIC N/A 7/16/2024    ROBOTIC LAPAROSCOPIC CHOLECYSTECTOMY WITH CHOLANGIOGRAM performed by Zhou Meadows MD at [facility] OR    FINGER TRIGGER RELEASE Right 3/3/2022    RIGHT LONG FINGER TRIGGER

## 2025-07-16 NOTE — PROGRESS NOTES
Urology Progress Note  [facility]     Patient: Jose Alejandro Mesa MRN: [id number]  Room/Bed: 5TN-5569/5569-01   YOB: 1960  Age/Sex: 65 y.o.male  Admission Date: 7/13/2025     Date of Service:  7/16/2025    ASSESSMENT/PLAN     64 yo male admitted with right orchalgia  Exam with right epididymo-orchitis, confirmed by BREANA  CT a/p negative from gu standpoint     Cr is 1.9  WBC 13.9 from 14k  =====  Cr 1.6 from 2.0  WBC 8.9 from 9.6 from 13.9k  Exam improved. Pain improved  Afebrile      Recommendations:  Cotninue abx and fu cultures  Flomax  Anticipate discharge on PO abx with return of sensitivities. Patient is stable today from  standpoint. He is cleared for discharge from our end. Will need follow up here in about two weeks. Will sign out call with questions.     All patient questions were answered. He understands the plan as listed above.    SUBJECTIVE     Chief Complaint:   Chief Complaint   Patient presents with    Groin Pain     Pt arrives from home by his daughter. Pt C/O R testicle pain for a month. Pt states that he has been having fever yesterday. Pt rates his pain a 10 out of 10. Pt states that he has been having more swelling.        24 Hour Events: Today patient reports see hpi.  Otherwise symptoms are overall improved and pain is adequately controlled.  Denies nausea/vomiting.  No other genitourinary symptoms.    OBJECTIVE     Hospital Problem List:  Principal Problem:    Sepsis (HCC)  Active Problems:    Epididymoorchitis    NANETTE (acute kidney injury)    CKD stage 3a, GFR 45-59 ml/min ([facility])  Resolved Problems:    * No resolved hospital problems. *      Physical Exam:  Vitals:    07/16/25 1018   BP:    Pulse: 69   Resp:    Temp:    SpO2:      CONSTITUTIONAL: The patient is well nourished/developed, with no distress noted.   NEUROLOGICAL/PSYCHIATRIC: Oriented to place and time, normal affected noted.   CARDIOVASCULAR: Regular rate and rhythm, no evidence of swelling noted.

## 2025-07-16 NOTE — PROGRESS NOTES
Pt discharged to home today. Vitals were stable and pt was free from pain. Discharge instructions and medications reviewed with pt. Pt to follow-up with PCP in one week. Pt taken via wheelchair to family car for transport.

## 2025-07-17 ENCOUNTER — CARE COORDINATION (OUTPATIENT)
Dept: CASE MANAGEMENT | Age: 65
End: 2025-07-17

## 2025-07-17 ENCOUNTER — TELEPHONE (OUTPATIENT)
Dept: PRIMARY CARE CLINIC | Age: 65
End: 2025-07-17

## 2025-07-17 DIAGNOSIS — N45.3 EPIDIDYMOORCHITIS: Primary | ICD-10-CM

## 2025-07-17 DIAGNOSIS — A41.9 SEPSIS, DUE TO UNSPECIFIED ORGANISM, UNSPECIFIED WHETHER ACUTE ORGAN DYSFUNCTION PRESENT (HCC): ICD-10-CM

## 2025-07-17 LAB
BACTERIA BLD CULT ORG #2: NORMAL
BACTERIA BLD CULT: NORMAL

## 2025-07-17 NOTE — CARE COORDINATION
Care Transitions Note    Initial Call - Call within 2 business days of discharge: Yes    Patient Current Location:  Home: [address] [place] [address]    Care Transition Nurse contacted the family, daughter, (PHI form verified; on file) by telephone to perform post hospital discharge assessment, verified name and  as identifiers.  Provided introduction to self, and explanation of the Care Transition Nurse role.    Patient: Jose Alejandro Mesa    Patient : 1960   MRN: [id number]    Reason for Admission:   Discharge Date: 25  RURS: Readmission Risk Score: 10.8      Last Discharge Facility       Date Complaint Diagnosis Description Type Department Provider    25 Groin Pain Epididymoorchitis ... ED to Hosp-Admission (Discharged) (ADMITTED) [facility]T Tigre Tatum MD            Was this an external facility discharge? No    Additional needs identified to be addressed with provider   No needs identified             Method of communication with provider: none.    Patients top risk factors for readmission: medical condition-sepsis    Interventions to address risk factors:   Review of patient management of conditions/medications: sepsis    Care Summary Note: Pt's daughter states pt is doing well, no issues or concerns. Taking meds as directed. Tried to assist in making follow up appt with PCP but could not agree upon a date,she will call office. This nurse will reach out to office as well. Agreed to more CTC f/u calls      Care Transition Nurse reviewed discharge instructions with family. The family was given an opportunity to ask questions; all questions answered at this time.. The family verbalized understanding.   Were discharge instructions available to patient? Yes.   Reviewed appropriate site of care based on symptoms and resources available to patient including: PCP  When to call 911. The family agrees to contact the primary care provider and/or specialist office for questions

## 2025-07-18 LAB
REASON FOR REJECTION: NORMAL
REJECTED TEST: NORMAL

## 2025-07-18 NOTE — PROGRESS NOTES
Physician Progress Note      PATIENT:               PEDRO STEVENS  CSN #:                  [id number]  :                       1960  ADMIT DATE:       2025 12:08 PM  DISCH DATE:        2025 12:12 PM  RESPONDING  PROVIDER #:        Tigre Tatum MD          QUERY TEXT:    Dear Dr. Tatum,  Sepsis and acute organ dysfunction of, NANETTE, are documented in the medical   record. Please clarify the relationship, if any, between Sepsis and NANETTE.    The clinical indicators include:  66 yo m presents with epididymoorchitis  -Creatinine (mg/dL) 1.6H 2.0H 1.9H baseline 1.5  -noted documentation per H/P dated  of, 'Sepsis secondary to right   epididymo-orchitis, Nanette on cdk3'  -noted documentation per Discharge Summary dated  of, 'Sepsis secondary to   right epididymo-orchitis, Ecoli uti, NANETTE on ckd3 improved with ivf'  - Neph consults, ABX IVF renal US, check urine cx, bld cx CBC, CMP    Thank you, in advance,  Wanda Soto RN BSN CRCR  [email or web address]   [email or web address]  Options provided:  -- Acute organ dysfunction is related to sepsis  -- Acute organ dysfunction is unrelated to sepsis  -- Other - I will add my own diagnosis  -- Disagree - Not applicable / Not valid  -- Disagree - Clinically unable to determine / Unknown  -- Refer to Clinical Documentation Reviewer    PROVIDER RESPONSE TEXT:    This patient has acute organ dysfunction related to sepsis.    Query created by: Wanda Soto on 2025 11:30 AM      Electronically signed by:  Tigre Tatum MD 2025 5:40 PM

## 2025-07-21 ENCOUNTER — CARE COORDINATION (OUTPATIENT)
Dept: CASE MANAGEMENT | Age: 65
End: 2025-07-21

## 2025-07-21 NOTE — CARE COORDINATION
Care Transitions Note    Follow Up Call     Patient Current Location:  Home: [address] [place] [address]    Care Transition Nurse contacted the patient by telephone. Verified name and  as identifiers.    Additional needs identified to be addressed with provider   No needs identified                 Method of communication with provider: none.    Care Summary Note: Spoke with patient's daughter.  States that patient is still having pain in testicle. States taking tylenol 1,00mg twice a day. States not sure if patient is urinating well. States patient is eating but not his normal amounts.  States patient continues with antibiotics.  Discussed PCP appt not until , instructed daughter to see call for earlier appt if symptoms still continue in the next few days with no relief, and if not urinating well, to seek immediate care. Daughter verbalizes good knowledge.    Plan of care updates since last contact:  Review of patient management of conditions/medications:         Advance Care Planning:   Does patient have an Advance Directive: deferred at this time, will discuss on future follow up. .    Medication Review:  No changes since last call.     Remote Patient Monitoring:  Offered patient enrollment in the Remote Patient Monitoring (RPM) program for in-home monitoring: Yes, but did not enroll at this time: limited patient ability to navigate RPM/equipment.    Assessments:  Care Transitions 24 Hour Call    Do you have all of your prescriptions and are they filled?: Yes  Do you have support at home?: Partner/Spouse/SO, Child  Are you an active caregiver in your home?: No  Care Transitions Interventions          Follow Up Appointment:   Reviewed upcoming appointment(s).  Future Appointments         Provider Specialty Dept Phone    2025 3:00 PM Toribio Vyas MD Nephrology [phone]    2025 1:00 PM Chris Pena MD Primary Care [phone]    2025 12:30 PM Toribio Vyas MD

## 2025-07-24 ENCOUNTER — CARE COORDINATION (OUTPATIENT)
Dept: CASE MANAGEMENT | Age: 65
End: 2025-07-24

## 2025-07-24 NOTE — CARE COORDINATION
Care Transitions Note    Follow Up Call     Patient Current Location:  Home:  [address] [place] [address]    LPN Care Coordinator contacted the patient by telephone. Verified name and  as identifiers.    Additional needs identified to be addressed with provider   No needs identified                 Method of communication with provider: none.    Care Summary Note: LPN CC spoke with patient's daughter via [employer],  ID# [id number],  States patient is doing better. Still having some pain. Still taking Tylenol 1,000 mg twice daily. Appetite good. Denies problems with bowels or bladder. Patient taking antibiotics still. Denies problems with ADLs. Had to reschedule for next month. Patient seeing PCP in a couple of weeks. Denies needs.     Plan of care updates since last contact:  Review of patient management of conditions/medications:         Advance Care Planning:   Does patient have an Advance Directive: deferred at this time, will discuss on future follow up. .    Medication Review:  No changes since last call.     Remote Patient Monitoring:  Offered patient enrollment in the Remote Patient Monitoring (RPM) program for in-home monitoring: Yes, but did not enroll at this time: limited patient ability to navigate RPM/equipment.    Assessments:  Care Transitions Subsequent and Final Call    Subsequent and Final Calls  Care Transitions Interventions  Other Interventions:              Follow Up Appointment:   Reviewed upcoming appointment(s).  Future Appointments         Provider Specialty Dept Phone    2025 1:00 PM Chris Pena MD Primary Care [phone]    2025 12:30 PM Toribio Vyas MD Nephrology [phone]    10/23/2025 1:00 PM Samir Abad MD Pulmonology [phone]            [facility] Care Coordinator provided contact information.  Plan for follow-up call in 6-10 days based on severity of symptoms and risk factors.  Plan for next call: symptom

## 2025-07-24 NOTE — CONSULTS
Session ID: [id number]  Session Duration: 10 minutes  Language: Greenlandic   ID: #[id number]   Name: Ovidio

## 2025-07-31 ENCOUNTER — CARE COORDINATION (OUTPATIENT)
Dept: CASE MANAGEMENT | Age: 65
End: 2025-07-31

## 2025-07-31 NOTE — CARE COORDINATION
Care Transitions Note    Follow Up Call     Patient Current Location:  Home: [address] [place] [address]    Care Transition Nurse contacted the patient by telephone. Verified name and  as identifiers.    Additional needs identified to be addressed with provider   No needs identified                 Method of communication with provider: none.    Care Summary Note: Spoke with daughterDudley states patient is doing better.  States he complains of pain a lot less and taking tylenol for pain relief.  States appetite is good and no complications with b/b. States has fu with PCP on 25.    Plan of care updates since last contact:  Review of patient management of conditions/medications:         Advance Care Planning:   Does patient have an Advance Directive: patient declined education.    Medication Review:  No changes since last call.     Remote Patient Monitoring:  Offered patient enrollment in the Remote Patient Monitoring (RPM) program for in-home monitoring: Yes, but did not enroll at this time: limited patient ability to navigate RPM/equipment.    Assessments:  No changes since last call    Follow Up Appointment:   Reviewed upcoming appointment(s).  Future Appointments         Provider Specialty Dept Phone    2025 1:00 PM Chris Pena MD Primary Care [phone]    2025 12:30 PM Toribio Vyas MD Nephrology [phone]    10/23/2025 1:00 PM Samir Abad MD Pulmonology [phone]            Care Transition Nurse provided contact information.  Plan for follow-up call in 6-10 days based on severity of symptoms and risk factors.  Plan for next call: symptom management-   self management-  FU with pain and urinary.    Dilma Montaño RN

## 2025-08-07 ENCOUNTER — CARE COORDINATION (OUTPATIENT)
Dept: CASE MANAGEMENT | Age: 65
End: 2025-08-07

## 2025-08-11 ENCOUNTER — CARE COORDINATION (OUTPATIENT)
Dept: CASE MANAGEMENT | Age: 65
End: 2025-08-11

## 2025-08-14 ENCOUNTER — OFFICE VISIT (OUTPATIENT)
Dept: PRIMARY CARE CLINIC | Age: 65
End: 2025-08-14
Payer: MEDICAID

## 2025-08-14 VITALS
BODY MASS INDEX: 32.39 KG/M2 | OXYGEN SATURATION: 96 % | WEIGHT: 165 LBS | TEMPERATURE: 98.3 F | SYSTOLIC BLOOD PRESSURE: 106 MMHG | DIASTOLIC BLOOD PRESSURE: 70 MMHG | HEART RATE: 80 BPM | HEIGHT: 60 IN

## 2025-08-14 DIAGNOSIS — N45.3 EPIDIDYMOORCHITIS: ICD-10-CM

## 2025-08-14 DIAGNOSIS — E11.9 ENCOUNTER FOR DIABETIC FOOT EXAM (HCC): ICD-10-CM

## 2025-08-14 DIAGNOSIS — N18.32 CKD STAGE 3B, GFR 30-44 ML/MIN (HCC): ICD-10-CM

## 2025-08-14 DIAGNOSIS — E11.9 TYPE 2 DIABETES MELLITUS WITHOUT COMPLICATION, WITHOUT LONG-TERM CURRENT USE OF INSULIN (HCC): ICD-10-CM

## 2025-08-14 DIAGNOSIS — I10 PRIMARY HYPERTENSION: ICD-10-CM

## 2025-08-14 DIAGNOSIS — Z09 HOSPITAL DISCHARGE FOLLOW-UP: Primary | ICD-10-CM

## 2025-08-14 DIAGNOSIS — E78.49 OTHER HYPERLIPIDEMIA: ICD-10-CM

## 2025-08-14 PROCEDURE — 3078F DIAST BP <80 MM HG: CPT | Performed by: INTERNAL MEDICINE

## 2025-08-14 PROCEDURE — 3051F HG A1C>EQUAL 7.0%<8.0%: CPT | Performed by: INTERNAL MEDICINE

## 2025-08-14 PROCEDURE — 99214 OFFICE O/P EST MOD 30 MIN: CPT | Performed by: INTERNAL MEDICINE

## 2025-08-14 PROCEDURE — 1111F DSCHRG MED/CURRENT MED MERGE: CPT | Performed by: INTERNAL MEDICINE

## 2025-08-14 PROCEDURE — 1123F ACP DISCUSS/DSCN MKR DOCD: CPT | Performed by: INTERNAL MEDICINE

## 2025-08-14 PROCEDURE — 3074F SYST BP LT 130 MM HG: CPT | Performed by: INTERNAL MEDICINE

## 2025-08-14 ASSESSMENT — ENCOUNTER SYMPTOMS
ABDOMINAL DISTENTION: 0
CHEST TIGHTNESS: 0
VOMITING: 0
BLOOD IN STOOL: 0
TROUBLE SWALLOWING: 0
EYE PAIN: 0
SORE THROAT: 0
ABDOMINAL PAIN: 0
SINUS PRESSURE: 0
CONSTIPATION: 0
EYE REDNESS: 0
DIARRHEA: 0
NAUSEA: 0
COLOR CHANGE: 0
BACK PAIN: 0
COUGH: 0
WHEEZING: 0
SHORTNESS OF BREATH: 0

## (undated) DEVICE — C-ARM: Brand: UNBRANDED

## (undated) DEVICE — DRAPE HND W114XL142IN BLU POLYPR W O PCH FEN CRD AND TB HLDR

## (undated) DEVICE — SUTURE PDS + SZ 0 L36IN ABSRB VLT CT 1 L36MM 1 2 CIR PDP346H

## (undated) DEVICE — SYRINGE, LUER LOCK, 10ML: Brand: MEDLINE

## (undated) DEVICE — GLOVE ORTHO 8   MSG9480

## (undated) DEVICE — SUTURE MONOCRYL + SZ 4-0 L27IN ABSRB UD L19MM PS-2 3/8 CIR MCP426H

## (undated) DEVICE — BAG RETRIEVAL SPECIMEN SUPERBAG 5 SMALL NYLON ITRODUCER

## (undated) DEVICE — BANDAGE GZ W2XL75IN ST RAYON POLY CNFRM STRTCH LTWT

## (undated) DEVICE — ELECTRODE PT RET AD L9FT HI MOIST COND ADH HYDRGEL CORDED

## (undated) DEVICE — CATHETER CHOLANGIOGRAM 4.5 FRX3 IN W/ 20018M55 TAUT INTRO

## (undated) DEVICE — TOWEL,OR,DSP,ST,BLUE,STD,4/PK,20PK/CS: Brand: MEDLINE

## (undated) DEVICE — DRESSING,GAUZE,XEROFORM,CURAD,1"X8",ST: Brand: CURAD

## (undated) DEVICE — BLANKET WRM W29.9XL79.1IN UP BODY FORC AIR MISTRAL-AIR

## (undated) DEVICE — APPLICATOR MEDICATED 26 CC SOLUTION HI LT ORNG CHLORAPREP

## (undated) DEVICE — INTENDED FOR TISSUE SEPARATION, AND OTHER PROCEDURES THAT REQUIRE A SHARP SURGICAL BLADE TO PUNCTURE OR CUT.: Brand: BARD-PARKER ® STAINLESS STEEL BLADES

## (undated) DEVICE — CORD,CAUTERY,BIPOLAR,STERILE: Brand: MEDLINE

## (undated) DEVICE — SOLUTION IRRIG 500ML 0.9% SOD CHL USP POUR PLAS BTL

## (undated) DEVICE — HYPODERMIC SAFETY NEEDLE: Brand: MAGELLAN

## (undated) DEVICE — AIRSEAL 8 MM CANNULA CAP AND OBTURATOR WITH BLADELESS OPTICAL TIP COMPATIBLE WITH INTUITIVE DA VINCI XI AND DA VINCI X 8 MM INSTRUMENT CANNULA, STANDARD LENGTH: Brand: AIRSEAL

## (undated) DEVICE — ARM DRAPE

## (undated) DEVICE — COVER,TABLE,77X90,STERILE: Brand: MEDLINE

## (undated) DEVICE — LIQUIBAND RAPID ADHESIVE 36/CS 0.8ML: Brand: MEDLINE

## (undated) DEVICE — PADDING CAST W4INXL4YD ST COT RAYON MICROPLEATED HIGHLY

## (undated) DEVICE — BANDAGE COMPR W4INXL12FT E DISP ESMARCH EVEN

## (undated) DEVICE — AIRSEAL BIFURCATED FILTERED TUBESET WITH ACTIVATED CHARCOAL FILTER: Brand: AIRSEAL

## (undated) DEVICE — 30978 SEE SHARP - ENHANCED INTRAOPERATIVE LAPAROSCOPE CLEANING & DEFOGGING: Brand: 30978 SEE SHARP - ENHANCED INTRAOPERATIVE LAPAROSCOPE CLEANING & DEFOGGING

## (undated) DEVICE — SET EXTN PRIMING 4.9ML L30IN INCL SLDE CLMP SPIN M LUERLOCK

## (undated) DEVICE — Device: Brand: MEDEX

## (undated) DEVICE — GLOVE SURG SZ 65 THK91MIL LTX FREE SYN POLYISOPRENE

## (undated) DEVICE — PAD PT POS 36 IN SURGYPAD DISP

## (undated) DEVICE — GLOVE SURG SZ 6 THK91MIL LTX FREE SYN POLYISOPRENE ANTI

## (undated) DEVICE — NEEDLE HYPO 22GA L1.5IN BLK POLYPR HUB S STL REG BVL STR

## (undated) DEVICE — SYRINGE MED 30ML STD CLR PLAS LUERLOCK TIP N CTRL DISP

## (undated) DEVICE — MASC TURNOVER KIT: Brand: MEDLINE INDUSTRIES, INC.

## (undated) DEVICE — PMI DISPOSABLE PUNCTURE CLOSURE DEVICE / SUTURE GRASPER: Brand: PMI

## (undated) DEVICE — CATHETER CHOLGM 4.5FR L18IN W/ MTL SUPP TB

## (undated) DEVICE — GLOVE SURG SZ 65 L12IN FNGR THK79MIL GRN LTX FREE

## (undated) DEVICE — SEAL

## (undated) DEVICE — BNDG,ELSTC,MATRIX,STRL,4"X5YD,LF,HOOK&LP: Brand: MEDLINE

## (undated) DEVICE — GENERAL ROBOT: Brand: MEDLINE INDUSTRIES, INC.

## (undated) DEVICE — MEDICINE CUP, GRADUATED, STER: Brand: MEDLINE

## (undated) DEVICE — GOWN,AURORA,NONREINF,RAGLAN,XXL,STERILE: Brand: MEDLINE

## (undated) DEVICE — GAUZE,SPONGE,4"X4",8PLY,STRL,LF,10/TRAY: Brand: MEDLINE

## (undated) DEVICE — BLADELESS OBTURATOR: Brand: WECK VISTA

## (undated) DEVICE — COVER LT HNDL BLU PLAS

## (undated) DEVICE — PACK PROCEDURE SURG EXTREMITY MFFOP CUST

## (undated) DEVICE — INSUFFLATION NEEDLE TO ESTABLISH PNEUMOPERITONEUM.: Brand: INSUFFLATION NEEDLE